# Patient Record
Sex: FEMALE | Race: WHITE | Employment: FULL TIME | URBAN - METROPOLITAN AREA
[De-identification: names, ages, dates, MRNs, and addresses within clinical notes are randomized per-mention and may not be internally consistent; named-entity substitution may affect disease eponyms.]

---

## 2018-12-23 ENCOUNTER — HOSPITAL ENCOUNTER (EMERGENCY)
Age: 63
Discharge: HOME | End: 2018-12-23
Attending: EMERGENCY MEDICINE
Payer: COMMERCIAL

## 2018-12-23 ENCOUNTER — APPOINTMENT (OUTPATIENT)
Dept: GENERAL RADIOLOGY | Age: 63
End: 2018-12-23
Payer: COMMERCIAL

## 2018-12-23 VITALS
TEMPERATURE: 102.1 F | RESPIRATION RATE: 18 BRPM | WEIGHT: 168 LBS | OXYGEN SATURATION: 91 % | HEIGHT: 66 IN | BODY MASS INDEX: 27 KG/M2 | HEART RATE: 87 BPM | DIASTOLIC BLOOD PRESSURE: 54 MMHG | SYSTOLIC BLOOD PRESSURE: 106 MMHG

## 2018-12-23 DIAGNOSIS — J18.9 PNEUMONIA DUE TO ORGANISM: ICD-10-CM

## 2018-12-23 DIAGNOSIS — J10.1 INFLUENZA A: Primary | ICD-10-CM

## 2018-12-23 LAB
ANION GAP SERPL CALCULATED.3IONS-SCNC: 14 MMOL/L (ref 7–16)
BASOPHILS ABSOLUTE: 0.05 E9/L (ref 0–0.2)
BASOPHILS RELATIVE PERCENT: 0.6 % (ref 0–2)
BUN BLDV-MCNC: 9 MG/DL (ref 8–23)
CALCIUM SERPL-MCNC: 8.5 MG/DL (ref 8.6–10.2)
CHLORIDE BLD-SCNC: 95 MMOL/L (ref 98–107)
CO2: 24 MMOL/L (ref 22–29)
CREAT SERPL-MCNC: 0.7 MG/DL (ref 0.5–1)
EOSINOPHILS ABSOLUTE: 0 E9/L (ref 0.05–0.5)
EOSINOPHILS RELATIVE PERCENT: 0 % (ref 0–6)
GFR AFRICAN AMERICAN: >60
GFR NON-AFRICAN AMERICAN: >60 ML/MIN/1.73
GLUCOSE BLD-MCNC: 110 MG/DL (ref 74–99)
HCT VFR BLD CALC: 39 % (ref 34–48)
HEMOGLOBIN: 13.1 G/DL (ref 11.5–15.5)
IMMATURE GRANULOCYTES #: 0.03 E9/L
IMMATURE GRANULOCYTES %: 0.4 % (ref 0–5)
INFLUENZA A BY PCR: DETECTED
INFLUENZA B BY PCR: NOT DETECTED
LYMPHOCYTES ABSOLUTE: 1.06 E9/L (ref 1.5–4)
LYMPHOCYTES RELATIVE PERCENT: 12.8 % (ref 20–42)
MCH RBC QN AUTO: 33.2 PG (ref 26–35)
MCHC RBC AUTO-ENTMCNC: 33.6 % (ref 32–34.5)
MCV RBC AUTO: 98.7 FL (ref 80–99.9)
MONOCYTES ABSOLUTE: 1.21 E9/L (ref 0.1–0.95)
MONOCYTES RELATIVE PERCENT: 14.7 % (ref 2–12)
NEUTROPHILS ABSOLUTE: 5.9 E9/L (ref 1.8–7.3)
NEUTROPHILS RELATIVE PERCENT: 71.5 % (ref 43–80)
PDW BLD-RTO: 13.1 FL (ref 11.5–15)
PLATELET # BLD: 228 E9/L (ref 130–450)
PMV BLD AUTO: 9.6 FL (ref 7–12)
POTASSIUM SERPL-SCNC: 4 MMOL/L (ref 3.5–5)
RBC # BLD: 3.95 E12/L (ref 3.5–5.5)
SODIUM BLD-SCNC: 133 MMOL/L (ref 132–146)
WBC # BLD: 8.3 E9/L (ref 4.5–11.5)

## 2018-12-23 PROCEDURE — 80048 BASIC METABOLIC PNL TOTAL CA: CPT

## 2018-12-23 PROCEDURE — 99284 EMERGENCY DEPT VISIT MOD MDM: CPT

## 2018-12-23 PROCEDURE — 6370000000 HC RX 637 (ALT 250 FOR IP): Performed by: EMERGENCY MEDICINE

## 2018-12-23 PROCEDURE — 71046 X-RAY EXAM CHEST 2 VIEWS: CPT

## 2018-12-23 PROCEDURE — 36415 COLL VENOUS BLD VENIPUNCTURE: CPT

## 2018-12-23 PROCEDURE — 6370000000 HC RX 637 (ALT 250 FOR IP): Performed by: STUDENT IN AN ORGANIZED HEALTH CARE EDUCATION/TRAINING PROGRAM

## 2018-12-23 PROCEDURE — 87502 INFLUENZA DNA AMP PROBE: CPT

## 2018-12-23 PROCEDURE — 94640 AIRWAY INHALATION TREATMENT: CPT

## 2018-12-23 PROCEDURE — 85025 COMPLETE CBC W/AUTO DIFF WBC: CPT

## 2018-12-23 RX ORDER — PREDNISONE 20 MG/1
60 TABLET ORAL ONCE
Status: COMPLETED | OUTPATIENT
Start: 2018-12-23 | End: 2018-12-23

## 2018-12-23 RX ORDER — OSELTAMIVIR PHOSPHATE 75 MG/1
75 CAPSULE ORAL 2 TIMES DAILY
Qty: 20 CAPSULE | Refills: 0 | Status: SHIPPED | OUTPATIENT
Start: 2018-12-23 | End: 2019-01-02

## 2018-12-23 RX ORDER — IPRATROPIUM BROMIDE AND ALBUTEROL SULFATE 2.5; .5 MG/3ML; MG/3ML
1 SOLUTION RESPIRATORY (INHALATION) ONCE
Status: COMPLETED | OUTPATIENT
Start: 2018-12-23 | End: 2018-12-23

## 2018-12-23 RX ORDER — ALBUTEROL SULFATE 90 UG/1
2 AEROSOL, METERED RESPIRATORY (INHALATION) EVERY 6 HOURS PRN
Qty: 1 INHALER | Refills: 3 | Status: SHIPPED | OUTPATIENT
Start: 2018-12-23

## 2018-12-23 RX ORDER — OSELTAMIVIR PHOSPHATE 75 MG/1
75 CAPSULE ORAL 2 TIMES DAILY
Status: DISCONTINUED | OUTPATIENT
Start: 2018-12-23 | End: 2018-12-23

## 2018-12-23 RX ORDER — ACETAMINOPHEN 325 MG/1
650 TABLET ORAL ONCE
Status: COMPLETED | OUTPATIENT
Start: 2018-12-23 | End: 2018-12-23

## 2018-12-23 RX ORDER — PREDNISONE 20 MG/1
TABLET ORAL
Qty: 10 TABLET | Refills: 0 | Status: SHIPPED | OUTPATIENT
Start: 2018-12-23 | End: 2019-01-02

## 2018-12-23 RX ORDER — DOXYCYCLINE HYCLATE 100 MG
100 TABLET ORAL 2 TIMES DAILY
Qty: 20 TABLET | Refills: 0 | Status: SHIPPED | OUTPATIENT
Start: 2018-12-23 | End: 2019-01-02

## 2018-12-23 RX ADMIN — PREDNISONE 60 MG: 20 TABLET ORAL at 16:25

## 2018-12-23 RX ADMIN — IPRATROPIUM BROMIDE AND ALBUTEROL SULFATE 1 AMPULE: .5; 3 SOLUTION RESPIRATORY (INHALATION) at 16:34

## 2018-12-23 RX ADMIN — ACETAMINOPHEN 650 MG: 325 TABLET ORAL at 17:36

## 2018-12-23 ASSESSMENT — ENCOUNTER SYMPTOMS
VOMITING: 0
TROUBLE SWALLOWING: 0
WHEEZING: 1
COUGH: 1
COLOR CHANGE: 0
SORE THROAT: 1
RHINORRHEA: 1
SHORTNESS OF BREATH: 0
BACK PAIN: 0
NAUSEA: 0
VOICE CHANGE: 0
ABDOMINAL PAIN: 0
STRIDOR: 0
DIARRHEA: 0
BLOOD IN STOOL: 0

## 2018-12-23 ASSESSMENT — PAIN SCALES - GENERAL: PAINLEVEL_OUTOF10: 0

## 2018-12-23 NOTE — ED NOTES
Walked patient on pulse Ox patient stated at 91% RA, denies SOB, HR in 80s.       Nava Quintero, RN  12/23/18 2294

## 2018-12-23 NOTE — ED PROVIDER NOTES
influenza. Workup in the ED revealed influenza A positivity and a right lower lobe infiltrate on chest x-ray. Patient was given DuoNeb treatment and Tylenol for their symptoms with moderate improvement. Patient was advised to stop smoking. Patient continues to be non-toxic on re-evaluation. Findings were discussed with the patient and reasons to immediately return to the ED were articulated to them. They will follow-up with their PMD.              --------------------------------------------- PAST HISTORY ---------------------------------------------  Past Medical History:  has a past medical history of Hypertension. Past Surgical History:  has no past surgical history on file. Social History:  reports that she has been smoking. She has been smoking about 0.50 packs per day. She has never used smokeless tobacco. She reports that she drinks alcohol. She reports that she does not use drugs. Family History: family history is not on file. The patients home medications have been reviewed.     Allergies: Pcn [penicillins]    -------------------------------------------------- RESULTS -------------------------------------------------  Labs:  Results for orders placed or performed during the hospital encounter of 12/23/18   Rapid influenza A/B antigens   Result Value Ref Range    Influenza A by PCR DETECTED (A) Not Detected    Influenza B by PCR Not Detected Not Detected   CBC Auto Differential   Result Value Ref Range    WBC 8.3 4.5 - 11.5 E9/L    RBC 3.95 3.50 - 5.50 E12/L    Hemoglobin 13.1 11.5 - 15.5 g/dL    Hematocrit 39.0 34.0 - 48.0 %    MCV 98.7 80.0 - 99.9 fL    MCH 33.2 26.0 - 35.0 pg    MCHC 33.6 32.0 - 34.5 %    RDW 13.1 11.5 - 15.0 fL    Platelets 413 009 - 489 E9/L    MPV 9.6 7.0 - 12.0 fL    Neutrophils % 71.5 43.0 - 80.0 %    Immature Granulocytes % 0.4 0.0 - 5.0 %    Lymphocytes % 12.8 (L) 20.0 - 42.0 %    Monocytes % 14.7 (H) 2.0 - 12.0 %    Eosinophils % 0.0 0.0 - 6.0 %    Basophils % 0.6

## 2021-03-24 ENCOUNTER — IMMUNIZATION (OUTPATIENT)
Dept: PRIMARY CARE CLINIC | Age: 66
End: 2021-03-24
Payer: COMMERCIAL

## 2021-03-24 PROCEDURE — 91300 COVID-19, PFIZER VACCINE 30MCG/0.3ML DOSE: CPT | Performed by: INTERNAL MEDICINE

## 2021-03-24 PROCEDURE — 0001A COVID-19, PFIZER VACCINE 30MCG/0.3ML DOSE: CPT | Performed by: INTERNAL MEDICINE

## 2021-04-15 ENCOUNTER — IMMUNIZATION (OUTPATIENT)
Dept: PRIMARY CARE CLINIC | Age: 66
End: 2021-04-15
Payer: COMMERCIAL

## 2021-04-15 PROCEDURE — 0002A COVID-19, PFIZER VACCINE 30MCG/0.3ML DOSE: CPT | Performed by: INTERNAL MEDICINE

## 2021-04-15 PROCEDURE — 91300 COVID-19, PFIZER VACCINE 30MCG/0.3ML DOSE: CPT | Performed by: INTERNAL MEDICINE

## 2022-10-21 ENCOUNTER — HOSPITAL ENCOUNTER (OUTPATIENT)
Dept: ULTRASOUND IMAGING | Age: 67
Discharge: HOME | End: 2022-10-23
Payer: MEDICARE

## 2022-10-21 DIAGNOSIS — E04.1 THYROID NODULE: ICD-10-CM

## 2022-10-21 PROCEDURE — 10005 FNA BX W/US GDN 1ST LES: CPT

## 2022-10-21 PROCEDURE — 88173 CYTOPATH EVAL FNA REPORT: CPT

## 2022-10-21 PROCEDURE — 88305 TISSUE EXAM BY PATHOLOGIST: CPT

## 2022-10-21 PROCEDURE — 10005 FNA BX W/US GDN 1ST LES: CPT | Performed by: RADIOLOGY

## 2022-10-21 NOTE — BRIEF OP NOTE
Brief Postoperative Note    Armando Dsouza  YOB: 1955  [id number]    Pre-operative Diagnosis: left thyroid mass plan bx    Post-operative Diagnosis: Same    Procedure: biopsy    Estimated Blood Loss: < 10 cc    Surgeon: Anitha RAZO     Complications: none    Specimens obtained: Yes, biopsy of mass    Findings: biopsy of a mass      Casper Mak II, MD   10/21/2022 11:53 AM

## 2022-10-21 NOTE — H&P
Interventional Radiology  Attending Pre-operative History and Physical    DIAGNOSIS:  There is no problem list on file for this patient. CHIEF COMPLAINT: <principal problem not specified>          Current Outpatient Medications:     albuterol sulfate  (90 Base) MCG/ACT inhaler, Inhale 2 puffs into the lungs every 6 hours as needed for Wheezing, Disp: 1 Inhaler, Rfl: 3    DULoxetine (CYMBALTA) 60 MG capsule, Take 60 mg by mouth daily. , Disp: , Rfl:     olmesartan (BENICAR) 20 MG tablet, Take 20 mg by mouth daily. , Disp: , Rfl:     Allergies   Allergen Reactions    Pcn [Penicillins] Other (See Comments)     Unknown reaction       Past Medical History:   Diagnosis Date    Hypertension        No past surgical history on file. No family history on file.     Social History     Socioeconomic History    Marital status: Single     Spouse name: Not on file    Number of children: Not on file    Years of education: Not on file    Highest education level: Not on file   Occupational History    Not on file   Tobacco Use    Smoking status: Every Day     Packs/day: 0.50     Types: Cigarettes    Smokeless tobacco: Never   Substance and Sexual Activity    Alcohol use: Yes     Comment: social    Drug use: No    Sexual activity: Not on file   Other Topics Concern    Not on file   Social History Narrative    Not on file     Social Determinants of Health     Financial Resource Strain: Not on file   Food Insecurity: Not on file   Transportation Needs: Not on file   Physical Activity: Not on file   Stress: Not on file   Social Connections: Not on file   Intimate Partner Violence: Not on file   Housing Stability: Not on file       ROS: Non-contributory other than as noted above    PHYSICAL EXAM:      Heart and Lungs:  demonstrate no contraindications to proceed    DATA:  CBC:   Lab Results   Component Value Date/Time    WBC 8.3 12/23/2018 04:13 PM    RBC 3.95 12/23/2018 04:13 PM    HGB 13.1 12/23/2018 04:13 PM    HCT 39.0 12/23/2018 04:13 PM    MCV 98.7 12/23/2018 04:13 PM    MCH 33.2 12/23/2018 04:13 PM    MCHC 33.6 12/23/2018 04:13 PM    RDW 13.1 12/23/2018 04:13 PM     12/23/2018 04:13 PM    MPV 9.6 12/23/2018 04:13 PM     CBC with Differential:    Lab Results   Component Value Date/Time    WBC 8.3 12/23/2018 04:13 PM    RBC 3.95 12/23/2018 04:13 PM    HGB 13.1 12/23/2018 04:13 PM    HCT 39.0 12/23/2018 04:13 PM     12/23/2018 04:13 PM    MCV 98.7 12/23/2018 04:13 PM    MCH 33.2 12/23/2018 04:13 PM    MCHC 33.6 12/23/2018 04:13 PM    RDW 13.1 12/23/2018 04:13 PM    LYMPHOPCT 12.8 12/23/2018 04:13 PM    MONOPCT 14.7 12/23/2018 04:13 PM    BASOPCT 0.6 12/23/2018 04:13 PM    MONOSABS 1.21 12/23/2018 04:13 PM    LYMPHSABS 1.06 12/23/2018 04:13 PM    EOSABS 0.00 12/23/2018 04:13 PM    BASOSABS 0.05 12/23/2018 04:13 PM     Platelets:    Lab Results   Component Value Date/Time     12/23/2018 04:13 PM     BUN/Creatinine:    Lab Results   Component Value Date/Time    BUN 9 12/23/2018 04:13 PM    CREATININE 0.7 12/23/2018 04:13 PM       ASSESSMENT AND PLAN:  1. Left thyroid mass  2. Procedure options, risks and benefits reviewed with patient. Patient expresses understanding.     Electronically signed by Sukhjinder Jimenez II, MD on 10/21/2022 at 11:55 AM

## 2023-01-25 ENCOUNTER — HOSPITAL ENCOUNTER (EMERGENCY)
Age: 68
Discharge: HOME | End: 2023-01-25
Attending: EMERGENCY MEDICINE
Payer: COMMERCIAL

## 2023-01-25 ENCOUNTER — APPOINTMENT (OUTPATIENT)
Dept: CT IMAGING | Age: 68
End: 2023-01-25
Payer: COMMERCIAL

## 2023-01-25 VITALS
DIASTOLIC BLOOD PRESSURE: 73 MMHG | TEMPERATURE: 97.8 F | OXYGEN SATURATION: 98 % | HEIGHT: 66 IN | SYSTOLIC BLOOD PRESSURE: 163 MMHG | RESPIRATION RATE: 18 BRPM | BODY MASS INDEX: 28.12 KG/M2 | WEIGHT: 175 LBS | HEART RATE: 84 BPM

## 2023-01-25 DIAGNOSIS — M51.36 DEGENERATIVE LUMBAR DISC: ICD-10-CM

## 2023-01-25 DIAGNOSIS — M54.31 SCIATICA OF RIGHT SIDE: ICD-10-CM

## 2023-01-25 DIAGNOSIS — M47.816 LUMBAR ARTHROPATHY: ICD-10-CM

## 2023-01-25 DIAGNOSIS — M48.07 NEURAL FORAMINAL STENOSIS OF LUMBOSACRAL SPINE: ICD-10-CM

## 2023-01-25 DIAGNOSIS — M54.50 LUMBAR BACK PAIN: Primary | ICD-10-CM

## 2023-01-25 DIAGNOSIS — M89.9 LYTIC BONE LESIONS ON XRAY: ICD-10-CM

## 2023-01-25 DIAGNOSIS — M48.061 SPINAL STENOSIS OF LUMBAR REGION, UNSPECIFIED WHETHER NEUROGENIC CLAUDICATION PRESENT: ICD-10-CM

## 2023-01-25 LAB
ALBUMIN SERPL-MCNC: 4.1 G/DL (ref 3.5–5.2)
ALP BLD-CCNC: 81 U/L (ref 35–104)
ALT SERPL-CCNC: 12 U/L (ref 0–32)
ANION GAP SERPL CALCULATED.3IONS-SCNC: 9 MMOL/L (ref 7–16)
AST SERPL-CCNC: 13 U/L (ref 0–31)
BACTERIA: ABNORMAL /HPF
BASOPHILS ABSOLUTE: 0.08 E9/L (ref 0–0.2)
BASOPHILS RELATIVE PERCENT: 1 % (ref 0–2)
BILIRUB SERPL-MCNC: 0.4 MG/DL (ref 0–1.2)
BILIRUBIN URINE: NEGATIVE
BLOOD, URINE: NEGATIVE
BUN BLDV-MCNC: 15 MG/DL (ref 6–23)
CALCIUM SERPL-MCNC: 9.6 MG/DL (ref 8.6–10.2)
CHLORIDE BLD-SCNC: 103 MMOL/L (ref 98–107)
CLARITY: CLEAR
CO2: 27 MMOL/L (ref 22–29)
COLOR: YELLOW
CREAT SERPL-MCNC: 0.7 MG/DL (ref 0.5–1)
EOSINOPHILS ABSOLUTE: 0.11 E9/L (ref 0.05–0.5)
EOSINOPHILS RELATIVE PERCENT: 1.3 % (ref 0–6)
EPITHELIAL CELLS, UA: ABNORMAL /HPF
GFR SERPL CREATININE-BSD FRML MDRD: >60 ML/MIN/1.73
GLUCOSE BLD-MCNC: 106 MG/DL (ref 74–99)
GLUCOSE URINE: NEGATIVE MG/DL
HCT VFR BLD CALC: 42 % (ref 34–48)
HEMOGLOBIN: 13.9 G/DL (ref 11.5–15.5)
IMMATURE GRANULOCYTES #: 0.02 E9/L
IMMATURE GRANULOCYTES %: 0.2 % (ref 0–5)
KETONES, URINE: NEGATIVE MG/DL
LEUKOCYTE ESTERASE, URINE: NEGATIVE
LYMPHOCYTES ABSOLUTE: 3.35 E9/L (ref 1.5–4)
LYMPHOCYTES RELATIVE PERCENT: 40.4 % (ref 20–42)
MCH RBC QN AUTO: 32.9 PG (ref 26–35)
MCHC RBC AUTO-ENTMCNC: 33.1 % (ref 32–34.5)
MCV RBC AUTO: 99.3 FL (ref 80–99.9)
MONOCYTES ABSOLUTE: 0.75 E9/L (ref 0.1–0.95)
MONOCYTES RELATIVE PERCENT: 9 % (ref 2–12)
NEUTROPHILS ABSOLUTE: 3.98 E9/L (ref 1.8–7.3)
NEUTROPHILS RELATIVE PERCENT: 48.1 % (ref 43–80)
NITRITE, URINE: NEGATIVE
PDW BLD-RTO: 12.3 FL (ref 11.5–15)
PH UA: 6 (ref 5–9)
PLATELET # BLD: 294 E9/L (ref 130–450)
PMV BLD AUTO: 9.3 FL (ref 7–12)
POTASSIUM SERPL-SCNC: 4.1 MMOL/L (ref 3.5–5)
PROTEIN UA: NEGATIVE MG/DL
RBC # BLD: 4.23 E12/L (ref 3.5–5.5)
RBC UA: ABNORMAL /HPF (ref 0–2)
SODIUM BLD-SCNC: 139 MMOL/L (ref 132–146)
SPECIFIC GRAVITY UA: 1.02 (ref 1–1.03)
TOTAL PROTEIN: 7.1 G/DL (ref 6.4–8.3)
UROBILINOGEN, URINE: 0.2 E.U./DL
WBC # BLD: 8.3 E9/L (ref 4.5–11.5)
WBC UA: ABNORMAL /HPF (ref 0–5)

## 2023-01-25 PROCEDURE — 80053 COMPREHEN METABOLIC PANEL: CPT

## 2023-01-25 PROCEDURE — 81001 URINALYSIS AUTO W/SCOPE: CPT

## 2023-01-25 PROCEDURE — 6360000002 HC RX W HCPCS: Performed by: EMERGENCY MEDICINE

## 2023-01-25 PROCEDURE — 85025 COMPLETE CBC W/AUTO DIFF WBC: CPT

## 2023-01-25 PROCEDURE — 99284 EMERGENCY DEPT VISIT MOD MDM: CPT

## 2023-01-25 PROCEDURE — 72131 CT LUMBAR SPINE W/O DYE: CPT

## 2023-01-25 PROCEDURE — 96372 THER/PROPH/DIAG INJ SC/IM: CPT

## 2023-01-25 PROCEDURE — 6360000002 HC RX W HCPCS: Performed by: NURSE PRACTITIONER

## 2023-01-25 RX ORDER — KETOROLAC TROMETHAMINE 30 MG/ML
30 INJECTION, SOLUTION INTRAMUSCULAR; INTRAVENOUS ONCE
Status: COMPLETED | OUTPATIENT
Start: 2023-01-25 | End: 2023-01-25

## 2023-01-25 RX ORDER — KETOROLAC TROMETHAMINE 30 MG/ML
INJECTION, SOLUTION INTRAMUSCULAR; INTRAVENOUS
Status: DISPENSED
Start: 2023-01-25 | End: 2023-01-26

## 2023-01-25 RX ORDER — METHYLPREDNISOLONE 4 MG/1
TABLET ORAL
Qty: 1 KIT | Refills: 0 | Status: SHIPPED | OUTPATIENT
Start: 2023-01-25 | End: 2023-01-31

## 2023-01-25 RX ORDER — TRIAMCINOLONE ACETONIDE 40 MG/ML
INJECTION, SUSPENSION INTRA-ARTICULAR; INTRAMUSCULAR
Status: DISPENSED
Start: 2023-01-25 | End: 2023-01-26

## 2023-01-25 RX ORDER — HYDROCODONE BITARTRATE AND ACETAMINOPHEN 5; 325 MG/1; MG/1
1 TABLET ORAL EVERY 6 HOURS PRN
Qty: 12 TABLET | Refills: 0 | Status: SHIPPED | OUTPATIENT
Start: 2023-01-25 | End: 2023-01-28

## 2023-01-25 RX ORDER — TRIAMCINOLONE ACETONIDE 40 MG/ML
60 INJECTION, SUSPENSION INTRA-ARTICULAR; INTRAMUSCULAR ONCE
Status: COMPLETED | OUTPATIENT
Start: 2023-01-25 | End: 2023-01-25

## 2023-01-25 RX ADMIN — TRIAMCINOLONE ACETONIDE 60 MG: 40 INJECTION, SUSPENSION INTRA-ARTICULAR; INTRAMUSCULAR at 18:48

## 2023-01-25 RX ADMIN — KETOROLAC TROMETHAMINE 30 MG: 30 INJECTION, SOLUTION INTRAMUSCULAR; INTRAVENOUS at 17:24

## 2023-01-25 ASSESSMENT — PAIN DESCRIPTION - ORIENTATION: ORIENTATION: LOWER

## 2023-01-25 ASSESSMENT — PAIN - FUNCTIONAL ASSESSMENT: PAIN_FUNCTIONAL_ASSESSMENT: 0-10

## 2023-01-25 ASSESSMENT — PAIN SCALES - GENERAL
PAINLEVEL_OUTOF10: 7
PAINLEVEL_OUTOF10: 7

## 2023-01-25 ASSESSMENT — PAIN DESCRIPTION - LOCATION: LOCATION: BACK

## 2023-01-25 ASSESSMENT — PAIN DESCRIPTION - DESCRIPTORS: DESCRIPTORS: THROBBING

## 2023-01-25 NOTE — ED PROVIDER NOTES
Shared SELAM-ED Attending Visit. CC: NO       Department of Emergency Medicine   ED  Encounter Note  Admit Date/RoomTime: 2023  4:53 PM  ED Room:   NAME: Lenin Smith  : 1955  MRN: [id number]    CHIEF COMPLAINT     Back Pain (Lower back pain x 1week. Denies injury)    Akua Wilkinson is a 74 y.o. female who presents to the ED by private vehicle for approximately a month ago patient noticed she was having right lower back pain. Patient states that the pain has been intermittent in the past week she has noted it has been more intense with possibly some numbness like sensation of her right leg. Patient states that she has full sensation of her right leg and she has no pain in her right leg but she has a sensation that is similar to numbness but she states its not numb. Patient denies any trauma or injury within the past months to her back or leg or any other part of her body. 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 Patient states that she has taken Tylenol with minimal relief for the pain. Patient states that today was the first day she tried a Lidoderm patch and she really did not give it enough time to see if it would help in considering the pain was more significant today she coming to the emergency department. Patient denies any bowel or bladder problems. Patient denies any fever, dizziness, chills, dysuria constipation, diarrhea, palpitation or shortness of breath. Patient states that the pain is mild in severity and it is exacerbated while she was at school working and standing on her feet all day. REVIEW OF SYSTEMS     Pertinent positives and negatives are stated within HPI, all other systems reviewed and are negative. Past Medical History:  has a past medical history of Hypertension. Surgical History:  has no past surgical history on file. Social History:  reports that she has been smoking.  She has been smoking an average of .5 packs per day. She has never used smokeless tobacco. She reports current alcohol use. She reports that she does not use drugs. Family History: family history is not on file. Allergies: Pcn [penicillins]  CURRENT MEDICATIONS       Previous Medications    ALBUTEROL SULFATE  (90 BASE) MCG/ACT INHALER    Inhale 2 puffs into the lungs every 6 hours as needed for Wheezing    DULOXETINE (CYMBALTA) 60 MG CAPSULE    Take 60 mg by mouth daily. OLMESARTAN (BENICAR) 20 MG TABLET    Take 20 mg by mouth daily. SCREENINGS     Bimal Coma Scale  Eye Opening: Spontaneous  Best Verbal Response: Oriented  Best Motor Response: Obeys commands  [place] Coma Scale Score: 15         CIWA Assessment  BP: (!) 163/73  Heart Rate: 84       PHYSICAL EXAM   Oxygen Saturation Interpretation: Normal on room air analysis. ED Triage Vitals [01/25/23 1651]   BP Temp Temp src Heart Rate Resp SpO2 Height Weight   (!) 163/73 97.8 °F (36.6 °C) -- 84 18 98 % 5' 6\" (1.676 m) 175 lb (79.4 kg)         Physical Exam  Constitutional/General: Alert and oriented x3, well appearing, non toxic  HEENT:  NC/NT. PERRLA,  Airway patent. Neck: Supple, full ROM, non tender to palpation in the midline, no stridor, no crepitus, no meningeal signs  Respiratory: Lungs clear to auscultation bilaterally, no wheezes, rales, or rhonchi. Not in respiratory distress  CV:  Regular rate. Regular rhythm. No murmurs, gallops, or rubs. 2+ distal pulses  Chest: No chest wall tenderness  GI:  Abdomen Soft, Non tender, Non distended. +BS. No rebound, guarding, or rigidity. No pulsatile masses. Musculoskeletal: Moves all extremities x 4. Warm and well perfused, no clubbing, cyanosis, or edema. Capillary refill <3 seconds patient has a steady gait without any ataxia. He has 2+ patellar reflexes bilaterally. There is normal strength in the thighs against resistance.   There is normal flexion extension at the levels of the knees ankles and toes. Sensation is intact and symmetrical to bilateral lower extremities on both the medial and lateral aspects. Integument: skin warm and dry. No rashes.    Lymphatic: no lymphadenopathy noted  Neurologic: GCS 15, no focal deficits, symmetric strength 5/5 in the upper and lower extremities bilaterally  Psychiatric: Normal Affect    DIAGNOSTIC RESULTS   (All laboratory and radiology results have been personally reviewed by myself)  Labs:  Results for orders placed or performed during the hospital encounter of 01/25/23   CBC with Auto Differential   Result Value Ref Range    WBC 8.3 4.5 - 11.5 E9/L    RBC 4.23 3.50 - 5.50 E12/L    Hemoglobin 13.9 11.5 - 15.5 g/dL    Hematocrit 42.0 34.0 - 48.0 %    MCV 99.3 80.0 - 99.9 fL    MCH 32.9 26.0 - 35.0 pg    MCHC 33.1 32.0 - 34.5 %    RDW 12.3 11.5 - 15.0 fL    Platelets 307 052 - 996 E9/L    MPV 9.3 7.0 - 12.0 fL    Neutrophils % 48.1 43.0 - 80.0 %    Immature Granulocytes % 0.2 0.0 - 5.0 %    Lymphocytes % 40.4 20.0 - 42.0 %    Monocytes % 9.0 2.0 - 12.0 %    Eosinophils % 1.3 0.0 - 6.0 %    Basophils % 1.0 0.0 - 2.0 %    Neutrophils Absolute 3.98 1.80 - 7.30 E9/L    Immature Granulocytes # 0.02 E9/L    Lymphocytes Absolute 3.35 1.50 - 4.00 E9/L    Monocytes Absolute 0.75 0.10 - 0.95 E9/L    Eosinophils Absolute 0.11 0.05 - 0.50 E9/L    Basophils Absolute 0.08 0.00 - 0.20 E9/L   Comprehensive Metabolic Panel   Result Value Ref Range    Sodium 139 132 - 146 mmol/L    Potassium 4.1 3.5 - 5.0 mmol/L    Chloride 103 98 - 107 mmol/L    CO2 27 22 - 29 mmol/L    Anion Gap 9 7 - 16 mmol/L    Glucose 106 (H) 74 - 99 mg/dL    BUN 15 6 - 23 mg/dL    Creatinine 0.7 0.5 - 1.0 mg/dL    Est, Glom Filt Rate >60 >=60 mL/min/1.73    Calcium 9.6 8.6 - 10.2 mg/dL    Total Protein 7.1 6.4 - 8.3 g/dL    Albumin 4.1 3.5 - 5.2 g/dL    Total Bilirubin 0.4 0.0 - 1.2 mg/dL    Alkaline Phosphatase 81 35 - 104 U/L    ALT 12 0 - 32 U/L    AST 13 0 - 31 U/L   Urinalysis with Microscopic   Result Value Ref Range    Color, UA Yellow Straw/Yellow    Clarity, UA Clear Clear    Glucose, Ur Negative Negative mg/dL    Bilirubin Urine Negative Negative    Ketones, Urine Negative Negative mg/dL    Specific Gravity, UA 1.020 1.005 - 1.030    Blood, Urine Negative Negative    pH, UA 6.0 5.0 - 9.0    Protein, UA Negative Negative mg/dL    Urobilinogen, Urine 0.2 <2.0 E.U./dL    Nitrite, Urine Negative Negative    Leukocyte Esterase, Urine Negative Negative     Imaging: All Radiology results interpreted by Radiologist unless otherwise noted. CT LUMBAR SPINE WO CONTRAST   Final Result   1. No acute fracture or subluxation. 2. There is a lytic lesion involving the right sacral ala that may represent   metastasis. 3. Degenerative disc disease and facet arthropathy in the lumbar spine with   possible central canal stenosis at L3-4. There is neural foraminal narrowing   at L2-3 through L4-5. ED COURSE   Vitals:    Vitals:    01/25/23 1651   BP: (!) 163/73   Pulse: 84   Resp: 18   Temp: 97.8 °F (36.6 °C)   SpO2: 98%   Weight: 175 lb (79.4 kg)   Height: 5' 6\" (1.676 m)       Patient was given the following medications:  Medications   ketorolac (TORADOL) injection 30 mg (30 mg IntraMUSCular Given 1/25/23 1724)   triamcinolone acetonide (KENALOG-40) injection 60 mg (60 mg IntraMUSCular Given 1/25/23 1848)          PROCEDURES   none    REASSESSMENT   1/25/23       Time: 1900  Patients condition is improving. CONSULTS:  None  DIFFERENTIAL DX_MDM   MDM: Patient presents with Back Pain (Lower back pain x 1week. Denies injury)  Shared decision making with Dr. Nayana Perkins  Patient presents to the ED for approximately a month ago patient noticed she was having right lower back pain. Patient states that the pain has been intermittent in the past week she has noted it has been more intense with possibly some numbness like sensation of her right leg.   Patient states that she has full sensation of her right leg and she has no pain in her right leg but she has a sensation that is similar to numbness but she states its not numb. Patient denies any trauma or injury within the past months to her back or leg or any other part of her body. 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 Patient states that she has taken Tylenol with minimal relief for the pain. Patient states that today was the first day she tried a Lidoderm patch and she really did not give it enough time to see if it would help in considering the pain was more significant today she coming to the emergency department. Patient denies any bowel or bladder problems. Patient denies any fever, dizziness, chills, dysuria constipation, diarrhea, palpitation or shortness of breath. Patient states that the pain is mild in severity and it is exacerbated while she was at school working and standing on her feet all day. . Differential diagnoses included but not limited to nerve impingement, degenerative disc, acute disc injury, lumbar spinal stenosis. Workup in the ED revealed neurological examination revealed no evidence of spinal cord compression, cauda equina syndrome, no evidence of abscess, hematoma or any other abnormalities/dysfunction. Patient of CT scan shows no acute fracture or subluxation, there is a lytic lesion involving the right sacral singh that might represent metastasis, degenerative disc disease and facet arthropathy in the lumbar spine with possible central canal stenosis at L3-4. There is neural foraminal narrowing at L2-3 through L4-5. Urinalysis unremarkable, CBC unremarkable, CMP unremarkable glucose 106. Ry Ho Patient was given Toradol 30 mg IM Kenalog 60 mg for their symptoms with moderate improvement. Patient continues to be non-toxic on re-evaluation. Findings were discussed with the patient and reasons to immediately return to the ED were articulated to them.  They will follow-up with their PMD    Plan of Care/Counseling:  JAMIL COLLINS CNP and EM Attending Physician reviewed today's visit with the patient in addition to providing specific details for the plan of care and counseling regarding the diagnosis and prognosis.  Questions are answered at this time and are agreeable with the plan.    ASSESSMENT     1. Lumbar back pain    2. Sciatica of right side    3. Lytic bone lesions on xray    4. Degenerative lumbar disc    5. Lumbar arthropathy    6. Spinal stenosis of lumbar region, unspecified whether neurogenic claudication present    7. Neural foraminal stenosis of lumbosacral spine        DISPOSITION   Discharged home.  Patient condition is good    NEW MEDICATIONS     New Prescriptions    HYDROCODONE-ACETAMINOPHEN (NORCO) 5-325 MG PER TABLET    Take 1 tablet by mouth every 6 hours as needed for Pain for up to 3 days. Intended supply: 3 days. Take lowest dose possible to manage pain Max Daily Amount: 4 tablets    METHYLPREDNISOLONE (MEDROL, KASSY,) 4 MG TABLET    Take by mouth.     Electronically signed by JAMIL COLLINS CNP   DD: 1/25/23  **This report was transcribed using voice recognition software. Every effort was made to ensure accuracy; however, inadvertent computerized transcription errors may be present.  END OF ED PROVIDER NOTE        JAMIL Collins CNP  01/25/23 1915       JAIML Collins CNP  01/25/23 2008

## 2023-02-10 LAB — ADDENDUM ELECTROPHORESIS URINE RANDOM: NORMAL

## 2023-03-01 NOTE — PROGRESS NOTES
[address]   PRE-ADMISSION TESTING GENERAL INSTRUCTIONS  [facility] Phone Number: [phone]      GENERAL INSTRUCTIONS:    [x] Antibacterial Soap Shower Night before and/or AM of surgery. [] CHG Wipes instruction sheet and wipes given. [] Hibiclens shower the night before and the morning of surgery.   -Do not use Hibiclens on your face or head. [x] Do not wear makeup, lotions, powders, deodorant. [x] Nothing by mouth after midnight; including gum, candy, mints, or water. [x] You may brush your teeth, gargle, but do NOT swallow water. [x] No tobacco products, illegal drugs, or alcohol within 24 hours of your surgery. [x] Jewelry or valuables should not be brought to the hospital. All body and/or tongue piercing's must be removed prior to arriving to hospital. No contact lens or hair pins. [x] Arrange transportation with a responsible adult  to and from the hospital. Arrange for someone to be with you for the remainder of the day and for 24 hours after your procedure due to having had anesthesia. -Who will be your  for transportation?____friend______________         -Who will be staying with you for 24 hrs after your procedure?___friend_______________  [x] Bring insurance card and photo ID.  [] Bring copy of living will or healthcare power of  papers to be placed in your electronic record. [] Urine Pregnancy test will be preformed the day of surgery. A specimen sample may be brought from home. [] Transfusion Bracelet: Please bring with you to hospital, day of surgery. PARKING INSTRUCTIONS:     [x] ARRIVAL DATE & TIME: 3/7 at 0930  [x] Enter into the [facility]. Two people may accompany you. Masks are not required but are recommended. [x] Parking Lot \"I\" is where you will park. It is located on the corner of [address] and [address]. The entrance is on [address].    Upon entering the parking lot, a voucher ticket will print    EDUCATION INSTRUCTIONS:        [] Knee or Hip replacement booklet & exercise pamphlets given. [] [address] placed in chart. [] Pre-admission Testing educational folder given  [] Incentive Spirometry,coughing & deep breathing exercises reviewed. [] Medication information sheet(s)   [] Fluoroscopy-Xray used in surgery reviewed with patient. Educational pamphlet placed in chart. [] Pain: Post-op pain is normal and to be expected. You will be asked to rate your pain from 0-10. [] Joint camp offered. [] Joint replacement booklets given.  [] Spine Navigator to see in PAT. [] Other:___________________________    MEDICATION INSTRUCTIONS:    [x] Bring a complete list of your medications, please write the last time you took the medicine, give this list to the nurse in Pre-Op. [x] Take only the following medications the morning of surgery with 1-2 ounces of water: lexapro  [x] Stop all herbal supplements and vitamins 5 days before surgery. Stop NSAIDS 7 days before surgery. [] DO NOT take any diabetic medicine the morning of surgery. Follow instructions for insulin the day before surgery. [] If you are diabetic and your blood sugar is low or you feel symptomatic, you may drink 1-2 ounces of apple juice or take a glucose tablet.            -The morning of your procedure, you may call the pre-op area if you have concerns about your blood sugar [phone]. [] Use your inhalers the morning of surgery. Bring your emergency inhaler with you day of surgery. [] Follow physician instructions regarding any blood thinners you may be taking. WHAT TO EXPECT:    [x] The day of surgery you will be greeted and checked in by the [employer].  In addition, you will be registered in the [facility] by a Patient Access Representative. Please bring your photo ID and insurance card.  A nurse will greet you in accordance to the time you are needed in the pre-op area to prepare you for surgery. Please do not be discouraged if you are not greeted in the order you arrive as there are many variables that are involved in patient preparation. Your patience is greatly appreciated as you wait for your nurse. Please bring in items such as: books, magazines, newspapers, electronics, or any other items  to occupy your time in the waiting area. [x]  Delays may occur with surgery and staff will make a sincere effort to keep you informed of delays. If any delays occur with your procedure, we apologize ahead of time for your inconvenience as we recognize the value of your time.

## 2023-03-07 ENCOUNTER — ANESTHESIA (OUTPATIENT)
Dept: ENDOSCOPY | Age: 68
End: 2023-03-07
Payer: COMMERCIAL

## 2023-03-07 ENCOUNTER — ANESTHESIA EVENT (OUTPATIENT)
Dept: ENDOSCOPY | Age: 68
End: 2023-03-07
Payer: COMMERCIAL

## 2023-03-07 ENCOUNTER — HOSPITAL ENCOUNTER (OUTPATIENT)
Age: 68
Setting detail: OUTPATIENT SURGERY
Discharge: HOME | End: 2023-03-07
Attending: INTERNAL MEDICINE | Admitting: INTERNAL MEDICINE
Payer: COMMERCIAL

## 2023-03-07 ENCOUNTER — APPOINTMENT (OUTPATIENT)
Dept: GENERAL RADIOLOGY | Age: 68
End: 2023-03-07
Attending: INTERNAL MEDICINE
Payer: COMMERCIAL

## 2023-03-07 VITALS
HEART RATE: 72 BPM | WEIGHT: 180 LBS | OXYGEN SATURATION: 95 % | HEIGHT: 66 IN | SYSTOLIC BLOOD PRESSURE: 122 MMHG | RESPIRATION RATE: 24 BRPM | DIASTOLIC BLOOD PRESSURE: 68 MMHG | BODY MASS INDEX: 28.93 KG/M2 | TEMPERATURE: 97 F

## 2023-03-07 DIAGNOSIS — C34.90 STAGE 4 MALIGNANT NEOPLASM OF LUNG, UNSPECIFIED LATERALITY (HCC): ICD-10-CM

## 2023-03-07 PROCEDURE — 7100000000 HC PACU RECOVERY - FIRST 15 MIN: Performed by: INTERNAL MEDICINE

## 2023-03-07 PROCEDURE — 6370000000 HC RX 637 (ALT 250 FOR IP): Performed by: ANESTHESIOLOGY

## 2023-03-07 PROCEDURE — 3700000000 HC ANESTHESIA ATTENDED CARE: Performed by: INTERNAL MEDICINE

## 2023-03-07 PROCEDURE — 88173 CYTOPATH EVAL FNA REPORT: CPT

## 2023-03-07 PROCEDURE — C1725 CATH, TRANSLUMIN NON-LASER: HCPCS | Performed by: INTERNAL MEDICINE

## 2023-03-07 PROCEDURE — 3609027000 HC BRONCHOSCOPY: Performed by: INTERNAL MEDICINE

## 2023-03-07 PROCEDURE — 7100000010 HC PHASE II RECOVERY - FIRST 15 MIN: Performed by: INTERNAL MEDICINE

## 2023-03-07 PROCEDURE — 7100000011 HC PHASE II RECOVERY - ADDTL 15 MIN: Performed by: INTERNAL MEDICINE

## 2023-03-07 PROCEDURE — 3603165200 HC BRNCHSC EBUS GUIDED SAMPL 1/2 NODE STATION/STRUX: Performed by: INTERNAL MEDICINE

## 2023-03-07 PROCEDURE — 94664 DEMO&/EVAL PT USE INHALER: CPT

## 2023-03-07 PROCEDURE — 2500000003 HC RX 250 WO HCPCS: Performed by: INTERNAL MEDICINE

## 2023-03-07 PROCEDURE — 7100000001 HC PACU RECOVERY - ADDTL 15 MIN: Performed by: INTERNAL MEDICINE

## 2023-03-07 PROCEDURE — 88112 CYTOPATH CELL ENHANCE TECH: CPT

## 2023-03-07 PROCEDURE — 3609020000 HC BRONCHOSCOPY W/EBUS FNA: Performed by: INTERNAL MEDICINE

## 2023-03-07 PROCEDURE — 2500000003 HC RX 250 WO HCPCS

## 2023-03-07 PROCEDURE — 3609011300 HC BRONCHOSCOPY BRONCHIAL/ENDOBRNCL BX 1+ SITES: Performed by: INTERNAL MEDICINE

## 2023-03-07 PROCEDURE — 3700000001 HC ADD 15 MINUTES (ANESTHESIA): Performed by: INTERNAL MEDICINE

## 2023-03-07 PROCEDURE — 88305 TISSUE EXAM BY PATHOLOGIST: CPT

## 2023-03-07 PROCEDURE — 6360000002 HC RX W HCPCS

## 2023-03-07 PROCEDURE — 2709999900 HC NON-CHARGEABLE SUPPLY: Performed by: INTERNAL MEDICINE

## 2023-03-07 PROCEDURE — 3609011100 HC BRONCHOSCOPY BRUSHINGS: Performed by: INTERNAL MEDICINE

## 2023-03-07 PROCEDURE — 2580000003 HC RX 258

## 2023-03-07 PROCEDURE — 71045 X-RAY EXAM CHEST 1 VIEW: CPT

## 2023-03-07 RX ORDER — SODIUM CHLORIDE 0.9 % (FLUSH) 0.9 %
5-40 SYRINGE (ML) INJECTION EVERY 12 HOURS SCHEDULED
Status: DISCONTINUED | OUTPATIENT
Start: 2023-03-07 | End: 2023-03-07 | Stop reason: HOSPADM

## 2023-03-07 RX ORDER — SODIUM CHLORIDE 9 MG/ML
INJECTION, SOLUTION INTRAVENOUS PRN
Status: DISCONTINUED | OUTPATIENT
Start: 2023-03-07 | End: 2023-03-07 | Stop reason: HOSPADM

## 2023-03-07 RX ORDER — FENTANYL CITRATE 50 UG/ML
25 INJECTION, SOLUTION INTRAMUSCULAR; INTRAVENOUS EVERY 5 MIN PRN
Status: DISCONTINUED | OUTPATIENT
Start: 2023-03-07 | End: 2023-03-07 | Stop reason: HOSPADM

## 2023-03-07 RX ORDER — MIDAZOLAM HYDROCHLORIDE 1 MG/ML
INJECTION INTRAMUSCULAR; INTRAVENOUS PRN
Status: DISCONTINUED | OUTPATIENT
Start: 2023-03-07 | End: 2023-03-07 | Stop reason: SDUPTHER

## 2023-03-07 RX ORDER — SODIUM CHLORIDE 0.9 % (FLUSH) 0.9 %
5-40 SYRINGE (ML) INJECTION PRN
Status: DISCONTINUED | OUTPATIENT
Start: 2023-03-07 | End: 2023-03-07 | Stop reason: HOSPADM

## 2023-03-07 RX ORDER — PROPOFOL 10 MG/ML
INJECTION, EMULSION INTRAVENOUS CONTINUOUS PRN
Status: DISCONTINUED | OUTPATIENT
Start: 2023-03-07 | End: 2023-03-07 | Stop reason: SDUPTHER

## 2023-03-07 RX ORDER — LIDOCAINE HYDROCHLORIDE 20 MG/ML
INJECTION, SOLUTION INTRAVENOUS PRN
Status: DISCONTINUED | OUTPATIENT
Start: 2023-03-07 | End: 2023-03-07 | Stop reason: SDUPTHER

## 2023-03-07 RX ORDER — ONDANSETRON 2 MG/ML
INJECTION INTRAMUSCULAR; INTRAVENOUS PRN
Status: DISCONTINUED | OUTPATIENT
Start: 2023-03-07 | End: 2023-03-07 | Stop reason: SDUPTHER

## 2023-03-07 RX ORDER — PROPOFOL 10 MG/ML
INJECTION, EMULSION INTRAVENOUS PRN
Status: DISCONTINUED | OUTPATIENT
Start: 2023-03-07 | End: 2023-03-07 | Stop reason: SDUPTHER

## 2023-03-07 RX ORDER — FENTANYL CITRATE 50 UG/ML
INJECTION, SOLUTION INTRAMUSCULAR; INTRAVENOUS PRN
Status: DISCONTINUED | OUTPATIENT
Start: 2023-03-07 | End: 2023-03-07 | Stop reason: SDUPTHER

## 2023-03-07 RX ORDER — KETAMINE HCL IN NACL, ISO-OSM 100MG/10ML
SYRINGE (ML) INJECTION PRN
Status: DISCONTINUED | OUTPATIENT
Start: 2023-03-07 | End: 2023-03-07 | Stop reason: SDUPTHER

## 2023-03-07 RX ORDER — FENTANYL CITRATE 50 UG/ML
50 INJECTION, SOLUTION INTRAMUSCULAR; INTRAVENOUS EVERY 5 MIN PRN
Status: DISCONTINUED | OUTPATIENT
Start: 2023-03-07 | End: 2023-03-07 | Stop reason: HOSPADM

## 2023-03-07 RX ORDER — DEXAMETHASONE SODIUM PHOSPHATE 10 MG/ML
INJECTION INTRAMUSCULAR; INTRAVENOUS PRN
Status: DISCONTINUED | OUTPATIENT
Start: 2023-03-07 | End: 2023-03-07 | Stop reason: SDUPTHER

## 2023-03-07 RX ORDER — SODIUM CHLORIDE 9 MG/ML
INJECTION, SOLUTION INTRAVENOUS CONTINUOUS PRN
Status: DISCONTINUED | OUTPATIENT
Start: 2023-03-07 | End: 2023-03-07 | Stop reason: SDUPTHER

## 2023-03-07 RX ORDER — PROCHLORPERAZINE EDISYLATE 5 MG/ML
5 INJECTION INTRAMUSCULAR; INTRAVENOUS
Status: DISCONTINUED | OUTPATIENT
Start: 2023-03-07 | End: 2023-03-07 | Stop reason: HOSPADM

## 2023-03-07 RX ORDER — IPRATROPIUM BROMIDE AND ALBUTEROL SULFATE 2.5; .5 MG/3ML; MG/3ML
1 SOLUTION RESPIRATORY (INHALATION)
Status: COMPLETED | OUTPATIENT
Start: 2023-03-07 | End: 2023-03-07

## 2023-03-07 RX ORDER — GLYCOPYRROLATE 0.2 MG/ML
INJECTION INTRAMUSCULAR; INTRAVENOUS PRN
Status: DISCONTINUED | OUTPATIENT
Start: 2023-03-07 | End: 2023-03-07 | Stop reason: SDUPTHER

## 2023-03-07 RX ORDER — ROCURONIUM BROMIDE 10 MG/ML
INJECTION, SOLUTION INTRAVENOUS PRN
Status: DISCONTINUED | OUTPATIENT
Start: 2023-03-07 | End: 2023-03-07 | Stop reason: SDUPTHER

## 2023-03-07 RX ADMIN — ROCURONIUM BROMIDE 20 MG: 10 INJECTION, SOLUTION INTRAVENOUS at 11:42

## 2023-03-07 RX ADMIN — LIDOCAINE HYDROCHLORIDE 100 MG: 20 INJECTION, SOLUTION INTRAVENOUS at 11:24

## 2023-03-07 RX ADMIN — Medication 30 MG: at 12:21

## 2023-03-07 RX ADMIN — MIDAZOLAM 2 MG: 1 INJECTION INTRAMUSCULAR; INTRAVENOUS at 11:23

## 2023-03-07 RX ADMIN — ROCURONIUM BROMIDE 50 MG: 10 INJECTION, SOLUTION INTRAVENOUS at 11:24

## 2023-03-07 RX ADMIN — SUGAMMADEX 300 MG: 100 INJECTION, SOLUTION INTRAVENOUS at 12:50

## 2023-03-07 RX ADMIN — SODIUM CHLORIDE: 9 INJECTION, SOLUTION INTRAVENOUS at 11:19

## 2023-03-07 RX ADMIN — MIDAZOLAM 1 MG: 1 INJECTION INTRAMUSCULAR; INTRAVENOUS at 12:37

## 2023-03-07 RX ADMIN — Medication 20 MG: at 11:24

## 2023-03-07 RX ADMIN — FENTANYL CITRATE 100 MCG: 50 INJECTION, SOLUTION INTRAMUSCULAR; INTRAVENOUS at 11:30

## 2023-03-07 RX ADMIN — ONDANSETRON 4 MG: 2 INJECTION INTRAMUSCULAR; INTRAVENOUS at 11:24

## 2023-03-07 RX ADMIN — DEXAMETHASONE SODIUM PHOSPHATE 10 MG: 10 INJECTION INTRAMUSCULAR; INTRAVENOUS at 11:24

## 2023-03-07 RX ADMIN — SODIUM CHLORIDE: 9 INJECTION, SOLUTION INTRAVENOUS at 13:02

## 2023-03-07 RX ADMIN — ROCURONIUM BROMIDE 10 MG: 10 INJECTION, SOLUTION INTRAVENOUS at 12:22

## 2023-03-07 RX ADMIN — GLYCOPYRROLATE 0.1 MG: 0.2 INJECTION INTRAMUSCULAR; INTRAVENOUS at 11:24

## 2023-03-07 RX ADMIN — MIDAZOLAM 1 MG: 1 INJECTION INTRAMUSCULAR; INTRAVENOUS at 13:10

## 2023-03-07 RX ADMIN — PROPOFOL 200 MG: 10 INJECTION, EMULSION INTRAVENOUS at 11:24

## 2023-03-07 RX ADMIN — IPRATROPIUM BROMIDE AND ALBUTEROL SULFATE 1 AMPULE: 2.5; .5 SOLUTION RESPIRATORY (INHALATION) at 13:55

## 2023-03-07 RX ADMIN — PROPOFOL 100 MCG/KG/MIN: 10 INJECTION, EMULSION INTRAVENOUS at 11:31

## 2023-03-07 ASSESSMENT — PAIN - FUNCTIONAL ASSESSMENT: PAIN_FUNCTIONAL_ASSESSMENT: 0-10

## 2023-03-07 ASSESSMENT — LIFESTYLE VARIABLES: SMOKING_STATUS: 1

## 2023-03-07 ASSESSMENT — PAIN SCALES - GENERAL: PAINLEVEL_OUTOF10: 0

## 2023-03-07 NOTE — PROGRESS NOTES
Patient to PACU. Appropriate monitors placed on patient. Cart locked and in lowest position with side rails up.   XR department called for STAT CXR

## 2023-03-07 NOTE — PROGRESS NOTES
Educated pt on incentive spirometer with good return demonstration. SpO2 desaturated to 88% on RA and RN placed pt back on 1L NC.  RN called RT for ordered duoneb treatment. Dr Leonard Guardado at bedside and looked at CXR.   Per Dr. Leonard Guardado, as long as vitals are WNL pt can D/C before official CXR results are in

## 2023-03-07 NOTE — ANESTHESIA POSTPROCEDURE EVALUATION
Department of Anesthesiology  Postprocedure Note    Patient: Lenin Smith  MRN: [id number]  YOB: 1955  Date of evaluation: 3/7/2023      Procedure Summary     Date: 03/07/23 Room / Location: [facility] 03 / [facility]    Anesthesia Start: 1119 Anesthesia Stop: 2301    Procedures:       BRONCHOSCOPY ENDOBRONCHIAL ULTRASOUND, TBB, FNA,TBL, CYTOLOGY      BRONCHOSCOPY BRUSHINGS      BRONCHOSCOPY W/EBUS FNA      BRONCHOSCOPY DIAGNOSTIC OR CELL [address] ONLY      BRONCHOSCOPY BIOPSY BRONCHUS Diagnosis:       Stage 4 malignant neoplasm of lung, unspecified laterality ([address]      (Stage 4 malignant neoplasm of lung, unspecified laterality ([address] [C34.90])    Surgeons: Tre Mckeon MD Responsible Provider: Oliverio Greco MD    Anesthesia Type: General ASA Status: 3          Anesthesia Type: General    Kayce Phase I: Kayce Score: 9    Kayce Phase II: Kayce Score: 10      Anesthesia Post Evaluation    Patient location during evaluation: PACU  Patient participation: complete - patient participated  Level of consciousness: awake and alert  Pain score: 0  Airway patency: patent  Nausea & Vomiting: no vomiting and no nausea  Complications: no  Cardiovascular status: hemodynamically stable  Respiratory status: spontaneous ventilation  Hydration status: stable

## 2023-03-07 NOTE — PROCEDURES
Bronchoscopy Inpatient Procedure Note    Date of Procedure: 3/7/2023    Pre-op Diagnosis: Mediastinal adenopathy, left hilar mass    Post-op Diagnosis: Same    Bronchoscopist: Navjot Cordon MD    Anesthesia: General anesthesia please see anesthesiology note  Procedure: Flexible fiberoptic bronchoscopy/EBUS    Estimated Blood Loss: Minimal     Complications: None    Indications and History      (Please see today's progress notes for the latest issues,  physical exam and lab data)    Consent to Procedure  The risks, benefits, complications, treatment options and expected outcomes were discussed with the patient and/or POA  The possibilities of reaction to medication, pulmonary aspiration, perforation of a viscus, bleeding, failure to diagnose a condition and creating a complication requiring transfusion or operation were discussed with the patient who freely signed the consent. Description of Procedure  The patient was intubated on mechanical ventilation and placed on 100% oxygen. Nancy Akers was monitored  the standard  monitoring devices. Dann Curry and the procedure were verified as Flexible Fiberoptic Bronchoscopy. A Time Out was held and the above information confirmed. The patient was placed in the appropriate position. The patient was sedated using propofol intravenously    The bronchoscope was then passed into the trachea via the ET tube. fter careful inspection of the tracheal, the bronchoscope was sequentially passed into all segments of the left and right endobronchial trees to the second and/or third divisions. Endobronchial findings      Trachea: No tracheal stenosis.   Normal mucosa  Rachelle  Normal mucosa and Sharp    Right Main Stem Bronchus  Normal mucosa  Right Upper Lobe Bronchi Normal mucosa  Right Middle Lobe Bronchi  Normal mucosa  Right Lower Lobe Bronchi (including the Superior segment)  Normal mucosa    Left Main Stem Bronchus Normal mucosa  Left Upper Lobe Bronchus, Upper Division Normal mucosa  Left Upper Lobe Bronchus, Lingula  Normal mucosa  Left Lower Lobe Bronchus (including the Superior segment) erythematous mucosa with extrinsic compression as shown in the image    Then the scope was switched to an EBUS scope. The scope was passed with ease via the ET. Direct visualization of the lymph nodes was obtained using endobronchial ultrasound (EBUS) guidance. A complete ultrasound lymph node exam was performed for lymph node stations 4, 7, 10 and 11. The following lymph nodes were subjected to EBUS guided biopsy using standard technique and in the following order:    1. Level 11 L x 7 transbronchial needle aspirations   2. Level 7x4 transbronchial needle aspiration               Specimens Taken    Washings -  Amount -5 cc location -left lower lobe  Brushings - Location -cytology brushing left lower lobe  Endobronchial Biopsies - Number of Biopsies -3 location(s) -left lower lobe  Following the third transbronchial there was some bleeding which was well controlled with cold saline washes and injection of 5000 units of thrombin. Repeat evaluation showed well-controlled of the bleeding no further bleeding.     Fabio Watson MD

## 2023-03-07 NOTE — ANESTHESIA PRE PROCEDURE
Department of Anesthesiology  Preprocedure Note       Name:  Ashley Bryant   Age:  74 y.o.  :  1955                                          MRN:  [id number]         Date:  3/7/2023      Surgeon: Pooja Fuller):  Jaimie Oquendo MD    Procedure: Procedure(s):  BRONCHOSCOPY ENDOBRONCHIAL ULTRASOUND, TBB, FNA,TBL, CYTOLOGY  BRONCHOSCOPY ALVEOLAR LAVAGE    Medications prior to admission:   Prior to Admission medications    Medication Sig Start Date End Date Taking? Authorizing Provider   escitalopram (LEXAPRO) 20 MG tablet TAKE 1 TABLET BY MOUTH EVERY DAY FOR DEPRESSION 22   Historical Provider, MD   hydroCHLOROthiazide (HYDRODIURIL) 12.5 MG tablet TAKE 1 TABLET BY MOUTH EVERY DAY HTN/EDEMA 23   Historical Provider, MD   HYDROcodone-acetaminophen (NORCO) 5-325 MG per tablet TAKE 1 TABLET BY MOUTH EVERY 6 HOURS (4 TIMES A DAY) AS NEEDED FOR PAIN 23   Historical Provider, MD   losartan (COZAAR) 50 MG tablet TAKE 1 TABLET BY MOUTH EVERYDAY FOR HYPERTENSION 22   Historical Provider, MD       Current medications:    Current Facility-Administered Medications   Medication Dose Route Frequency Provider Last Rate Last Admin    sodium chloride flush 0.9 % injection 5-40 mL  5-40 mL IntraVENous PRN Jaimie Oquendo MD           Allergies: Allergies   Allergen Reactions    Pcn [Penicillins] Other (See Comments)     Unknown reaction       Problem List:  There is no problem list on file for this patient. Past Medical History:        Diagnosis Date    Hypertension        Past Surgical History:  History reviewed. No pertinent surgical history.     Social History:    Social History     Tobacco Use    Smoking status: Every Day     Packs/day: 1.00     Years: 45.00     Pack years: 45.00     Types: Cigarettes    Smokeless tobacco: Never    Tobacco comments:     Now 3 cig /day   Substance Use Topics    Alcohol use: Yes     Comment: social                                Ready to quit: Not Answered  Counseling given: Not Answered  Tobacco comments: Now 3 cig /day      Vital Signs (Current):   Vitals:    03/01/23 1408 03/07/23 0928   BP:  (!) 168/78   Pulse:  80   Resp:  20   Temp:  98.8 °F (37.1 °C)   TempSrc:  Temporal   SpO2:  95%   Weight: 180 lb (81.6 kg) 180 lb (81.6 kg)   Height:  5' 6\" (1.676 m)                                              BP Readings from Last 3 Encounters:   03/07/23 (!) 168/78   02/21/23 (!) 141/79   01/25/23 (!) 163/73       NPO Status: Time of last liquid consumption: 2330                        Time of last solid consumption: 1900                        Date of last liquid consumption: 03/06/23                        Date of last solid food consumption: 03/06/23    BMI:   Wt Readings from Last 3 Encounters:   03/07/23 180 lb (81.6 kg)   02/21/23 185 lb (83.9 kg)   01/25/23 175 lb (79.4 kg)     Body mass index is 29.05 kg/m². CBC:   Lab Results   Component Value Date/Time    WBC 8.3 01/25/2023 06:32 PM    RBC 4.23 01/25/2023 06:32 PM    HGB 13.9 01/25/2023 06:32 PM    HCT 42.0 01/25/2023 06:32 PM    MCV 99.3 01/25/2023 06:32 PM    RDW 12.3 01/25/2023 06:32 PM     01/25/2023 06:32 PM       CMP:   Lab Results   Component Value Date/Time     01/25/2023 06:32 PM    K 4.1 01/25/2023 06:32 PM     01/25/2023 06:32 PM    CO2 27 01/25/2023 06:32 PM    BUN 15 01/25/2023 06:32 PM    CREATININE 0.7 01/25/2023 06:32 PM    GFRAA >60 12/23/2018 04:13 PM    LABGLOM >60 01/25/2023 06:32 PM    GLUCOSE 106 01/25/2023 06:32 PM    PROT 7.1 01/25/2023 06:32 PM    CALCIUM 9.6 01/25/2023 06:32 PM    BILITOT 0.4 01/25/2023 06:32 PM    ALKPHOS 81 01/25/2023 06:32 PM    AST 13 01/25/2023 06:32 PM    ALT 12 01/25/2023 06:32 PM       POC Tests: No results for input(s): POCGLU, POCNA, POCK, POCCL, POCBUN, POCHEMO, POCHCT in the last 72 hours.     Coags: No results found for: PROTIME, INR, APTT    HCG (If Applicable): No results found for: PREGTESTUR, PREGSERUM, HCG, HCGQUANT ABGs: No results found for: PHART, PO2ART, YYM4OPZ, NLI7ESA, BEART, N1MVKJOX     Type & Screen (If Applicable):  No results found for: LABABO, LABRH    Drug/Infectious Status (If Applicable):  No results found for: HIV, HEPCAB    COVID-19 Screening (If Applicable): No results found for: COVID19        Anesthesia Evaluation  Patient summary reviewed and Nursing notes reviewed no history of anesthetic complications:   Airway: Mallampati: III  TM distance: <3 FB   Neck ROM: full  Mouth opening: > = 3 FB   Dental: normal exam         Pulmonary:normal exam    (+) current smoker          Patient smoked on day of surgery. ROS comment: Raspy voice   Cardiovascular:    (+) hypertension:,                   Neuro/Psych:                ROS comment: tremors GI/Hepatic/Renal: Neg GI/Hepatic/Renal ROS            Endo/Other: Negative Endo/Other ROS                    Abdominal:             Vascular: negative vascular ROS. Other Findings:           Anesthesia Plan      general     ASA 3       Induction: intravenous. MIPS: Postoperative opioids intended and Prophylactic antiemetics administered. Anesthetic plan and risks discussed with patient. Plan discussed with CRNA.                     Orestes Guardado MD   3/7/2023

## 2023-03-07 NOTE — H&P
No chief complaint on file. HISTORY OF PRESENT ILLNESS:    Sukhjinder Wise is a very pleasant 74y.o. year old female here by Dr. Jamila Lozano  from blood and [address] for having an abnormal chest CT scan and evaluation for a diagnostic EBUS. Patient has a longstanding history of smoking she says smoke at least a pack since the age of 23. She presented to the emergency room department on January 25 with low back pain. She had a CT scan of her lumbar spine which showed a possible lytic lesion in her sacral area. Following that she had a designated CT of her chest and abdomen pelvis at [facility] which showed significant left hilar mass/adenopathy also with mediastinal adenopathy and she has been referred to us by Dr. Jamila Lozano for diagnostic bronchoscopy. Patient does have a raspy voice. Does not often have a cough. Denies any weight loss or shortness of breath and she is very active. Her major complaint is her back pain. ALLERGIES:    Allergies   Allergen Reactions    Pcn [Penicillins] Other (See Comments)     Unknown reaction       PAST MEDICAL HISTORY:       Diagnosis Date    Hypertension        MEDICATIONS:   Current Facility-Administered Medications   Medication Dose Route Frequency Provider Last Rate Last Admin    sodium chloride flush 0.9 % injection 5-40 mL  5-40 mL IntraVENous PRN Mariana Obando MD           SOCIAL AND OCCUPATIONAL HEALTH:  The patient is a Current smoker of a pack a day at least 45 pack years there  is not history of TB or TB exposure. There is not asbestos or silica dust exposure. The patient reports does not have coal, foundry, quarry or [employer] exposure. Travel history reveals none. There is not  history of recreational or IV drug use. There is not hot tube exposure. The patient does not pets, dogs, cats turtles or exotic birds. SURGICAL HISTORY:   History reviewed. No pertinent surgical history. History reviewed.  No pertinent family history.: No family history of cancer, blood clots     REVIEW OF SYSTEMS  Constitutional: Denies fever, weight loss, night sweats, and fatigue  Skin: Denies pigmentation, dark lesions, and rashes   HEENT: Denies hearing loss, tinnitus, ear drainage, epistaxis, sore throat, and hoarseness. Cardiovascular: Denies palpitations, chest pain, and chest pressure. Respiratory: Denies cough, dyspnea at rest, hemoptysis, apnea, and choking. Gastrointestinal: Denies nausea, vomiting, poor appetite, diarrhea, heartburn or reflux  Genitourinary: Denies dysuria, frequency, urgency or hematuria  Musculoskeletal: Denies myalgias, muscle weakness, and bone pain patient has had back pain  Neurological: Denies dizziness, vertigo, headache, and focal weakness  Psychological: Denies anxiety and depression  Endocrine: Denies heat intolerance and cold intolerance  Hematopoietic/Lymphatic: Denies bleeding problems and blood transfusions         PHYSICAL EXAMINATION:  BP (!) 141/79 (Site: Right Upper Arm)   Pulse 80   Temp 98.6 °F (37 °C) (Tympanic)   Resp 18   Ht 5' 6\" (1.676 m)   Wt 185 lb (83.9 kg)   SpO2 93%   BMI 29.86 kg/m²   GENERAL: Alert and oriented x3 in no acute distress. HEENT: Atraumatic. Normocephalic. Extraocular muscles are intact. Pupils are equal, round and reactive to light and accommodation. Sclera is nonicteric. Patient has Mallampati class IV  NECK: Supple. No JVD. No adenopathy. No bruits. CARDIOVASCULAR: Regular rate and rhythm. No murmur, gallop or thrills. LUNGS: Clear to auscultation bilaterally. There is severe tenderness at the junction of the rib cage and sternum at the cartilage level. ABDOMEN: Soft, nontender. No distention or organomegaly. EXTREMITIES: No clubbing, no cellulitis. Positive peripheral pulses, equal bilaterally. [facility]: [facility] personally reviewed [facility].         ASSESSMENT/PLAN    Monica Cerda was seen today for procedure.     Diagnoses and all orders for this visit:    Hilar mass    Mediastinal adenopathy    Nicotine dependence with current use    Goiter    Will proceed with a diagnostic bronchoscopy/EBUS today          Electronically signed by Arnol Harris MD,[facility] 3/7/2023 10:07 AM

## 2023-03-15 ENCOUNTER — TELEPHONE (OUTPATIENT)
Dept: PULMONOLOGY | Age: 68
End: 2023-03-15

## 2023-03-22 ENCOUNTER — HOSPITAL ENCOUNTER (OUTPATIENT)
Dept: MRI IMAGING | Age: 68
Discharge: HOME | End: 2023-03-24
Payer: COMMERCIAL

## 2023-03-22 ENCOUNTER — HOSPITAL ENCOUNTER (OUTPATIENT)
Age: 68
Discharge: HOME | End: 2023-03-22
Payer: COMMERCIAL

## 2023-03-22 DIAGNOSIS — C79.51 SECONDARY MALIGNANT NEOPLASM OF BONE (HCC): ICD-10-CM

## 2023-03-22 LAB
BUN SERPL-MCNC: 20 MG/DL (ref 6–23)
CREAT SERPL-MCNC: 0.8 MG/DL (ref 0.5–1)

## 2023-03-22 PROCEDURE — 6360000004 HC RX CONTRAST MEDICATION: Performed by: RADIOLOGY

## 2023-03-22 PROCEDURE — 82565 ASSAY OF CREATININE: CPT

## 2023-03-22 PROCEDURE — A9577 INJ MULTIHANCE: HCPCS | Performed by: RADIOLOGY

## 2023-03-22 PROCEDURE — 36415 COLL VENOUS BLD VENIPUNCTURE: CPT

## 2023-03-22 PROCEDURE — 84520 ASSAY OF UREA NITROGEN: CPT

## 2023-03-22 PROCEDURE — 70553 MRI BRAIN STEM W/O & W/DYE: CPT

## 2023-03-22 RX ADMIN — GADOBENATE DIMEGLUMINE 16 ML: 529 INJECTION, SOLUTION INTRAVENOUS at 16:13

## 2023-03-27 ENCOUNTER — HOSPITAL ENCOUNTER (OUTPATIENT)
Dept: NUCLEAR MEDICINE | Age: 68
Discharge: HOME | End: 2023-03-27
Payer: COMMERCIAL

## 2023-03-27 DIAGNOSIS — C34.12 SQUAMOUS CELL CARCINOMA OF BRONCHUS IN LEFT UPPER LOBE (HCC): ICD-10-CM

## 2023-03-27 DIAGNOSIS — C79.52 SECONDARY MALIGNANT NEOPLASM OF BONE AND BONE MARROW (HCC): ICD-10-CM

## 2023-03-27 DIAGNOSIS — C79.51 SECONDARY MALIGNANT NEOPLASM OF BONE AND BONE MARROW (HCC): ICD-10-CM

## 2023-03-27 PROCEDURE — 78815 PET IMAGE W/CT SKULL-THIGH: CPT

## 2023-03-27 PROCEDURE — A9552 F18 FDG: HCPCS | Performed by: RADIOLOGY

## 2023-03-27 PROCEDURE — 3430000000 HC RX DIAGNOSTIC RADIOPHARMACEUTICAL: Performed by: RADIOLOGY

## 2023-03-27 RX ORDER — FLUDEOXYGLUCOSE F 18 200 MCI/ML
10 INJECTION, SOLUTION INTRAVENOUS
Status: COMPLETED | OUTPATIENT
Start: 2023-03-27 | End: 2023-03-27

## 2023-03-27 RX ADMIN — FLUDEOXYGLUCOSE F 18 10 MILLICURIE: 200 INJECTION, SOLUTION INTRAVENOUS at 10:12

## 2023-03-28 ENCOUNTER — TELEPHONE (OUTPATIENT)
Dept: CASE MANAGEMENT | Age: 68
End: 2023-03-28

## 2023-03-28 ENCOUNTER — HOSPITAL ENCOUNTER (OUTPATIENT)
Dept: RADIATION ONCOLOGY | Age: 68
Discharge: HOME | End: 2023-03-28
Payer: COMMERCIAL

## 2023-03-28 VITALS
BODY MASS INDEX: 29.92 KG/M2 | RESPIRATION RATE: 18 BRPM | DIASTOLIC BLOOD PRESSURE: 72 MMHG | OXYGEN SATURATION: 97 % | HEART RATE: 96 BPM | SYSTOLIC BLOOD PRESSURE: 132 MMHG | WEIGHT: 185.4 LBS | TEMPERATURE: 97.1 F

## 2023-03-28 DIAGNOSIS — C79.31 METASTASIS TO BRAIN (HCC): Primary | ICD-10-CM

## 2023-03-28 PROCEDURE — 99205 OFFICE O/P NEW HI 60 MIN: CPT

## 2023-03-28 PROCEDURE — 99205 OFFICE O/P NEW HI 60 MIN: CPT | Performed by: SPECIALIST

## 2023-03-28 NOTE — TELEPHONE ENCOUNTER
Met with patient during her initial consultation with Dr Akanksha Dubon for her metastatic cancer diagnosis. Introduced myself and explained my role with patients receiving treatment at our center. Patient was friendly and receptive. She follows with Dr Sarai Bertrand for Medical Oncology. Next steps include MRI Brain, referral for consult with Dr Eddy Barillas with Neuro Surgery, CT simulation and Radiation planning and treatments per Dr Liu's recommendations and follow up care. Provided patient with literature  on [facility] Radiation Therapy Side Effects, [facility], [facility], [facility], and Community Transportation Resource List. Reviewed resources available including Social Work and Dietician. Provided with my contact information and encouraged patient to call me with questions or concerns. Verbalizes understanding. Patient appreciative of visit. Will continue to follow.

## 2023-03-28 NOTE — PROGRESS NOTES
weakness (ie: bathing/showering, dressing, housework, meal prep, work, , etc): No     Do you have any arm flexibility/ROM restrictions, swelling or pain that limit activity: No     Any changes in memory, attention/focus that impact daily activities: No     Do you avoid participation in leisure/social activity due to weakness, fatigue or pain: No     ARE ANY OF THE ABOVE ARE ANSWERED YES: No          PT ASSESSMENT FOR REFERRAL    Have you had any recent falls in the past 2 months: No     Do you have difficulty going up/down stairs: No     Are you having difficulty walking: No     Do you often hold onto furniture/environmental supports or feel off balance when you are walking: No     Do you need to take rest breaks when you are walking: No     Any pain on a scale of 1-10 that limits your mobility: No 0/10    ARE ANY OF THE ABOVE ARE ANSWERED YES: No           LYMPHEDEMA SCREENING ASSESSMENT FOR PATIENTS WITH BREAST CANCER    The patient reports the following signs/symptoms of lymphedema: None    Please ask the provider to assess patient for lymphedema for any reported signs or symptoms so a referral to Lymphedema Therapy can be considered. PELVIC FLOOR DYSFUNCTION SCREENING ASSESSMENT    - Do you have any pelvic pain? No     - Do you have any fecal constipation or fecal incontinence? No     - Do you have any urinary incontinence or difficulty starting to urinate? No     ARE ANY OF THE ABOVE ARE ANSWERED YES: No          PREHAB AUDIOLOGY REFERRAL    - Is patient planned to receive Cisplatin? No. This patient is not planned to start Cisplatin. - Is patient planned to receive radiation therapy that may be directed toward auditory canals or nerves? No. Patient is not planned to start radiation therapy to auditory canals or nerves. - Is patient complaining of new onset hearing loss? No. Patient is not complaining of new onset hearing loss.           Patient education given on radiation therapy to
2020 is used to help review treatment recommendations. I have referred her to our neurosurgeon Dr. Romie Reyes for consultation. Procedures and process involved with CT simulation and daily radiation treatments were explained. Toxicities, both expected and less common, were reviewed in detail. Questions were answered to their apparent satisfaction. Our face-to-face encounter lasted 65 minutes the majority of which was spent educating the patient reviewing her clinical history and creating a plan of care. Thank you for the opportunity to participate in multidisciplinary management of this remarkable and pleasant patient. Bahrat Butt MD, [address], Yonis Emerson, [address] [place] [address]    Department of Radiation Oncology  [facility]) [facility]: [phone] (ITD: [phone])  [address] [place], [place] [address]) [facility]: [phone] (JKE: [phone])  [facility] Pinky Speaker) [facility]:  [phone] (EZM:  [phone])    NOTE: This report was transcribed using voice recognition software. Every effort was made to ensure accuracy; however, inadvertent computerized transcription errors may be present.

## 2023-03-29 ENCOUNTER — TELEPHONE (OUTPATIENT)
Dept: ONCOLOGY | Age: 68
End: 2023-03-29

## 2023-03-29 ENCOUNTER — TELEPHONE (OUTPATIENT)
Dept: GENERAL RADIOLOGY | Age: 68
End: 2023-03-29

## 2023-03-29 NOTE — TELEPHONE ENCOUNTER
Attempted to contact pt re: positive distress screen. Message left requesting callback.     Ritu Devlin, MSW, LISW-S  Oncology Social Worker

## 2023-03-29 NOTE — TELEPHONE ENCOUNTER
I called the patient's insurance [facility] and I spoke with Linh Licea authorization rep., he stated authorization is required for CPT code 38945. So I submitted Clinicals and I received authorization. Authorization: [id number]  Valid Dates 3/29/2023 - 6/27/2023  Chiki MATHEWS) with Dr. Tiffanie Baugh office notified   I called the patient and informed her that she is scheduled at [facility] on 3/30/2023 at 2:00 pm Arrive by 1:30pm, No Metal, zippers, jewelry, or yoga pants. Patient confirmed.        Electronically signed by Carrie sOman on 3/29/23 at 3:15 PM EDT

## 2023-03-30 ENCOUNTER — HOSPITAL ENCOUNTER (OUTPATIENT)
Dept: MRI IMAGING | Age: 68
Discharge: HOME | End: 2023-04-01
Payer: COMMERCIAL

## 2023-03-30 DIAGNOSIS — C79.31 METASTASIS TO BRAIN (HCC): ICD-10-CM

## 2023-03-30 PROCEDURE — 6360000004 HC RX CONTRAST MEDICATION: Performed by: RADIOLOGY

## 2023-03-30 PROCEDURE — 70552 MRI BRAIN STEM W/DYE: CPT

## 2023-03-30 PROCEDURE — A9577 INJ MULTIHANCE: HCPCS | Performed by: RADIOLOGY

## 2023-03-30 RX ADMIN — GADOBENATE DIMEGLUMINE 38 ML: 529 INJECTION, SOLUTION INTRAVENOUS at 14:13

## 2023-03-31 ENCOUNTER — APPOINTMENT (OUTPATIENT)
Dept: MRI IMAGING | Age: 68
End: 2023-03-31
Payer: COMMERCIAL

## 2023-03-31 ENCOUNTER — HOSPITAL ENCOUNTER (OUTPATIENT)
Dept: CT IMAGING | Age: 68
End: 2023-03-31
Payer: COMMERCIAL

## 2023-03-31 VITALS
HEIGHT: 66 IN | SYSTOLIC BLOOD PRESSURE: 129 MMHG | DIASTOLIC BLOOD PRESSURE: 70 MMHG | BODY MASS INDEX: 28.93 KG/M2 | TEMPERATURE: 97.9 F | OXYGEN SATURATION: 96 % | HEART RATE: 74 BPM | RESPIRATION RATE: 18 BRPM | WEIGHT: 180 LBS

## 2023-03-31 DIAGNOSIS — C34.32 BRONCHOGENIC CARCINOMA OF LOWER LOBE OF LEFT LUNG (HCC): ICD-10-CM

## 2023-03-31 DIAGNOSIS — C79.52 SECONDARY MALIGNANT NEOPLASM OF BONE AND BONE MARROW (HCC): ICD-10-CM

## 2023-03-31 DIAGNOSIS — C79.51 SECONDARY MALIGNANT NEOPLASM OF BONE AND BONE MARROW (HCC): ICD-10-CM

## 2023-03-31 LAB
INR BLD: 1
PROTHROMBIN TIME: 10.7 SEC (ref 9.3–12.4)

## 2023-03-31 PROCEDURE — 20225 BONE BIOPSY TROCAR/NDL DEEP: CPT

## 2023-03-31 PROCEDURE — 6360000002 HC RX W HCPCS: Performed by: RADIOLOGY

## 2023-03-31 PROCEDURE — 88341 IMHCHEM/IMCYTCHM EA ADD ANTB: CPT

## 2023-03-31 PROCEDURE — 77012 CT SCAN FOR NEEDLE BIOPSY: CPT

## 2023-03-31 PROCEDURE — 36415 COLL VENOUS BLD VENIPUNCTURE: CPT

## 2023-03-31 PROCEDURE — 88311 DECALCIFY TISSUE: CPT

## 2023-03-31 PROCEDURE — 85610 PROTHROMBIN TIME: CPT

## 2023-03-31 PROCEDURE — 88307 TISSUE EXAM BY PATHOLOGIST: CPT

## 2023-03-31 PROCEDURE — 88342 IMHCHEM/IMCYTCHM 1ST ANTB: CPT

## 2023-03-31 PROCEDURE — 2500000003 HC RX 250 WO HCPCS: Performed by: RADIOLOGY

## 2023-03-31 RX ORDER — FENTANYL CITRATE 50 UG/ML
INJECTION, SOLUTION INTRAMUSCULAR; INTRAVENOUS
Status: COMPLETED | OUTPATIENT
Start: 2023-03-31 | End: 2023-03-31

## 2023-03-31 RX ORDER — LIDOCAINE HYDROCHLORIDE 20 MG/ML
INJECTION, SOLUTION INFILTRATION; PERINEURAL
Status: COMPLETED | OUTPATIENT
Start: 2023-03-31 | End: 2023-03-31

## 2023-03-31 RX ORDER — MIDAZOLAM HYDROCHLORIDE 2 MG/2ML
INJECTION, SOLUTION INTRAMUSCULAR; INTRAVENOUS
Status: COMPLETED | OUTPATIENT
Start: 2023-03-31 | End: 2023-03-31

## 2023-03-31 RX ADMIN — MIDAZOLAM HYDROCHLORIDE 1 MG: 1 INJECTION, SOLUTION INTRAMUSCULAR; INTRAVENOUS at 10:00

## 2023-03-31 RX ADMIN — FENTANYL CITRATE 50 MCG: 50 INJECTION, SOLUTION INTRAMUSCULAR; INTRAVENOUS at 10:00

## 2023-03-31 RX ADMIN — LIDOCAINE HYDROCHLORIDE 7 ML: 20 INJECTION, SOLUTION INFILTRATION; PERINEURAL at 10:02

## 2023-03-31 ASSESSMENT — PAIN - FUNCTIONAL ASSESSMENT: PAIN_FUNCTIONAL_ASSESSMENT: NONE - DENIES PAIN

## 2023-03-31 NOTE — PRE SEDATION
Sedation Pre-Procedure Note    Patient Name: Luli Salazar   YOB: 1955  Room/Bed: Room/bed info not found  Medical Record Number: [id number]  Date: 3/31/2023   Time: 9:46 AM       Indication:  sacral mass     Consent: I have discussed with the patient and/or the patient representative the indication, alternatives, and the possible risks and/or complications of the planned procedure and the anesthesia methods. The patient and/or patient representative appear to understand and agree to proceed. Vital Signs:   Vitals:    03/31/23 0905   BP: (!) 148/73   Pulse: 65   Resp: 18   Temp: 97.9 °F (36.6 °C)   SpO2: 96%       Past Medical History:   has a past medical history of Hypertension and Thyroid disease. Past Surgical History:   has a past surgical history that includes bronchoscopy (N/A, 3/7/2023); bronchoscopy (N/A, 3/7/2023); bronchoscopy (3/7/2023); bronchoscopy (3/7/2023); and bronchoscopy (3/7/2023). Medications:   Scheduled Meds:   Continuous Infusions:   PRN Meds:   Home Meds:   Prior to Admission medications    Medication Sig Start Date End Date Taking?  Authorizing Provider   escitalopram (LEXAPRO) 20 MG tablet TAKE 1 TABLET BY MOUTH EVERY DAY FOR DEPRESSION 11/17/22   Historical Provider, MD   hydroCHLOROthiazide (HYDRODIURIL) 12.5 MG tablet TAKE 1 TABLET BY MOUTH EVERY DAY HTN/EDEMA 1/22/23   Historical Provider, MD   HYDROcodone-acetaminophen (NORCO) 5-325 MG per tablet TAKE 1 TABLET BY MOUTH EVERY 6 HOURS (4 TIMES A DAY) AS NEEDED FOR PAIN 2/13/23   Historical Provider, MD   losartan (COZAAR) 50 MG tablet TAKE 1 TABLET BY MOUTH EVERYDAY FOR HYPERTENSION 11/27/22   Historical Provider, MD     Coumadin Use Last 7 Days:  no  Antiplatelet drug therapy use last 7 days: no  Other anticoagulant use last 7 days: no  Additional Medication Information:  na      Pre-Sedation Documentation and Exam:   I have personally completed a history, physical exam & review of systems for this patient (see notes).     Mallampati Airway Assessment:  Mallampati Class II - (soft palate, fauces & uvula are visible)    Prior History of Anesthesia Complications:   none    ASA Classification:  Class 2 - A normal healthy patient with mild systemic disease    Sedation/ Anesthesia Plan:   intravenous sedation    Medications Planned:   midazolam (Versed) intravenously and fentanyl intravenously    Patient is an appropriate candidate for plan of sedation: yes    Electronically signed by Deonte Luevano MD on 3/31/2023 at 9:46 AM

## 2023-03-31 NOTE — PROCEDURES
1030 Patient returned from procedure. Dressing checked, clean, dry, and intact. Patient stable. No s/s of complications noted or reported. Vitals will be checked q 15min, see flow sheets. 1045Patient eating and drinking well with no s/s of complications noted or reported. 1100Patient discharged, site was checked with every set of vitals. Site clean dry and intact. Discharge papers reviewed with patient, questions answered, discharge paper signed. Patient taken to door. Patient in stable condition, no s/s of complications noted or reported.

## 2023-04-04 ENCOUNTER — HOSPITAL ENCOUNTER (OUTPATIENT)
Dept: RADIATION ONCOLOGY | Age: 68
Discharge: HOME | End: 2023-04-04
Payer: COMMERCIAL

## 2023-04-04 ENCOUNTER — TELEPHONE (OUTPATIENT)
Dept: ONCOLOGY | Age: 68
End: 2023-04-04

## 2023-04-04 ENCOUNTER — OFFICE VISIT (OUTPATIENT)
Dept: NEUROSURGERY | Age: 68
End: 2023-04-04
Payer: COMMERCIAL

## 2023-04-04 VITALS
DIASTOLIC BLOOD PRESSURE: 72 MMHG | HEART RATE: 64 BPM | SYSTOLIC BLOOD PRESSURE: 137 MMHG | BODY MASS INDEX: 28.93 KG/M2 | WEIGHT: 180 LBS | OXYGEN SATURATION: 94 % | HEIGHT: 66 IN

## 2023-04-04 DIAGNOSIS — C34.90 NSCLC METASTATIC TO CEREBRAL MENINGES (HCC): Primary | ICD-10-CM

## 2023-04-04 DIAGNOSIS — C79.32 NSCLC METASTATIC TO CEREBRAL MENINGES (HCC): Primary | ICD-10-CM

## 2023-04-04 PROCEDURE — 6360000004 HC RX CONTRAST MEDICATION: Performed by: SPECIALIST

## 2023-04-04 PROCEDURE — 1123F ACP DISCUSS/DSCN MKR DOCD: CPT | Performed by: NEUROLOGICAL SURGERY

## 2023-04-04 PROCEDURE — 77334 RADIATION TREATMENT AID(S): CPT | Performed by: SPECIALIST

## 2023-04-04 PROCEDURE — 99204 OFFICE O/P NEW MOD 45 MIN: CPT | Performed by: NEUROLOGICAL SURGERY

## 2023-04-04 PROCEDURE — 99202 OFFICE O/P NEW SF 15 MIN: CPT

## 2023-04-04 RX ORDER — DEXAMETHASONE 2 MG/1
2 TABLET ORAL 2 TIMES DAILY WITH MEALS
COMMUNITY
Start: 2023-03-24

## 2023-04-04 RX ADMIN — IOPAMIDOL 100 ML: 755 INJECTION, SOLUTION INTRAVENOUS at 11:19

## 2023-04-04 NOTE — TELEPHONE ENCOUNTER
Met with pt in conjunction with radiation oncology SIM re: positive distress screen. Pt is 80-year-old female being managed for brain metastasis. Pt's mood appeared euthymic with full affect, she appeared A&Ox4, and she was willing/able to participate in session. He appeared appropriately dressed/groomed and was able to ambulate/transfer without assistance. Pt discussed recent diagnosis. She indicated that she has been experiencing increased pain since recent biopsy and reported that she does have follow up scheduled for later this week. Pt noted that she has strong support system. Reviewed role of oncology social work and pt denied any immediate psychosocial needs. Encouraged pt to notify this provider if needs arise.     ANGELO Gomez, CINDY-S  Oncology Social Worker

## 2023-04-04 NOTE — PROGRESS NOTES
[facility] NEUROSURGERY  [facility] [place] [id number]       Chief Complaint:   Chief Complaint   Patient presents with    Consultation     Metastasis to the brain  Pt states she had a biopsy done on her low back which is where her pain is. HPI:     I had the pleasure of seeing Efraim Hodgkins today in neurosurgical clinic. As you know, this delightful 79year old left handed, , childless current smoker social drinker and  presents  with new NSCLC. MRI of the brain was performed showing a solitary metastatic lesion. Upon specific questioning, she denied headache, seizure, numbness weakness or tingling.     Past Medical History:   Diagnosis Date    Hypertension     Thyroid disease      Past Surgical History:   Procedure Laterality Date    BRONCHOSCOPY N/A 3/7/2023    BRONCHOSCOPY ENDOBRONCHIAL ULTRASOUND, TBB, FNA,TBL, CYTOLOGY performed by Mirna Correia MD at [address] [place] [address] N/A 3/7/2023    BRONCHOSCOPY BRUSHINGS performed by Mirna Correia MD at [address] [place] [address]  3/7/2023    BRONCHOSCOPY W/EBUS FNA performed by Mirna Correia MD at [address] [place] [address]  3/7/2023    BRONCHOSCOPY DIAGNOSTIC OR CELL [address] ONLY performed by Mirna Correia MD at [address] [place] [address]  3/7/2023    BRONCHOSCOPY BIOPSY BRONCHUS performed by Mirna Correia MD at [address] [place] [address] BIOPSY PERCUTANEOUS DEEP BONE  3/31/2023    CT BIOPSY PERCUTANEOUS DEEP BONE 3/31/2023 Ander Melo MD [facility] CT      Family History   Problem Relation Age of Onset    Cancer Mother         pancreatic    Cancer Father         lung      Social History     Socioeconomic History    Marital status: Single     Spouse name: Not on file    Number of children: Not on file    Years of education: Not on file    Highest education level: Not on file   Occupational

## 2023-04-07 PROCEDURE — 77334 RADIATION TREATMENT AID(S): CPT | Performed by: SPECIALIST

## 2023-04-07 PROCEDURE — 77300 RADIATION THERAPY DOSE PLAN: CPT | Performed by: SPECIALIST

## 2023-04-07 PROCEDURE — 77295 3-D RADIOTHERAPY PLAN: CPT | Performed by: SPECIALIST

## 2023-04-11 DIAGNOSIS — C79.31 METASTASIS TO BRAIN (HCC): Primary | ICD-10-CM

## 2023-04-18 ENCOUNTER — HOSPITAL ENCOUNTER (OUTPATIENT)
Dept: RADIATION ONCOLOGY | Age: 68
Discharge: HOME | End: 2023-04-18
Payer: COMMERCIAL

## 2023-04-18 PROBLEM — C79.32 NSCLC METASTATIC TO CEREBRAL MENINGES (HCC): Status: ACTIVE | Noted: 2023-04-18

## 2023-04-18 PROBLEM — C34.90 NSCLC METASTATIC TO CEREBRAL MENINGES (HCC): Status: ACTIVE | Noted: 2023-04-18

## 2023-04-18 PROCEDURE — 61796 SRS CRANIAL LESION SIMPLE: CPT | Performed by: NEUROLOGICAL SURGERY

## 2023-04-18 PROCEDURE — 77373 STRTCTC BDY RAD THER TX DLVR: CPT | Performed by: SPECIALIST

## 2023-04-18 NOTE — OP NOTE
Operative Note        Date of Procedure: 18 April 2023    Pre-Op Diagnosis: Cerebral metastases    Post-Op Diagnosis: same       Procedure Performed:  Stereotactic Radiosurgery to metastatic lesion    Radiation Oncologist:  Drew Price MD    Staff Neurosurgeon: Julieth Manzano MD    Anaesthesia: none    Estimated Blood Loss (cc):  None    Complications:  No immediate    Specimens:  None    Implants:  None    Findings:N/A    Indications for the procedure: The patient is a 79year-old delightful woman with a history of NSCLC now with cerebral metastatic lesions. Therefore SRS treatment was offered. Detailed Description of the Procedure: We treated the right lateral frontal lesion today using Electa's ROS. I personally contoured this lesion using the treatment planning software. The treatment dose of 24 Mearl Nita was prescribed. The patient was brought to the suite and identified. She was positioned on the treatment table and her face was immobilized beneath the treatment mask. The treatment was performed in this in a single fraction and without immediate complication. I certify that I personally contoured this lesion and was present for the key portion of the procedure and was immediately available for the case in its entirety.      Electronically signed by Julieth Manzano MD on 4/18/2023 at 4:51 PM

## 2023-04-18 NOTE — PROGRESS NOTES
RADIATION ONCOLOGY PROCEDURE NOTE          Ms.Elizabeth YOLANDA Lopez underwent fractionated external beam radiation therapy using a SBRT / SABR technique. I was personally present for the treatment planning imaging and pt setup to ensure appropriate immobilization to meet current ROCK standards. After a detailed review of the treatment plan and appropriate physics QA / oversight this pt was scheduled and underwent hypo fractionated treatment as noted per the D/I in [facility]. Typical fractionation schemes include but are not limited to 2400 cGy in 1 fraction. The NCCN guidelines and pt workup including a detailed discussion of the risks, benefits and alternative were discussed previously [RTOG dose constraints met per plan as applicable- see Mosaiq]. The pt verbalized understanding for the risks of this procedure today prior to Tx. Today, after a dual identification time out (including a brief plan overview )- I personally reviewed the complex multifaceted immobilization apparatus +/- Synergy (PRN), patient position, and 4D imaging (if applicable) prior to the treatment delivery to ensure accuracy. The SBRT team, including myself, were all present for the set up CT scan and delivery of the fraction (the latter on a PRN basis). The patient successfully completed the procedure today in stable condition; this procedure was well-tolerated today. Kasie Wang has now received 2400 cGy in 1/1 fractions directed to the right lateral Frontal met. Michelle Salvador MD, [address], [facility], [address] [place] [address]    Department of Radiation Oncology  [facility]) [facility]: [phone] (QDO: [phone])  [address] [place] [address]: [phone] (BMJ: [phone])  [facility]) [facility]:  [phone] (HZD:  [phone])    NOTE: This report was transcribed using voice recognition software.  Every effort was made to ensure accuracy; however, inadvertent computerized transcription errors may be

## 2023-04-20 ENCOUNTER — HOSPITAL ENCOUNTER (OUTPATIENT)
Dept: RADIATION ONCOLOGY | Age: 68
Discharge: HOME | End: 2023-04-20
Payer: COMMERCIAL

## 2023-04-20 VITALS
OXYGEN SATURATION: 97 % | TEMPERATURE: 97.6 F | DIASTOLIC BLOOD PRESSURE: 82 MMHG | HEART RATE: 84 BPM | SYSTOLIC BLOOD PRESSURE: 134 MMHG | WEIGHT: 186.9 LBS | RESPIRATION RATE: 18 BRPM | BODY MASS INDEX: 30.17 KG/M2

## 2023-04-20 PROCEDURE — 77373 STRTCTC BDY RAD THER TX DLVR: CPT | Performed by: SPECIALIST

## 2023-04-20 PROCEDURE — 77334 RADIATION TREATMENT AID(S): CPT | Performed by: SPECIALIST

## 2023-04-20 PROCEDURE — 77290 THER RAD SIMULAJ FIELD CPLX: CPT | Performed by: SPECIALIST

## 2023-04-20 PROCEDURE — 61796 SRS CRANIAL LESION SIMPLE: CPT | Performed by: NEUROLOGICAL SURGERY

## 2023-04-20 ASSESSMENT — PAIN DESCRIPTION - LOCATION: LOCATION: BACK

## 2023-04-20 ASSESSMENT — PAIN SCALES - GENERAL: PAINLEVEL_OUTOF10: 4

## 2023-04-20 NOTE — PROGRESS NOTES
Maryan Silva  4/20/2023  7:29 AM          Current Outpatient Medications   Medication Sig Dispense Refill    dexamethasone (DECADRON) 2 MG tablet Take 1 tablet by mouth 2 times daily (with meals)      escitalopram (LEXAPRO) 20 MG tablet TAKE 1 TABLET BY MOUTH EVERY DAY FOR DEPRESSION      hydroCHLOROthiazide (HYDRODIURIL) 12.5 MG tablet TAKE 1 TABLET BY MOUTH EVERY DAY HTN/EDEMA      HYDROcodone-acetaminophen (NORCO) 5-325 MG per tablet TAKE 1 TABLET BY MOUTH EVERY 6 HOURS (4 TIMES A DAY) AS NEEDED FOR PAIN      losartan (COZAAR) 50 MG tablet TAKE 1 TABLET BY MOUTH EVERYDAY FOR HYPERTENSION       No current facility-administered medications for this encounter. This is an up-to-date medication list.    Please take this list to your next care provider, and discard any previous medication lists.

## 2023-04-20 NOTE — OP NOTE
Operative Note      Patient: Wilfred Barrera  YOB: 1955  MRN: [id number]      Date of Procedure: 20 April 2023    Pre-Op Diagnosis: Cerebral metastases    Post-Op Diagnosis: same       Procedure Performed:  Stereotactic Radiosurgery to metastatic lesion    Radiation Oncologist:  Wilbur Litten, MD    Staff Neurosurgeon: Clair Daniels MD    Anaesthesia: none    Estimated Blood Loss (cc):  None    Complications:  No immediate    Specimens:  None    Implants:  None    Findings:N/A    Indications for the procedure: The patient is a 79year-old delightful woman with a history of NSCLC with cerebral metastases. Therefore SRS treatment was offered. Detailed Description of the Procedure: We treated the right high posterior frontal lesion today using Electa's ROS. I personally contoured this lesion using the treatment planning software. The treatment dose of 24 Lynn Samuel was prescribed. The patient was brought to the suite and identified. She was positioned on the treatment table and her face was immobilized beneath the treatment mask. The treatment was performed in this in a single fraction and without immediate complication. I certify that I personally contoured this lesion and was present for the key portion of the procedure and was immediately available for the case in its entirety.      Electronically signed by Clair Daniels MD on 4/20/2023 at 10:40 AM

## 2023-04-20 NOTE — PROGRESS NOTES
RADIATION ONCOLOGY PROCEDURE NOTE          Ms.Elizabeth YOLANDA Lopez underwent fractionated external beam radiation therapy using a SBRT / SABR technique. I was personally present for the treatment planning  imaging and pt setup to ensure appropriate immobilization to meet current ROCK standards. After a detailed review of the treatment plan and appropriate physics QA / oversight this pt was scheduled and underwent hypo fractionated treatment as noted per the D/I in [facility]. Typical fractionation schemes include but are not limited to 2400 cGy in 1 fraction. The NCCN guidelines and pt workup including a detailed discussion of the risks, benefits and alternative were discussed previously [RTOG dose constraints met per plan as applicable- see Mosaiq]. The pt verbalized understanding for the risks of this procedure today prior to Tx. Today, after a dual identification time out (including a brief plan overview )- I personally reviewed the complex multifaceted immobilization apparatus W +/- Synergy (PRN), patient position, and 4D imaging (if applicable) prior to the treatment delivery to ensure accuracy. The SBRT team, including myself, were all present for the set up CT scan and delivery of the fraction (the latter on a PRN basis). The patient successfully completed the procedure today in stable condition; this procedure was well-tolerated today. Kathryn Bolivar has now received 2400 cGy in 1/1 fractions directed to the right high posterior Frontal met. Alexander Chester MD, [address], Renita Roldan, [address] [place] [address]    Department of Radiation Oncology  [facility]: [phone] (UDR: [phone])  [address] [place] [address]: [phone] (GZQ: [phone])  [facility]:  [phone] (VPP:  [phone])    NOTE: This report was transcribed using voice recognition software.  Every effort was made to ensure accuracy; however, inadvertent computerized transcription errors

## 2023-04-20 NOTE — PROGRESS NOTES
Terrace Rear  4/20/2023  Wt Readings from Last 3 Encounters:   04/20/23 186 lb 14.4 oz (84.8 kg)   04/04/23 180 lb (81.6 kg)   03/31/23 180 lb (81.6 kg)     Body mass index is 30.17 kg/m². Treatment Area:SRS frontal    Patient was seen today for weekly visit. Comfort Alteration  KPS:90%  Fatigue: Mild      CNS Alteration  Orientation: time place person  Neuropathy-Motor: no  Ataxia: Absent   Speech Impairment: No  Seizures: No  Urinary Incontinence: No  Bowel Incontinence: No  Insomnia: No    Sensory Alteration: no    Nutritional Alteration  Anorexia: No   Nausea: No   Vomiting: No    Skin Alteration   Sensation:no    Radiation Dermatitis:  no    Alopecia: No    Emotional  Coping: effective    Injury, potential bleeding or infection: no          Lab Results   Component Value Date    GLUCOSE 106 (H) 01/25/2023       /82   Pulse 84   Temp 97.6 °F (36.4 °C) (Temporal)   Resp 18   Wt 186 lb 14.4 oz (84.8 kg)   SpO2 97%   BMI 30.17 kg/m²   BP within normal range? yes        Assessment/Plan: Pt completed her SRS treatment. Discharge instructions given.     Sheryl Muahmmad RN

## 2023-04-20 NOTE — PROGRESS NOTES
Radiation Treatment Summary    Patient Name:  Link Daley,  1955,  79 y.o., female       Referring Physician: Nikhil Coughlin MD  [id number] [facility],[address]  [place],  [address] [place] [address]      PCP: Maritza Vazquez DO       Diagnosis: Brain metastases      Site Start TX Last [place] ED Fractions Dose (cGy) Fx Dose (cGy) Technique   Right lateral Frontal met 4/18/2023 4/18/2023 0 1 2400 2400 SRS   Right high posterior Frontal met 4/20/2023 4/20/2023 0 1 2400 2400 SRS       Response/Tolerance: Cammy tolerated treatment well without acute side effects. Post-SRS care instructions given. RTC in 30 days. Follow-up:  30-day in the office with Radiation Oncology      Thank you for the opportunity to participate in multidisciplinary management of this remarkable and pleasant patient. Karna Hammans, MD, [address], Amrik Sykes, [facility]    Department of Radiation Oncology  [facility]) [facility]: [phone] (GFR: [phone])  [facility]: [phone] (ISE: [phone])  [address] [facility]) [facility]:  [phone] (QZT:  [phone])    NOTE: This report was transcribed using voice recognition software. Every effort was made to ensure accuracy; however, inadvertent computerized transcription errors may be present.

## 2023-04-21 ENCOUNTER — HOSPITAL ENCOUNTER (OUTPATIENT)
Dept: RADIATION ONCOLOGY | Age: 68
Discharge: HOME | End: 2023-04-21
Payer: COMMERCIAL

## 2023-04-26 ENCOUNTER — HOSPITAL ENCOUNTER (OUTPATIENT)
Dept: RADIATION ONCOLOGY | Age: 68
Discharge: HOME | End: 2023-04-26
Payer: COMMERCIAL

## 2023-04-26 PROCEDURE — 77387 GUIDANCE FOR RADJ TX DLVR: CPT | Performed by: SPECIALIST

## 2023-04-26 PROCEDURE — 77412 RADIATION TX DELIVERY LVL 3: CPT | Performed by: SPECIALIST

## 2023-04-27 ENCOUNTER — HOSPITAL ENCOUNTER (OUTPATIENT)
Dept: RADIATION ONCOLOGY | Age: 68
Discharge: HOME | End: 2023-04-27
Payer: COMMERCIAL

## 2023-04-27 PROCEDURE — 77412 RADIATION TX DELIVERY LVL 3: CPT | Performed by: SPECIALIST

## 2023-04-27 PROCEDURE — 77014 CHG CT GUIDANCE RADIATION THERAPY FLDS PLACEMENT: CPT | Performed by: SPECIALIST

## 2023-04-27 PROCEDURE — 77387 GUIDANCE FOR RADJ TX DLVR: CPT | Performed by: SPECIALIST

## 2023-04-28 ENCOUNTER — HOSPITAL ENCOUNTER (OUTPATIENT)
Dept: RADIATION ONCOLOGY | Age: 68
Discharge: HOME | End: 2023-04-28
Payer: COMMERCIAL

## 2023-04-28 PROCEDURE — 77387 GUIDANCE FOR RADJ TX DLVR: CPT | Performed by: SPECIALIST

## 2023-04-28 PROCEDURE — 77412 RADIATION TX DELIVERY LVL 3: CPT | Performed by: SPECIALIST

## 2023-05-01 ENCOUNTER — HOSPITAL ENCOUNTER (OUTPATIENT)
Dept: RADIATION ONCOLOGY | Age: 68
Discharge: HOME | End: 2023-05-01
Payer: COMMERCIAL

## 2023-05-01 PROCEDURE — 77412 RADIATION TX DELIVERY LVL 3: CPT | Performed by: SPECIALIST

## 2023-05-01 PROCEDURE — 77014 CHG CT GUIDANCE RADIATION THERAPY FLDS PLACEMENT: CPT | Performed by: SPECIALIST

## 2023-05-01 PROCEDURE — 77387 GUIDANCE FOR RADJ TX DLVR: CPT | Performed by: SPECIALIST

## 2023-05-01 NOTE — PROGRESS NOTES
Raegan Jamal  5/1/2023  7:27 AM          Current Outpatient Medications   Medication Sig Dispense Refill    dexamethasone (DECADRON) 2 MG tablet Take 1 tablet by mouth 2 times daily (with meals)      escitalopram (LEXAPRO) 20 MG tablet TAKE 1 TABLET BY MOUTH EVERY DAY FOR DEPRESSION      hydroCHLOROthiazide (HYDRODIURIL) 12.5 MG tablet TAKE 1 TABLET BY MOUTH EVERY DAY HTN/EDEMA      HYDROcodone-acetaminophen (NORCO) 5-325 MG per tablet TAKE 1 TABLET BY MOUTH EVERY 6 HOURS (4 TIMES A DAY) AS NEEDED FOR PAIN      losartan (COZAAR) 50 MG tablet TAKE 1 TABLET BY MOUTH EVERYDAY FOR HYPERTENSION       No current facility-administered medications for this encounter. This is an up-to-date medication list.    Please take this list to your next care provider, and discard any previous medication lists.

## 2023-05-01 NOTE — PROGRESS NOTES
Rad onc nurse gave patient discharge instructions and all questions answered from a nursing perspective. She is keeping her follow up appointment from her [facility]. Home with family.

## 2023-05-16 ENCOUNTER — HOSPITAL ENCOUNTER (OUTPATIENT)
Dept: RADIATION ONCOLOGY | Age: 68
Discharge: HOME | End: 2023-05-16
Payer: COMMERCIAL

## 2023-05-16 VITALS
RESPIRATION RATE: 18 BRPM | TEMPERATURE: 97.6 F | DIASTOLIC BLOOD PRESSURE: 69 MMHG | WEIGHT: 175 LBS | HEART RATE: 85 BPM | BODY MASS INDEX: 28.25 KG/M2 | SYSTOLIC BLOOD PRESSURE: 131 MMHG | OXYGEN SATURATION: 97 %

## 2023-05-16 DIAGNOSIS — Z08 ENCOUNTER FOR FOLLOW-UP EXAMINATION AFTER COMPLETED TREATMENT FOR MALIGNANT NEOPLASM: ICD-10-CM

## 2023-05-16 DIAGNOSIS — Z08 ENCOUNTER FOR FOLLOW-UP SURVEILLANCE OF BRAIN CANCER: ICD-10-CM

## 2023-05-16 DIAGNOSIS — Z92.3 STATUS POST STEREOTACTIC RADIOSURGERY: ICD-10-CM

## 2023-05-16 DIAGNOSIS — Z85.841 ENCOUNTER FOR FOLLOW-UP SURVEILLANCE OF BRAIN CANCER: ICD-10-CM

## 2023-05-16 DIAGNOSIS — C79.31 SECONDARY CANCER OF BRAIN (HCC): ICD-10-CM

## 2023-05-16 DIAGNOSIS — C79.51 SECONDARY CANCER OF BONE (HCC): Primary | ICD-10-CM

## 2023-05-16 DIAGNOSIS — C79.31 METASTASIS TO BRAIN (HCC): Primary | ICD-10-CM

## 2023-05-16 PROCEDURE — 99999 PR OFFICE/OUTPT VISIT,PROCEDURE ONLY: CPT | Performed by: NURSE PRACTITIONER

## 2023-05-16 RX ORDER — PROCHLORPERAZINE MALEATE 10 MG
TABLET ORAL
COMMUNITY
Start: 2023-04-10

## 2023-05-16 RX ORDER — MELOXICAM 15 MG/1
15 TABLET ORAL DAILY
COMMUNITY
Start: 2023-05-04

## 2023-05-16 RX ORDER — GABAPENTIN 300 MG/1
300 CAPSULE ORAL NIGHTLY
COMMUNITY
Start: 2023-05-04

## 2023-05-16 RX ORDER — ONDANSETRON 8 MG/1
TABLET, ORALLY DISINTEGRATING ORAL
COMMUNITY
Start: 2023-04-10

## 2023-05-16 RX ORDER — OXYCODONE AND ACETAMINOPHEN 10; 325 MG/1; MG/1
1 TABLET ORAL EVERY 6 HOURS PRN
COMMUNITY
Start: 2023-04-18

## 2023-05-16 RX ORDER — FENTANYL 25 UG/H
PATCH TRANSDERMAL
COMMUNITY
Start: 2023-05-10

## 2023-05-16 ASSESSMENT — PAIN DESCRIPTION - ORIENTATION: ORIENTATION: LEFT

## 2023-05-16 ASSESSMENT — PAIN DESCRIPTION - LOCATION: LOCATION: KNEE;NECK

## 2023-05-16 ASSESSMENT — PAIN SCALES - GENERAL: PAINLEVEL_OUTOF10: 7

## 2023-05-16 NOTE — PROGRESS NOTES
RADIATION ONCOLOGY  4 week follow up  05/16/2023         NAME:  Leslie Simon    YOB: 1955    Diagnosis:  Secondary cancer of brain. Secondary cancer of bone. 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 CNS and bone metastases. S/p stereotactic radiosurgery [facility]):  Right lateral frontal met, SRS 2400 cGy/1 fraction on 04/18/2023. Right high posterior frontal met, SRS 2400 cGy/ 1 fraction on 04/20/2023. Right sacrum bone met, SRS 2400 cGy/ 4 fraction completed 05/01/2023. The patient is seen today in 4 week post SRS symptom management check. The patient is accompanied by her Rhett Mensah into the exam room. The patient is tearful complains of increased fatigue with left side body swelling and generalized body aches with worse site of pain present to left knee. The patient also reports feeling neck stiffness but is able to perform neck ROM without pain. The patient reports receiving a shot at Medical Oncology on Thursday which she thought was [place]ly but was actually Lannis Cruise (called [facility] and confirmed with RN). The patient reports on Saturday she developed generalized body aches and noted swelling behind left knee, then swelling present to injection site left upper arm and swelling to left wrist. After further discussion patient is S/p fall loss balance fell to hard surface floor on left knee on 05/03/2023. The patient denies headache, seizure activity or dizziness. No behavior changes, memory loss or confusion. No diplopia, blurred vision or loss of vision. No change in hearing, earache or tinnitus. No change in taste or smell. No dysphagia, odynophagia or esophageal reflux. Reports episodes of nausea, controlled taking antiemetic medication as prescribed per a different provider.  The patient reports felt shaky on Gabapentin, Gabapentin decreased to at night only dosing per

## 2023-05-16 NOTE — PROGRESS NOTES
Radha Loor  5/16/2023  2:08 PM      Vitals:    05/16/23 1405   BP: 131/69   Pulse: 85   Resp: 18   Temp: 97.6 °F (36.4 °C)   SpO2: 97%    : Wt Readings from Last 3 Encounters:   05/16/23 175 lb (79.4 kg)   04/20/23 186 lb 14.4 oz (84.8 kg)   04/04/23 180 lb (81.6 kg)                Current Outpatient Medications:     dexamethasone (DECADRON) 2 MG tablet, Take 1 tablet by mouth 2 times daily (with meals), Disp: , Rfl:     escitalopram (LEXAPRO) 20 MG tablet, TAKE 1 TABLET BY MOUTH EVERY DAY FOR DEPRESSION, Disp: , Rfl:     hydroCHLOROthiazide (HYDRODIURIL) 12.5 MG tablet, TAKE 1 TABLET BY MOUTH EVERY DAY HTN/EDEMA, Disp: , Rfl:     HYDROcodone-acetaminophen (NORCO) 5-325 MG per tablet, TAKE 1 TABLET BY MOUTH EVERY 6 HOURS (4 TIMES A DAY) AS NEEDED FOR PAIN, Disp: , Rfl:     losartan (COZAAR) 50 MG tablet, TAKE 1 TABLET BY MOUTH EVERYDAY FOR HYPERTENSION, Disp: , Rfl:       Patient is seen today in follow up for completed SRS frontal LAT RT 4/18/23-4/20/23 2fx. PTV RT sacrum 4/26/23-5/1/23 4fx/2000cGy. Patient went and got a Keytruda shot last Thursday and developed last weekend swelling on the left side of her body. Her left knee is giving her 7/10 pain and gets worst when she walks. FALLS RISK SCREENING ASSESSMENT    Instructions:  Assess the patient and enter the appropriate indicators that are present for fall risk identification. Total the numbers entered and assign a fall risk score from Table 2.  Reassess patient at a minimum every 12 weeks or with status change. Assessment   Date  5/16/2023     1. Mental Ability: confusion/cognitively impaired No - 0       2. Elimination Issues: incontinence, frequency No - 0       3. Ambulatory: use of assistive devices (walker, cane, off-loading devices), attached to equipment (IV pole, oxygen) Yes - 2     4. Sensory Limitations: dizziness, vertigo, impaired vision No - 0       5. Age 72 years or greater - 1       10.   Medication: diuretics,

## 2023-05-17 PROBLEM — C79.31 SECONDARY CANCER OF BRAIN (HCC): Status: ACTIVE | Noted: 2023-03-22

## 2023-05-17 PROBLEM — C34.32 MALIGNANT NEOPLASM OF LOWER LOBE OF LEFT LUNG (HCC): Status: ACTIVE | Noted: 2023-02-14

## 2023-05-17 PROBLEM — C79.51 SECONDARY CANCER OF BONE (HCC): Status: ACTIVE | Noted: 2023-03-31

## 2023-05-18 ENCOUNTER — APPOINTMENT (OUTPATIENT)
Dept: RADIATION ONCOLOGY | Age: 68
End: 2023-05-18
Payer: COMMERCIAL

## 2023-05-22 ENCOUNTER — HOSPITAL ENCOUNTER (OUTPATIENT)
Dept: GENERAL RADIOLOGY | Age: 68
Discharge: HOME | End: 2023-05-24
Payer: COMMERCIAL

## 2023-05-22 ENCOUNTER — HOSPITAL ENCOUNTER (OUTPATIENT)
Age: 68
Discharge: HOME | End: 2023-05-24
Payer: COMMERCIAL

## 2023-05-22 DIAGNOSIS — R52 PAIN: ICD-10-CM

## 2023-05-22 PROCEDURE — 73560 X-RAY EXAM OF KNEE 1 OR 2: CPT

## 2023-05-22 PROCEDURE — 73130 X-RAY EXAM OF HAND: CPT

## 2023-05-22 PROCEDURE — 73610 X-RAY EXAM OF ANKLE: CPT

## 2023-05-22 PROCEDURE — 73110 X-RAY EXAM OF WRIST: CPT

## 2023-06-16 ENCOUNTER — HOSPITAL ENCOUNTER (OUTPATIENT)
Dept: MRI IMAGING | Age: 68
Discharge: HOME | End: 2023-06-18
Payer: COMMERCIAL

## 2023-06-16 DIAGNOSIS — Z08 ENCOUNTER FOR FOLLOW-UP SURVEILLANCE OF BRAIN CANCER: ICD-10-CM

## 2023-06-16 DIAGNOSIS — Z92.3 STATUS POST STEREOTACTIC RADIOSURGERY: ICD-10-CM

## 2023-06-16 DIAGNOSIS — C79.31 METASTASIS TO BRAIN (HCC): ICD-10-CM

## 2023-06-16 DIAGNOSIS — Z08 ENCOUNTER FOR FOLLOW-UP EXAMINATION AFTER COMPLETED TREATMENT FOR MALIGNANT NEOPLASM: ICD-10-CM

## 2023-06-16 DIAGNOSIS — Z85.841 ENCOUNTER FOR FOLLOW-UP SURVEILLANCE OF BRAIN CANCER: ICD-10-CM

## 2023-06-16 PROCEDURE — A9577 INJ MULTIHANCE: HCPCS | Performed by: RADIOLOGY

## 2023-06-16 PROCEDURE — 70553 MRI BRAIN STEM W/O & W/DYE: CPT

## 2023-06-16 PROCEDURE — 6360000004 HC RX CONTRAST MEDICATION: Performed by: RADIOLOGY

## 2023-06-16 RX ADMIN — GADOBENATE DIMEGLUMINE 32 ML: 529 INJECTION, SOLUTION INTRAVENOUS at 15:35

## 2023-06-22 ENCOUNTER — HOSPITAL ENCOUNTER (OUTPATIENT)
Dept: RADIATION ONCOLOGY | Age: 68
Discharge: HOME | End: 2023-06-22

## 2023-06-22 VITALS
DIASTOLIC BLOOD PRESSURE: 61 MMHG | BODY MASS INDEX: 27.58 KG/M2 | WEIGHT: 170.9 LBS | RESPIRATION RATE: 18 BRPM | TEMPERATURE: 97.1 F | HEART RATE: 66 BPM | OXYGEN SATURATION: 98 % | SYSTOLIC BLOOD PRESSURE: 120 MMHG

## 2023-06-22 DIAGNOSIS — Z08 ENCOUNTER FOR FOLLOW-UP EXAMINATION AFTER COMPLETED TREATMENT FOR MALIGNANT NEOPLASM: ICD-10-CM

## 2023-06-22 DIAGNOSIS — C79.31 SECONDARY CANCER OF BRAIN (HCC): Primary | ICD-10-CM

## 2023-06-22 DIAGNOSIS — C79.31 SECONDARY CANCER OF BRAIN (HCC): ICD-10-CM

## 2023-06-22 DIAGNOSIS — Z08 ENCOUNTER FOR FOLLOW-UP SURVEILLANCE OF BRAIN CANCER: Primary | ICD-10-CM

## 2023-06-22 DIAGNOSIS — Z92.3 STATUS POST STEREOTACTIC RADIOSURGERY: ICD-10-CM

## 2023-06-22 DIAGNOSIS — Z85.841 ENCOUNTER FOR FOLLOW-UP SURVEILLANCE OF BRAIN CANCER: Primary | ICD-10-CM

## 2023-06-22 RX ORDER — FUROSEMIDE 20 MG/1
10 TABLET ORAL DAILY
COMMUNITY
Start: 2023-06-15 | End: 2023-06-25

## 2023-06-22 RX ORDER — PRIMIDONE 50 MG/1
50 TABLET ORAL DAILY
COMMUNITY
Start: 2023-06-16

## 2023-06-22 RX ORDER — PREDNISONE 10 MG/1
10 TABLET ORAL DAILY
COMMUNITY
Start: 2023-06-15

## 2023-06-22 NOTE — PROGRESS NOTES
Kayla Villavicencio  6/22/2023  2:08 PM      Vitals:    06/22/23 1404   BP: 120/61   Pulse: 66   Resp: 18   Temp: 97.1 °F (36.2 °C)   SpO2: 98%    : Wt Readings from Last 3 Encounters:   06/22/23 170 lb 14.4 oz (77.5 kg)   05/16/23 175 lb (79.4 kg)   04/20/23 186 lb 14.4 oz (84.8 kg)                Current Outpatient Medications:     fentaNYL (DURAGESIC) 25 MCG/HR, APPLY 1 PATCH TOPICALL EVERY 72 HOURS, Disp: , Rfl:     gabapentin (NEURONTIN) 300 MG capsule, Take 1 capsule by mouth nightly., Disp: , Rfl:     meloxicam (MOBIC) 15 MG tablet, Take 1 tablet by mouth daily with food, Disp: , Rfl:     ondansetron (ZOFRAN-ODT) 8 MG TBDP disintegrating tablet, , Disp: , Rfl:     oxyCODONE-acetaminophen (PERCOCET)  MG per tablet, Take 1 tablet by mouth every 6 hours as needed. , Disp: , Rfl:     prochlorperazine (COMPAZINE) 10 MG tablet, , Disp: , Rfl:     escitalopram (LEXAPRO) 20 MG tablet, TAKE 1 TABLET BY MOUTH EVERY DAY FOR DEPRESSION, Disp: , Rfl:     hydroCHLOROthiazide (HYDRODIURIL) 12.5 MG tablet, TAKE 1 TABLET BY MOUTH EVERY DAY HTN/EDEMA, Disp: , Rfl:     HYDROcodone-acetaminophen (NORCO) 5-325 MG per tablet, TAKE 1 TABLET BY MOUTH EVERY 6 HOURS (4 TIMES A DAY) AS NEEDED FOR PAIN, Disp: , Rfl:     losartan (COZAAR) 50 MG tablet, TAKE 1 TABLET BY MOUTH EVERYDAY FOR HYPERTENSION, Disp: , Rfl:       Patient is seen today in follow up with Heather Abbott NP. She received RT [facility] 4/18/2023-4/20/2023  and PTV 2500 rt sacrum 4/26/2023-5/1/2023 4fx/2000cGY and tolerated. She is feeling much better and following with Dr. Joelle Oden for medical oncology and having many side effects from treatment. She had her most recent MRI 6/16/2023. Here for results. FALLS RISK SCREENING ASSESSMENT    Instructions:  Assess the patient and enter the appropriate indicators that are present for fall risk identification.    Total the numbers entered and assign a fall risk score from Table 2.  Reassess patient at a minimum every 12 weeks or
within the brain. ORBITS: The visualized portion of the orbits demonstrate no acute abnormality. SINUSES: The visualized paranasal sinuses and mastoid air cells demonstrate  no acute abnormality. BONES/SOFT TISSUES: The bone marrow signal intensity appears normal. The soft  tissues demonstrate no acute abnormality. IMPRESSION:  1. Solitary metastatic lesion in the right frontal lobe measuring 4.6 x 4 mm, markedly improved compared to the prior study. 2. No evidence of new intracranial metastatic disease. 3. No evidence of acute intracranial abnormality. 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 Secondary cancer of the brain. Secondary cancer of the bone. Systemic therapy Alimta/Carbo/Pembrolizumab, cycle #1 was on 04/13/2023 and cycle # 4 was on 06/15/2023. Also on XGEVA. PET/CT scan (ordered by Medical Oncology) scheduled to be completed 07/03/2023. CNS metastases x 2. S/p SRS to right lateral frontal met on 04/18/2023. S/p SRS to right high posterior frontal met on 04/20/2023. MRI of the BRAIN in 8 weeks to be completed between the dates August 21-25, 2023 (ordered). Bone metastasis. S/p SRS to right sacrum met completed 05/01/2023. I discussed follow up plans with Manuela Suh. Radiation Oncology follow-up June 2023 after MRI brain completed. Manuela Suh is to follow up with other physicians/ APPs involved in their care as directed (including but not limited to Medical Oncology, Neurosurgery, Pulmonary or Primary Care). The patient was given our contact number in the event that if at any time they change their mind and would like to return to the clinic to see either myself or one of the Radiation Oncologists, they can simply call us and we would be happy to see them sooner. Thank you for involving us in the management of this extremely pleasant patient.         More than 15 min was in direct contact with pt

## 2023-07-03 ENCOUNTER — HOSPITAL ENCOUNTER (OUTPATIENT)
Dept: NUCLEAR MEDICINE | Age: 68
Discharge: HOME | End: 2023-07-03
Payer: COMMERCIAL

## 2023-07-03 DIAGNOSIS — C34.32 PRIMARY MALIGNANT NEOPLASM OF BRONCHUS OF LEFT LOWER LOBE (HCC): ICD-10-CM

## 2023-07-03 DIAGNOSIS — C79.51 METASTASIS TO BONE (HCC): ICD-10-CM

## 2023-07-03 PROCEDURE — A9552 F18 FDG: HCPCS | Performed by: RADIOLOGY

## 2023-07-03 PROCEDURE — 78815 PET IMAGE W/CT SKULL-THIGH: CPT

## 2023-07-03 PROCEDURE — 3430000000 HC RX DIAGNOSTIC RADIOPHARMACEUTICAL: Performed by: RADIOLOGY

## 2023-07-03 RX ORDER — FLUDEOXYGLUCOSE F 18 200 MCI/ML
10 INJECTION, SOLUTION INTRAVENOUS
Status: COMPLETED | OUTPATIENT
Start: 2023-07-03 | End: 2023-07-03

## 2023-07-03 RX ADMIN — FLUDEOXYGLUCOSE F 18 10 MILLICURIE: 200 INJECTION, SOLUTION INTRAVENOUS at 12:30

## 2023-07-07 ENCOUNTER — TELEPHONE (OUTPATIENT)
Dept: VASCULAR SURGERY | Age: 68
End: 2023-07-07

## 2023-07-07 ENCOUNTER — PREP FOR PROCEDURE (OUTPATIENT)
Dept: VASCULAR SURGERY | Age: 68
End: 2023-07-07

## 2023-07-07 NOTE — TELEPHONE ENCOUNTER
Request received from Dr. Trish Cohen for insertion of a venous port. Spoke with the pt, scheduled insertion with Dr. Thomas Ball 7/12/23.

## 2023-07-10 NOTE — PROGRESS NOTES
[address]   PRE-ADMISSION TESTING GENERAL INSTRUCTIONS  [facility] Phone Number: [phone]      GENERAL INSTRUCTIONS:    [x] Antibacterial Soap Shower Night before and/or AM of surgery. [] CHG Wipes instruction sheet and wipes given. [] Hibiclens shower the night before and the morning of surgery.   -Do not use Hibiclens on your face or head. [x] Do not wear makeup, lotions, powders, deodorant. [x] Nothing by mouth after midnight; including gum, candy, mints, or water. [x] You may brush your teeth, gargle, but do NOT swallow water. [x] No tobacco products, illegal drugs, or alcohol within 24 hours of your surgery. [x] Jewelry or valuables should not be brought to the hospital. All body and/or tongue piercing's must be removed prior to arriving to hospital. No contact lens or hair pins. [x] Arrange transportation with a responsible adult  to and from the hospital. Arrange for someone to be with you for the remainder of the day and for 24 hours after your procedure due to having had anesthesia. -Who will be your  for transportation?___Phoebe________         -Who will be staying with you for 24 hrs after your procedure?__________________  [x] Bring insurance card and photo ID.  [] Bring copy of living will or healthcare power of  papers to be placed in your electronic record. [] Urine Pregnancy test will be preformed the day of surgery. A specimen sample may be brought from home. [] Transfusion [facility]) Bracelet: Please bring with you to hospital, day of surgery. PARKING INSTRUCTIONS:     [x] ARRIVAL DATE & TIME:  7/12  @  1000  [x] Times are subject to change. We will contact you the business day before surgery if that were to occur. [x] Enter into the [employer]. Two people may accompany you. Masks are not required. [x] Parking Lot \"I\" is where you will park. It is located on the corner of [address] [place] [address] and [address] [facility].  The

## 2023-07-11 ENCOUNTER — ANESTHESIA EVENT (OUTPATIENT)
Dept: OPERATING ROOM | Age: 68
End: 2023-07-11
Payer: COMMERCIAL

## 2023-07-12 ENCOUNTER — HOSPITAL ENCOUNTER (OUTPATIENT)
Age: 68
Setting detail: OUTPATIENT SURGERY
Discharge: HOME | End: 2023-07-12
Attending: SURGERY | Admitting: SURGERY
Payer: COMMERCIAL

## 2023-07-12 ENCOUNTER — ANESTHESIA (OUTPATIENT)
Dept: OPERATING ROOM | Age: 68
End: 2023-07-12
Payer: COMMERCIAL

## 2023-07-12 ENCOUNTER — APPOINTMENT (OUTPATIENT)
Dept: GENERAL RADIOLOGY | Age: 68
End: 2023-07-12
Attending: SURGERY
Payer: COMMERCIAL

## 2023-07-12 VITALS
OXYGEN SATURATION: 97 % | SYSTOLIC BLOOD PRESSURE: 149 MMHG | HEIGHT: 66 IN | BODY MASS INDEX: 27.32 KG/M2 | WEIGHT: 170 LBS | RESPIRATION RATE: 16 BRPM | TEMPERATURE: 97.6 F | HEART RATE: 69 BPM | DIASTOLIC BLOOD PRESSURE: 76 MMHG

## 2023-07-12 LAB
ANION GAP SERPL CALCULATED.3IONS-SCNC: 12 MMOL/L (ref 7–16)
APTT BLD: 25 SEC (ref 24.5–35.1)
BUN SERPL-MCNC: 17 MG/DL (ref 6–23)
CALCIUM SERPL-MCNC: 8.7 MG/DL (ref 8.6–10.2)
CHLORIDE SERPL-SCNC: 102 MMOL/L (ref 98–107)
CO2 SERPL-SCNC: 26 MMOL/L (ref 22–29)
CREAT SERPL-MCNC: 0.7 MG/DL (ref 0.5–1)
ERYTHROCYTE [DISTWIDTH] IN BLOOD BY AUTOMATED COUNT: 19.8 FL (ref 11.5–15)
GLUCOSE SERPL-MCNC: 112 MG/DL (ref 74–99)
HCT VFR BLD AUTO: 28.5 % (ref 34–48)
HGB BLD-MCNC: 8.4 G/DL (ref 11.5–15.5)
INR BLD: 0.9
MCH RBC QN AUTO: 31.2 PG (ref 26–35)
MCHC RBC AUTO-ENTMCNC: 29.5 % (ref 32–34.5)
MCV RBC AUTO: 105.9 FL (ref 80–99.9)
PLATELET # BLD AUTO: 254 E9/L (ref 130–450)
PMV BLD AUTO: 9.4 FL (ref 7–12)
POTASSIUM SERPL-SCNC: 4 MMOL/L (ref 3.5–5)
PROTHROMBIN TIME: 10.4 SEC (ref 9.3–12.4)
RBC # BLD AUTO: 2.69 E12/L (ref 3.5–5.5)
SODIUM SERPL-SCNC: 140 MMOL/L (ref 132–146)
WBC # BLD: 3.5 E9/L (ref 4.5–11.5)

## 2023-07-12 PROCEDURE — 2500000003 HC RX 250 WO HCPCS: Performed by: SURGERY

## 2023-07-12 PROCEDURE — 7100000011 HC PHASE II RECOVERY - ADDTL 15 MIN: Performed by: SURGERY

## 2023-07-12 PROCEDURE — 6360000002 HC RX W HCPCS: Performed by: PHYSICIAN ASSISTANT

## 2023-07-12 PROCEDURE — 2709999900 HC NON-CHARGEABLE SUPPLY: Performed by: SURGERY

## 2023-07-12 PROCEDURE — 36415 COLL VENOUS BLD VENIPUNCTURE: CPT

## 2023-07-12 PROCEDURE — 3700000001 HC ADD 15 MINUTES (ANESTHESIA): Performed by: SURGERY

## 2023-07-12 PROCEDURE — 2580000003 HC RX 258: Performed by: SURGERY

## 2023-07-12 PROCEDURE — 7100000010 HC PHASE II RECOVERY - FIRST 15 MIN: Performed by: SURGERY

## 2023-07-12 PROCEDURE — 3600000013 HC SURGERY LEVEL 3 ADDTL 15MIN: Performed by: SURGERY

## 2023-07-12 PROCEDURE — A4217 STERILE WATER/SALINE, 500 ML: HCPCS | Performed by: SURGERY

## 2023-07-12 PROCEDURE — 77001 FLUOROGUIDE FOR VEIN DEVICE: CPT | Performed by: SURGERY

## 2023-07-12 PROCEDURE — 3700000000 HC ANESTHESIA ATTENDED CARE: Performed by: SURGERY

## 2023-07-12 PROCEDURE — 6360000002 HC RX W HCPCS: Performed by: SURGERY

## 2023-07-12 PROCEDURE — 85027 COMPLETE CBC AUTOMATED: CPT

## 2023-07-12 PROCEDURE — 85610 PROTHROMBIN TIME: CPT

## 2023-07-12 PROCEDURE — 71045 X-RAY EXAM CHEST 1 VIEW: CPT

## 2023-07-12 PROCEDURE — 3600000003 HC SURGERY LEVEL 3 BASE: Performed by: SURGERY

## 2023-07-12 PROCEDURE — 2580000003 HC RX 258: Performed by: PHYSICIAN ASSISTANT

## 2023-07-12 PROCEDURE — 80048 BASIC METABOLIC PNL TOTAL CA: CPT

## 2023-07-12 PROCEDURE — 85730 THROMBOPLASTIN TIME PARTIAL: CPT

## 2023-07-12 PROCEDURE — 6360000002 HC RX W HCPCS: Performed by: NURSE ANESTHETIST, CERTIFIED REGISTERED

## 2023-07-12 PROCEDURE — 36561 INSERT TUNNELED CV CATH: CPT | Performed by: SURGERY

## 2023-07-12 PROCEDURE — C1788 PORT, INDWELLING, IMP: HCPCS | Performed by: SURGERY

## 2023-07-12 DEVICE — PORT INFUS 8FR PWR INJ CT FOR VASC ACCS CATH: Type: IMPLANTABLE DEVICE | Site: CHEST | Status: FUNCTIONAL

## 2023-07-12 RX ORDER — MIDAZOLAM HYDROCHLORIDE 1 MG/ML
INJECTION INTRAMUSCULAR; INTRAVENOUS PRN
Status: DISCONTINUED | OUTPATIENT
Start: 2023-07-12 | End: 2023-07-12 | Stop reason: SDUPTHER

## 2023-07-12 RX ORDER — LIDOCAINE HYDROCHLORIDE 20 MG/ML
INJECTION, SOLUTION INTRAVENOUS PRN
Status: DISCONTINUED | OUTPATIENT
Start: 2023-07-12 | End: 2023-07-12 | Stop reason: SDUPTHER

## 2023-07-12 RX ORDER — PHENYLEPHRINE HCL IN 0.9% NACL 1 MG/10 ML
SYRINGE (ML) INTRAVENOUS PRN
Status: DISCONTINUED | OUTPATIENT
Start: 2023-07-12 | End: 2023-07-12 | Stop reason: SDUPTHER

## 2023-07-12 RX ORDER — DEXAMETHASONE SODIUM PHOSPHATE 10 MG/ML
INJECTION INTRAMUSCULAR; INTRAVENOUS PRN
Status: DISCONTINUED | OUTPATIENT
Start: 2023-07-12 | End: 2023-07-12 | Stop reason: SDUPTHER

## 2023-07-12 RX ORDER — PROPOFOL 10 MG/ML
INJECTION, EMULSION INTRAVENOUS CONTINUOUS PRN
Status: DISCONTINUED | OUTPATIENT
Start: 2023-07-12 | End: 2023-07-12 | Stop reason: SDUPTHER

## 2023-07-12 RX ORDER — SODIUM CHLORIDE 0.9 % (FLUSH) 0.9 %
5-40 SYRINGE (ML) INJECTION PRN
Status: DISCONTINUED | OUTPATIENT
Start: 2023-07-12 | End: 2023-07-12 | Stop reason: HOSPADM

## 2023-07-12 RX ORDER — FENTANYL CITRATE 50 UG/ML
INJECTION, SOLUTION INTRAMUSCULAR; INTRAVENOUS PRN
Status: DISCONTINUED | OUTPATIENT
Start: 2023-07-12 | End: 2023-07-12 | Stop reason: SDUPTHER

## 2023-07-12 RX ORDER — SODIUM CHLORIDE 9 MG/ML
INJECTION, SOLUTION INTRAVENOUS PRN
Status: DISCONTINUED | OUTPATIENT
Start: 2023-07-12 | End: 2023-07-12 | Stop reason: HOSPADM

## 2023-07-12 RX ORDER — SODIUM CHLORIDE 0.9 % (FLUSH) 0.9 %
5-40 SYRINGE (ML) INJECTION EVERY 12 HOURS SCHEDULED
Status: DISCONTINUED | OUTPATIENT
Start: 2023-07-12 | End: 2023-07-12 | Stop reason: HOSPADM

## 2023-07-12 RX ORDER — HEPARIN SODIUM 100 [USP'U]/ML
INJECTION, SOLUTION INTRAVENOUS PRN
Status: DISCONTINUED | OUTPATIENT
Start: 2023-07-12 | End: 2023-07-12 | Stop reason: ALTCHOICE

## 2023-07-12 RX ADMIN — MIDAZOLAM 1 MG: 1 INJECTION INTRAMUSCULAR; INTRAVENOUS at 12:34

## 2023-07-12 RX ADMIN — Medication 50 MCG: at 13:16

## 2023-07-12 RX ADMIN — DEXAMETHASONE SODIUM PHOSPHATE 10 MG: 10 INJECTION INTRAMUSCULAR; INTRAVENOUS at 12:47

## 2023-07-12 RX ADMIN — SODIUM CHLORIDE: 9 INJECTION, SOLUTION INTRAVENOUS at 12:27

## 2023-07-12 RX ADMIN — MIDAZOLAM 1 MG: 1 INJECTION INTRAMUSCULAR; INTRAVENOUS at 12:28

## 2023-07-12 RX ADMIN — CEFAZOLIN 2000 MG: 2 INJECTION, POWDER, FOR SOLUTION INTRAMUSCULAR; INTRAVENOUS at 12:38

## 2023-07-12 RX ADMIN — PROPOFOL 150 MCG/KG/MIN: 10 INJECTION, EMULSION INTRAVENOUS at 12:34

## 2023-07-12 RX ADMIN — FENTANYL CITRATE 50 MCG: 50 INJECTION, SOLUTION INTRAMUSCULAR; INTRAVENOUS at 12:34

## 2023-07-12 RX ADMIN — Medication 50 MCG: at 13:07

## 2023-07-12 RX ADMIN — LIDOCAINE HYDROCHLORIDE 40 MG: 20 INJECTION, SOLUTION INTRAVENOUS at 12:34

## 2023-07-12 ASSESSMENT — LIFESTYLE VARIABLES: SMOKING_STATUS: 1

## 2023-07-12 ASSESSMENT — PAIN - FUNCTIONAL ASSESSMENT: PAIN_FUNCTIONAL_ASSESSMENT: NONE - DENIES PAIN

## 2023-07-12 NOTE — OP NOTE
Operative Note      Patient: Erlin Sanchez  YOB: 1955  MRN: [id number]    Date of Procedure: 7/12/2023    Pre-Op Diagnosis Codes:     * Malignant neoplasm of lower lobe bronchus, left ([address]) [C34.32]     * Secondary malignant neoplasm of bone ([address]) [C79.51]    Post-Op Diagnosis: Same    Procedure :   02082  US guided access of right internal jugular vein  06962              Insertion of right internal jugular vein central venous catheter     23388  with subcutaneous reservoir under fluoroscopy    Surgeon(s):  Keely Joya MD    Assistant:   Surgical Assistant: Osito Dangelo  Resident: Ludin Arvizu DO; John Cortez DO    Anesthesia: Monitor Anesthesia Care    Estimated Blood Loss (mL): Minimal    Complications: None    Specimens:   * No specimens in log *    Implants:  Implant Name Type Inv. Item Serial No.  Lot No. LRB No. Used Action   PORT INFUS 8FR PWR INJ CT FOR VASC ACCS CATH - [id number]  PORT INFUS 8FR PWR INJ CT FOR VASC ACCS CATH  [id number] 1 Implanted     DESCRIPTION OF PROCEDURE: The patient was identified and the procedure was confirmed. The right neck and chest were prepped and draped in the usual sterile fashion. Next, 1% lidocaine mixed with 0.25% Marcaine was used for local anesthesia. The right internal jugular vein was identified under US, noted to be patent and was percutaneously entered under US guidance. A printer was not available to print an image. A guidewire was advanced into the superior vena cava under fluoroscopic guidance. A small incision was made around the wire. A second skin incision was made below the clavicle and a pocket was made in the subcutaneous tissue for the reservoir. The catheter was pulled through a subcutaneous tunnel from the inferior chest incision to the neck incision.  The introducer was passed over the wire then the catheter was passed through the introducer and the tip

## 2023-07-12 NOTE — H&P
Vascular Surgery History & Physical Exam      Chief Complaint: Cancer, Venous access    HISTORY OF PRESENT ILLNESS:                The patient is a 79 y.o. female who presents to the hospital for elective insertion of mediport. The patient denies any problems since the last office visit. IMPRESSION:  Lung Cancer with mets, Venous access    PLAN:   Insertion of mediport    I reviewed the procedure with the patient and family as available. I discussed the procedure, risks, benefits, complications, and alternatives of the procedure. They understand and consent. All questions were answered    ROS : All others Negative if blank [], Positive if [x]  General   [] Fevers   [] Chills   [] Weight Loss   Skin   [] Tissue Loss   Eyes   [] Wears Glasses/Contacts   [] Vision Changes   Respiratory    [] Shortness of breath   Cardiovascular   [] Chest Pain   [] Shortness of breath with exertion   Gastrointestinal   [] Abdominal Pain     Past Medical History:   Diagnosis Date    Hypertension     Malignant neoplasm of lower lobe of left lung ([address]) 02/14/2023    Adenocarcinoma     Secondary cancer of bone ([address]) 03/31/2023    Right sacral mass bone biopsy: metastatic adenocarcinoma. Secondary cancer of brain ([address]) 03/22/2023    Status post stereotactic radiosurgery 04/18/2023    SRS to right lateral frontal met, 2400 cGy/ 1 fraction. Status post stereotactic radiosurgery 04/20/2023    SRS to right high posterior frontal met, 2400 cGy/ 1 fraction. Status post stereotactic radiosurgery 04/26/2023    S/p SRS to right sacrum bone met, 2400 cGy/ 4 fractions completed 05/01/2023.     Thyroid disease      Past Surgical History:   Procedure Laterality Date    BRONCHOSCOPY N/A 3/7/2023    BRONCHOSCOPY ENDOBRONCHIAL ULTRASOUND, TBB, FNA,TBL, CYTOLOGY performed by Crystal Denton MD at [address] [place] [address] N/A 3/7/2023    BRONCHOSCOPY BRUSHINGS performed by Crystal Denton MD at [address] [place] [address]

## 2023-07-12 NOTE — ANESTHESIA POSTPROCEDURE EVALUATION
Department of Anesthesiology  Postprocedure Note    Patient: Candace Patterson  MRN: [id number]  [address] [facility] 1955  Date of evaluation: 7/12/2023      Procedure Summary     Date: 07/12/23 Room / Location: [facility] OR  / [facility]    Anesthesia Start:  Anesthesia Stop:     Procedure: PORT INSERTION Diagnosis:       Malignant neoplasm of lower lobe bronchus, left ([address])      Secondary malignant neoplasm of bone ([address])      (Malignant neoplasm of lower lobe bronchus, left ([address]) [C34.32])      (Secondary malignant neoplasm of bone ([address]) [C79.51])    Surgeons: Rock Jayden MD Responsible Provider:     Anesthesia Type: MAC ASA Status: 3          Anesthesia Type: No value filed.     Kayce Phase I: Kayce Score: 10    Kayce Phase II:        Anesthesia Post Evaluation    Patient location during evaluation: PACU  Patient participation: complete - patient participated  Level of consciousness: awake  Pain score: 3  Airway patency: patent  Nausea & Vomiting: no nausea and no vomiting  Complications: no  Cardiovascular status: blood pressure returned to baseline  Respiratory status: acceptable  Hydration status: euvolemic

## 2023-07-12 NOTE — ANESTHESIA POSTPROCEDURE EVALUATION
Department of Anesthesiology  Postprocedure Note    Patient: Carmen Hodges  MRN: [id number]  YOB: 1955  Date of evaluation: 7/12/2023      Procedure Summary     Date: 07/12/23 [facility] / [facility]    Anesthesia Start: 1227 Anesthesia Stop:     Procedure: PORT INSERTION (Right: Chest) Diagnosis:       Malignant neoplasm of lower lobe bronchus, left ([address])      Secondary malignant neoplasm of bone ([facility])      (Malignant neoplasm of lower lobe bronchus, left ([facility]) [C34.32])      (Secondary malignant neoplasm of bone ([address]) [C79.51])    Surgeons: Brooke Velázquez MD Responsible Provider: Alba Spencer MD    Anesthesia Type: MAC ASA Status: 3          Anesthesia Type: MAC    Kayce Phase I: Kayce Score: 10    Kayce Phase II:        Anesthesia Post Evaluation    Patient location during evaluation: PACU  Patient participation: complete - patient participated  Level of consciousness: awake  Pain score: 0  Airway patency: patent  Nausea & Vomiting: no nausea and no vomiting  Complications: no  Cardiovascular status: hemodynamically stable  Respiratory status: acceptable  Hydration status: euvolemic

## 2023-08-29 ENCOUNTER — APPOINTMENT (OUTPATIENT)
Dept: RADIATION ONCOLOGY | Age: 68
End: 2023-08-29
Payer: COMMERCIAL

## 2023-08-30 ENCOUNTER — HOSPITAL ENCOUNTER (OUTPATIENT)
Dept: MRI IMAGING | Age: 68
Discharge: HOME | End: 2023-09-01
Payer: COMMERCIAL

## 2023-08-30 DIAGNOSIS — Z85.841 ENCOUNTER FOR FOLLOW-UP SURVEILLANCE OF BRAIN CANCER: ICD-10-CM

## 2023-08-30 DIAGNOSIS — Z92.3 STATUS POST STEREOTACTIC RADIOSURGERY: ICD-10-CM

## 2023-08-30 DIAGNOSIS — Z08 ENCOUNTER FOR FOLLOW-UP EXAMINATION AFTER COMPLETED TREATMENT FOR MALIGNANT NEOPLASM: ICD-10-CM

## 2023-08-30 DIAGNOSIS — C79.31 SECONDARY CANCER OF BRAIN (HCC): ICD-10-CM

## 2023-08-30 DIAGNOSIS — Z08 ENCOUNTER FOR FOLLOW-UP SURVEILLANCE OF BRAIN CANCER: ICD-10-CM

## 2023-08-30 PROCEDURE — 6360000002 HC RX W HCPCS: Performed by: NURSE PRACTITIONER

## 2023-08-30 PROCEDURE — 6360000004 HC RX CONTRAST MEDICATION: Performed by: RADIOLOGY

## 2023-08-30 PROCEDURE — A9577 INJ MULTIHANCE: HCPCS | Performed by: RADIOLOGY

## 2023-08-30 PROCEDURE — 70553 MRI BRAIN STEM W/O & W/DYE: CPT

## 2023-08-30 RX ORDER — HEPARIN 100 UNIT/ML
500 SYRINGE INTRAVENOUS ONCE
Status: COMPLETED | OUTPATIENT
Start: 2023-08-30 | End: 2023-08-30

## 2023-08-30 RX ADMIN — GADOBENATE DIMEGLUMINE 32 ML: 529 INJECTION, SOLUTION INTRAVENOUS at 15:56

## 2023-08-30 RX ADMIN — Medication 500 UNITS: at 15:59

## 2023-08-31 ENCOUNTER — HOSPITAL ENCOUNTER (OUTPATIENT)
Dept: RADIATION ONCOLOGY | Age: 68
Discharge: HOME | End: 2023-08-31
Payer: COMMERCIAL

## 2023-08-31 VITALS
BODY MASS INDEX: 26.65 KG/M2 | SYSTOLIC BLOOD PRESSURE: 118 MMHG | WEIGHT: 165.13 LBS | HEART RATE: 80 BPM | RESPIRATION RATE: 20 BRPM | DIASTOLIC BLOOD PRESSURE: 74 MMHG | TEMPERATURE: 97.9 F

## 2023-08-31 DIAGNOSIS — Z08 ENCOUNTER FOR FOLLOW-UP SURVEILLANCE OF BRAIN CANCER: Primary | ICD-10-CM

## 2023-08-31 DIAGNOSIS — C79.31 SECONDARY CANCER OF BRAIN (HCC): Primary | ICD-10-CM

## 2023-08-31 DIAGNOSIS — Z85.841 ENCOUNTER FOR FOLLOW-UP SURVEILLANCE OF BRAIN CANCER: Primary | ICD-10-CM

## 2023-08-31 PROCEDURE — 99212 OFFICE O/P EST SF 10 MIN: CPT

## 2023-08-31 RX ORDER — FOLIC ACID 1 MG/1
1 TABLET ORAL DAILY
COMMUNITY

## 2023-08-31 RX ORDER — FUROSEMIDE 20 MG/1
20 TABLET ORAL DAILY
COMMUNITY

## 2023-09-07 RX ORDER — DARBEPOETIN ALFA 200 UG/.4ML
200 INJECTION, SOLUTION INTRAVENOUS; SUBCUTANEOUS
COMMUNITY
Start: 2023-07-06

## 2023-09-07 RX ORDER — BENZONATATE 200 MG/1
200 CAPSULE ORAL EVERY 12 HOURS PRN
COMMUNITY

## 2023-10-20 ENCOUNTER — HOSPITAL ENCOUNTER (OUTPATIENT)
Dept: CT IMAGING | Age: 68
End: 2023-10-20
Attending: INTERNAL MEDICINE
Payer: COMMERCIAL

## 2023-10-20 DIAGNOSIS — C79.52 SECONDARY MALIGNANT NEOPLASM OF BONE AND BONE MARROW (HCC): ICD-10-CM

## 2023-10-20 DIAGNOSIS — C34.32 PRIMARY MALIGNANT NEOPLASM OF BRONCHUS OF LEFT LOWER LOBE (HCC): ICD-10-CM

## 2023-10-20 DIAGNOSIS — C79.51 SECONDARY MALIGNANT NEOPLASM OF BONE AND BONE MARROW (HCC): ICD-10-CM

## 2023-10-20 PROCEDURE — 6360000004 HC RX CONTRAST MEDICATION: Performed by: RADIOLOGY

## 2023-10-20 PROCEDURE — 71260 CT THORAX DX C+: CPT

## 2023-10-20 PROCEDURE — 6360000002 HC RX W HCPCS: Performed by: INTERNAL MEDICINE

## 2023-10-20 PROCEDURE — 74177 CT ABD & PELVIS W/CONTRAST: CPT

## 2023-10-20 RX ORDER — HEPARIN 100 UNIT/ML
500 SYRINGE INTRAVENOUS ONCE
Status: COMPLETED | OUTPATIENT
Start: 2023-10-20 | End: 2023-10-20

## 2023-10-20 RX ADMIN — IOPAMIDOL 18 ML: 755 INJECTION, SOLUTION INTRAVENOUS at 15:35

## 2023-10-20 RX ADMIN — IOPAMIDOL 75 ML: 755 INJECTION, SOLUTION INTRAVENOUS at 15:35

## 2023-10-20 RX ADMIN — HEPARIN 500 UNITS: 100 SYRINGE at 15:47

## 2023-10-20 NOTE — PROCEDURES
Heparin 500 units given per order. Port deaccessed without any complications or bleeding. Dressing applied to site and patient ambulated to the lobby.

## 2023-10-20 NOTE — PROGRESS NOTES
This rn accessed pt's port. Line draws and flushes with ease. CHG dressing placed over insertion site. Site and dressing are dry clean and intact. Pt tolerated well and taken back to CT waiting area.

## 2023-11-30 ENCOUNTER — TELEPHONE (OUTPATIENT)
Dept: RADIATION ONCOLOGY | Age: 68
End: 2023-11-30

## 2023-11-30 NOTE — TELEPHONE ENCOUNTER
Rad onc nurse called blood and cancer center and spoke to the  regarding if patient had any recent lab work for her upcoming MRI brain on 12/4/23 at [facility]. Patient had lab work on 11/16/23. [facility] is facing over the results to radiation oncology.

## 2023-12-04 ENCOUNTER — HOSPITAL ENCOUNTER (OUTPATIENT)
Dept: MRI IMAGING | Age: 68
Discharge: HOME | End: 2023-12-06
Payer: COMMERCIAL

## 2023-12-04 DIAGNOSIS — Z08 ENCOUNTER FOR FOLLOW-UP SURVEILLANCE OF BRAIN CANCER: ICD-10-CM

## 2023-12-04 DIAGNOSIS — Z85.841 ENCOUNTER FOR FOLLOW-UP SURVEILLANCE OF BRAIN CANCER: ICD-10-CM

## 2023-12-04 PROCEDURE — 70553 MRI BRAIN STEM W/O & W/DYE: CPT

## 2023-12-04 PROCEDURE — 6360000004 HC RX CONTRAST MEDICATION: Performed by: RADIOLOGY

## 2023-12-04 PROCEDURE — A9579 GAD-BASE MR CONTRAST NOS,1ML: HCPCS | Performed by: RADIOLOGY

## 2023-12-04 RX ORDER — HEPARIN 100 UNIT/ML
500 SYRINGE INTRAVENOUS ONCE
Status: DISCONTINUED | OUTPATIENT
Start: 2023-12-04 | End: 2023-12-07 | Stop reason: HOSPADM

## 2023-12-04 RX ADMIN — GADOTERIDOL 30 ML: 279.3 INJECTION, SOLUTION INTRAVENOUS at 15:58

## 2023-12-11 ENCOUNTER — HOSPITAL ENCOUNTER (OUTPATIENT)
Dept: RADIATION ONCOLOGY | Age: 68
End: 2023-12-11
Payer: COMMERCIAL

## 2023-12-12 ENCOUNTER — APPOINTMENT (OUTPATIENT)
Dept: RADIATION ONCOLOGY | Age: 68
End: 2023-12-12
Payer: COMMERCIAL

## 2023-12-14 ENCOUNTER — APPOINTMENT (OUTPATIENT)
Dept: RADIATION ONCOLOGY | Age: 68
End: 2023-12-14
Payer: COMMERCIAL

## 2023-12-21 ENCOUNTER — APPOINTMENT (OUTPATIENT)
Dept: RADIATION ONCOLOGY | Age: 68
End: 2023-12-21
Payer: COMMERCIAL

## 2023-12-28 LAB
ALBUMIN SERPL-MCNC: 3.6 G/DL (ref 3.5–5.2)
ALP SERPL-CCNC: 77 U/L (ref 35–104)
ALT SERPL-CCNC: 13 U/L (ref 0–32)
ANION GAP SERPL CALCULATED.3IONS-SCNC: 16 MMOL/L (ref 7–16)
AST SERPL-CCNC: 27 U/L (ref 0–31)
BILIRUB SERPL-MCNC: 0.4 MG/DL (ref 0–1.2)
BUN SERPL-MCNC: 17 MG/DL (ref 6–23)
CALCIUM SERPL-MCNC: 9.3 MG/DL (ref 8.6–10.2)
CHLORIDE SERPL-SCNC: 97 MMOL/L (ref 98–107)
CHOLEST SERPL-MCNC: 260 MG/DL
CO2 SERPL-SCNC: 23 MMOL/L (ref 22–29)
CREAT SERPL-MCNC: 0.9 MG/DL (ref 0.5–1)
FERRITIN SERPL-MCNC: 207 NG/ML
GFR SERPL CREATININE-BSD FRML MDRD: >60 ML/MIN/1.73M2
GLUCOSE SERPL-MCNC: 143 MG/DL (ref 74–99)
IRON SATN MFR SERPL: 20 % (ref 15–50)
IRON SERPL-MCNC: 62 UG/DL (ref 37–145)
POTASSIUM SERPL-SCNC: 4.6 MMOL/L (ref 3.5–5)
PROT SERPL-MCNC: 6.8 G/DL (ref 6.4–8.3)
SODIUM SERPL-SCNC: 136 MMOL/L (ref 132–146)
TIBC SERPL-MCNC: 306 UG/DL (ref 250–450)
TRIGL SERPL-MCNC: 183 MG/DL

## 2024-01-18 LAB
BASOPHILS # BLD: 0.08 K/UL (ref 0–0.2)
BASOPHILS NFR BLD: 1 % (ref 0–2)
EOSINOPHIL # BLD: 0.04 K/UL (ref 0.05–0.5)
EOSINOPHILS RELATIVE PERCENT: 0 % (ref 0–6)
ERYTHROCYTE [DISTWIDTH] IN BLOOD BY AUTOMATED COUNT: 14.8 % (ref 11.5–15)
HCT VFR BLD AUTO: 35.2 % (ref 34–48)
HGB BLD-MCNC: 11 G/DL (ref 11.5–15.5)
IMM GRANULOCYTES # BLD AUTO: 0.03 K/UL (ref 0–0.58)
IMM GRANULOCYTES NFR BLD: 0 % (ref 0–5)
LYMPHOCYTES NFR BLD: 1.28 K/UL (ref 1.5–4)
LYMPHOCYTES RELATIVE PERCENT: 14 % (ref 20–42)
MCH RBC QN AUTO: 34.5 PG (ref 26–35)
MCHC RBC AUTO-ENTMCNC: 31.3 G/DL (ref 32–34.5)
MCV RBC AUTO: 110.3 FL (ref 80–99.9)
MONOCYTES NFR BLD: 0.56 K/UL (ref 0.1–0.95)
MONOCYTES NFR BLD: 6 % (ref 2–12)
NEUTROPHILS NFR BLD: 79 % (ref 43–80)
NEUTS SEG NFR BLD: 7.43 K/UL (ref 1.8–7.3)
PLATELET # BLD AUTO: 365 K/UL (ref 130–450)
PMV BLD AUTO: 10.4 FL (ref 7–12)
RBC # BLD AUTO: 3.19 M/UL (ref 3.5–5.5)
RBC # BLD: ABNORMAL 10*6/UL
WBC OTHER # BLD: 9.4 K/UL (ref 4.5–11.5)

## 2024-03-07 ENCOUNTER — HOSPITAL ENCOUNTER (OUTPATIENT)
Dept: MRI IMAGING | Age: 69
Discharge: HOME | End: 2024-03-09
Payer: COMMERCIAL

## 2024-03-07 DIAGNOSIS — Z08 ENCOUNTER FOR FOLLOW-UP SURVEILLANCE OF BRAIN CANCER: ICD-10-CM

## 2024-03-07 DIAGNOSIS — Z85.841 ENCOUNTER FOR FOLLOW-UP SURVEILLANCE OF BRAIN CANCER: ICD-10-CM

## 2024-03-07 PROCEDURE — 6360000004 HC RX CONTRAST MEDICATION: Performed by: RADIOLOGY

## 2024-03-07 PROCEDURE — A9577 INJ MULTIHANCE: HCPCS | Performed by: RADIOLOGY

## 2024-03-07 PROCEDURE — 70553 MRI BRAIN STEM W/O & W/DYE: CPT

## 2024-03-07 RX ADMIN — GADOBENATE DIMEGLUMINE 32 ML: 529 INJECTION, SOLUTION INTRAVENOUS at 16:35

## 2024-03-12 ENCOUNTER — HOSPITAL ENCOUNTER (OUTPATIENT)
Dept: RADIATION ONCOLOGY | Age: 69
Discharge: HOME | End: 2024-03-12
Payer: COMMERCIAL

## 2024-03-12 ENCOUNTER — HOSPITAL ENCOUNTER (OUTPATIENT)
Dept: CT IMAGING | Age: 69
Discharge: HOME | End: 2024-03-14
Attending: INTERNAL MEDICINE
Payer: COMMERCIAL

## 2024-03-12 VITALS
OXYGEN SATURATION: 97 % | WEIGHT: 173.2 LBS | BODY MASS INDEX: 27.96 KG/M2 | DIASTOLIC BLOOD PRESSURE: 76 MMHG | HEART RATE: 77 BPM | SYSTOLIC BLOOD PRESSURE: 164 MMHG | RESPIRATION RATE: 18 BRPM | TEMPERATURE: 97.1 F

## 2024-03-12 DIAGNOSIS — C79.51 METASTASIS TO BONE (HCC): ICD-10-CM

## 2024-03-12 DIAGNOSIS — Z79.51 LONG TERM CURRENT USE OF INHALED STEROID: ICD-10-CM

## 2024-03-12 DIAGNOSIS — C79.31 SECONDARY CANCER OF BRAIN (HCC): Primary | ICD-10-CM

## 2024-03-12 DIAGNOSIS — Z71.2 ENCOUNTER TO DISCUSS TEST RESULTS: ICD-10-CM

## 2024-03-12 DIAGNOSIS — Z71.89 ENCOUNTER TO DISCUSS TREATMENT OPTIONS: ICD-10-CM

## 2024-03-12 DIAGNOSIS — Z92.3 STATUS POST STEREOTACTIC RADIOSURGERY: ICD-10-CM

## 2024-03-12 DIAGNOSIS — Z01.812 BLOOD TESTS PRIOR TO TREATMENT OR PROCEDURE: Primary | ICD-10-CM

## 2024-03-12 DIAGNOSIS — C34.32 PRIMARY MALIGNANT NEOPLASM OF BRONCHUS OF LEFT LOWER LOBE (HCC): ICD-10-CM

## 2024-03-12 PROCEDURE — 99213 OFFICE O/P EST LOW 20 MIN: CPT

## 2024-03-12 PROCEDURE — 6360000004 HC RX CONTRAST MEDICATION: Performed by: RADIOLOGY

## 2024-03-12 PROCEDURE — 74177 CT ABD & PELVIS W/CONTRAST: CPT

## 2024-03-12 PROCEDURE — 71260 CT THORAX DX C+: CPT

## 2024-03-12 PROCEDURE — 99214 OFFICE O/P EST MOD 30 MIN: CPT | Performed by: NURSE PRACTITIONER

## 2024-03-12 RX ORDER — SODIUM CHLORIDE 0.9 % (FLUSH) 0.9 %
10 SYRINGE (ML) INJECTION
Status: ACTIVE | OUTPATIENT
Start: 2024-03-12 | End: 2024-03-13

## 2024-03-12 RX ADMIN — IOPAMIDOL 75 ML: 755 INJECTION, SOLUTION INTRAVENOUS at 15:55

## 2024-03-12 RX ADMIN — IOPAMIDOL 18 ML: 755 INJECTION, SOLUTION INTRAVENOUS at 15:55

## 2024-03-12 NOTE — PROGRESS NOTES
Cammy GUERRERO John  3/12/2024  1:19 PM      Vitals:    03/12/24 1317   BP: (!) 164/76   Pulse: 77   Resp: 18   Temp: 97.1 °F (36.2 °C)   SpO2: 97%    :    Wt Readings from Last 3 Encounters:   03/12/24 78.6 kg (173 lb 3.2 oz)   12/20/23 80.5 kg (177 lb 8 oz)   08/31/23 74.9 kg (165 lb 2 oz)                Current Outpatient Medications:     benzonatate (TESSALON) 200 MG capsule, Take 1 capsule by mouth every 12 hours as needed for Cough, Disp: , Rfl:     ARANESP, ALBUMIN FREE, 200 MCG/0.4ML SOSY injection, Inject 0.4 mLs into the skin every 30 days, Disp: , Rfl:     POTASSIUM BICARBONATE PO, Take 20 mEq by mouth daily, Disp: , Rfl:     folic acid (FOLVITE) 1 MG tablet, Take 1 tablet by mouth daily, Disp: , Rfl:     furosemide (LASIX) 20 MG tablet, Take 1 tablet by mouth daily, Disp: , Rfl:     primidone (MYSOLINE) 50 MG tablet, Take 1 tablet by mouth daily, Disp: , Rfl:     predniSONE (DELTASONE) 10 MG tablet, Take 1 tablet by mouth daily with food, Disp: , Rfl:     fentaNYL (DURAGESIC) 25 MCG/HR, APPLY 1 PATCH TOPICALL EVERY 72 HOURS, Disp: , Rfl:     gabapentin (NEURONTIN) 300 MG capsule, Take 1 capsule by mouth nightly., Disp: , Rfl:     ondansetron (ZOFRAN-ODT) 8 MG TBDP disintegrating tablet, , Disp: , Rfl:     oxyCODONE-acetaminophen (PERCOCET)  MG per tablet, Take 1 tablet by mouth every 6 hours as needed., Disp: , Rfl:     prochlorperazine (COMPAZINE) 10 MG tablet, , Disp: , Rfl:     escitalopram (LEXAPRO) 20 MG tablet, TAKE 1 TABLET BY MOUTH EVERY DAY FOR DEPRESSION, Disp: , Rfl:     losartan (COZAAR) 50 MG tablet, TAKE 1 TABLET BY MOUTH EVERYDAY FOR HYPERTENSION, Disp: , Rfl:       Patient is seen today in follow up with Nancy NP. RT PTV 2000 rt sacrum 4fx/2000cGY and  SRS x2 4/20/2023 1fx/2400cGY and tolerated. She is following with Dr. Moon for medical oncology. She had her most recent scan on 3/7/2024 MRI of brain and CT C/A/P 3/12/24. All questions answered from a nursing perspective.

## 2024-03-12 NOTE — PROGRESS NOTES
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 CNS and bone metastases.     03/22/2023 MRI BRAIN:   Solitary 10 metastasis in the high posterior right frontal lobe.   Moderate chronic microvascular ischemic changes.     03/27/2023 PET/CT SCAN:  Metabolically active mass in the posterior left hilar region extending into the hilum.  Metabolically active skeletal metastasis in the right aspect of the sacrum.  Metabolically active right metastasis corresponding to the finding on recent brain MRI.     03/30/2023 MRI BRAIN (SRS planning):  High posterior right frontal lobe 10 mm metastasis with mild vasogenic edema.   Right lateral frontal 2-3 mm metastasis.      04/13/2023: Cycle # 1 Carbo/ Alimta/ Keytruda per Medical Oncology. The patient also started on Xgeva per Medical Oncology.     S/p stereotactic radiosurgery (SRS):  Right lateral frontal met, SRS 2400 cGy/1 fraction on 04/18/2023.  Right high posterior frontal met, SRS 2400 cGy/ 1 fraction on 04/20/2023.  Right sacrum bone met, SRS 2400 cGy/ 4 fraction completed 05/01/2023.      05/17/2023: Radiation Oncology post SRS visit. The patient presented tearful complained of increased fatigue, myalgias/ arthralgias along with left sided swelling: mild left wrist, mild to moderate left knee swelling, S/p fall on 05/03/2023- impact to left knee and left upper arm swelling S/p injection XGEVA on 05/11/2023.     Medical Oncology notes reviewed. The patient started on Mobic for knee pain/ swelling. X-rays to left hand + wrist along with left knee + ankle completed. No acute fractures. Soft tissue swelling left hand- mild. Suprapatellar soft tissue swelling- left knee. The patient

## 2024-03-16 ENCOUNTER — HOSPITAL ENCOUNTER (OUTPATIENT)
Age: 69
Discharge: HOME | End: 2024-03-16
Payer: COMMERCIAL

## 2024-03-16 DIAGNOSIS — Z01.812 BLOOD TESTS PRIOR TO TREATMENT OR PROCEDURE: ICD-10-CM

## 2024-03-16 LAB
BUN SERPL-MCNC: 13 MG/DL (ref 6–23)
CREAT SERPL-MCNC: 0.9 MG/DL (ref 0.5–1)
GFR SERPL CREATININE-BSD FRML MDRD: >60 ML/MIN/1.73M2

## 2024-03-16 PROCEDURE — 84520 ASSAY OF UREA NITROGEN: CPT

## 2024-03-16 PROCEDURE — 82565 ASSAY OF CREATININE: CPT

## 2024-03-16 PROCEDURE — 36415 COLL VENOUS BLD VENIPUNCTURE: CPT

## 2024-03-19 ENCOUNTER — HOSPITAL ENCOUNTER (OUTPATIENT)
Dept: RADIATION ONCOLOGY | Age: 69
Discharge: HOME | End: 2024-03-19
Payer: COMMERCIAL

## 2024-03-19 PROCEDURE — 77334 RADIATION TREATMENT AID(S): CPT | Performed by: SPECIALIST

## 2024-03-19 PROCEDURE — 6360000004 HC RX CONTRAST MEDICATION: Performed by: SPECIALIST

## 2024-03-19 RX ADMIN — IOPAMIDOL 100 ML: 755 INJECTION, SOLUTION INTRAVENOUS at 09:42

## 2024-03-29 ENCOUNTER — HOSPITAL ENCOUNTER (OUTPATIENT)
Dept: RADIATION ONCOLOGY | Age: 69
Discharge: HOME | End: 2024-03-29
Payer: COMMERCIAL

## 2024-03-29 PROCEDURE — NBSRV NON-BILLABLE SERVICE: Performed by: RADIOLOGY

## 2024-03-29 PROCEDURE — 77336 RADIATION PHYSICS CONSULT: CPT | Performed by: SPECIALIST

## 2024-03-29 NOTE — PROGRESS NOTES
Radiation Oncology        Ms.Elizabeth YOLANDA Lopez underwent fractionated external beam radiation therapy using a SRS technique.    I was personally present for the treatment planning (+/- 4D CT, W/WO Ab compression) imaging and pt setup to ensure appropriate immobilization to meet current ROCK standards. After a detailed review of the treatment plan and appropriate physics QA / oversight this pt was scheduled and underwent hypo fractionated treatment as noted per the D/I in [place]q.  Typical fractionation schemes include but are not limited to 2400 Gy in 1 fraction.  The NCCN guidelines and pt workup including a detailed discussion of the risks, benefits and alternative were discussed previously [RTOG dose constraints met per plan as applicable- see Mos[place]q].  The pt verbalized understanding for the risks of this procedure today prior to Tx.   Today, after a dual identification time out (including a brief plan overview )- I personally reviewed the complex multifaceted immobilization apparatus W/WO compression +/- Synergy (PRN), patient position, and 4D imaging (if applicable) prior to the treatment delivery to ensure accuracy.  The SRS team, including myself, were all present for the set up CT scan and delivery of the fraction (the latter on a PRN basis).  The patient successfully completed the procedure today in stable condition; this procedure was well tolerated today.         Cammy Lopez has now received 2400 cGy in 1/1 fractions directed to the frontal left.        Freddy Maloney III, MD MS [facility]  Radiation Oncology  Cell: [phone]    [facility]:  [phone]   FAX: [phone]  [facility]:  [phone]   FAX:    [phone]  [facility]:  [phone]   FAX:  [phone]

## 2024-03-29 NOTE — PROGRESS NOTES
Radiation Oncology          Ms.Elizabeth YOLANDA Lopez underwent fractionated external beam radiation therapy using a SRS technique.    I was personally present for the treatment planning (+/- 4D CT, W/WO Ab compression) imaging and pt setup to ensure appropriate immobilization to meet current ROCK standards. After a detailed review of the treatment plan and appropriate physics QA / oversight this pt was scheduled and underwent hypo fractionated treatment as noted per the D/I in [place].  Typical fractionation schemes include but are not limited to 2400 Gy in 1 fractions.  The NCCN guidelines and pt workup including a detailed discussion of the risks, benefits and alternative were discussed previously [RTOG dose constraints met per plan as applicable- see Mosaiq].  The pt verbalized understanding for the risks of this procedure today prior to Tx.   Today, after a dual identification time out (including a brief plan overview )- I personally reviewed the complex multifaceted immobilization apparatus W/WO compression +/- Synergy (PRN), patient position, and 4D imaging (if applicable) prior to the treatment delivery to ensure accuracy.  The SRS team, including myself, were all present for the set up CT scan and delivery of the fraction (the latter on a PRN basis).  The patient successfully completed the procedure today in stable condition; this procedure was well tolerated today.         Cammy Lopez has now received 2400 cGy in 1/1 fractions directed to the parietal para sag left.        Freddy Maloney III, MD MS [facility]  Radiation Oncology  Cell: [phone]    [facility]:  [phone]   FAX: [phone]  [facility]:  [phone]   FAX:    [phone]  [facility]:  [phone]   FAX:  [phone]

## 2024-04-25 ENCOUNTER — HOSPITAL ENCOUNTER (OUTPATIENT)
Dept: RADIATION ONCOLOGY | Age: 69
Discharge: HOME | End: 2024-04-25

## 2024-04-25 VITALS
WEIGHT: 156 LBS | RESPIRATION RATE: 18 BRPM | BODY MASS INDEX: 25.18 KG/M2 | DIASTOLIC BLOOD PRESSURE: 63 MMHG | HEART RATE: 74 BPM | TEMPERATURE: 97.6 F | OXYGEN SATURATION: 18 % | SYSTOLIC BLOOD PRESSURE: 124 MMHG

## 2024-04-25 DIAGNOSIS — C79.31 SECONDARY CANCER OF BRAIN (HCC): Primary | ICD-10-CM

## 2024-04-25 PROCEDURE — NBSRV NON-BILLABLE SERVICE: Performed by: SPECIALIST

## 2024-04-25 NOTE — PROGRESS NOTES
SCREENING ASSESSMENT    Instructions:  Assess the patient and enter the appropriate indicators that are present for fall risk identification.   Total the numbers entered and assign a fall risk score from Table 2.  Reassess patient at a minimum every 12 weeks or with status change.    Assessment   Date  4/25/2024     1.  Mental Ability: confusion/cognitively impaired No - 0       2.  Elimination Issues: incontinence, frequency No - 0       3.  Ambulatory: use of assistive devices (walker, cane, off-loading devices), attached to equipment (IV pole, oxygen) No - 0     4.  Sensory Limitations: dizziness, vertigo, impaired vision No - 0       5.  Age 65 years or greater - 1       6.  Medication: diuretics, strong analgesics, hypnotics, sedatives, antihypertensive agents   Yes - 3   7.  Falls:  recent history of falls within the last 3 months (not to include slipping or tripping)   No - 0   TOTAL 4    If score of 4 or greater was education given? Yes       TABLE 2   Risk Score Risk Level Plan of Care   0-3 Little or  No Risk 1.  Provide assistance as indicated for ambulation activities  2.  Reorient confused/cognitively impaired patient  3.  Call-light/bell within patient's reach  4.  Chair/bed in low position, stretcher/bed with siderails up except when performing patient care activities  5.  Educate patient/family/caregiver on falls prevention  6.  Reassess in 12 weeks or with any noted change in patient condition which places them at a risk for a fall   4-6 Moderate Risk 1.  Provide assistance as indicated for ambulation activities  2.  Reorient confused/cognitively impaired patient  3.  Call-light/bell within patient's reach  4.  Chair/bed in low position, stretcher/bed with siderails up except when performing patient care activities  5.  Educate patient/family/caregiver on falls prevention  6.  Falls risk precaution (Yellow sticker Level II) placed on patient chart   7 or   Higher High Risk 1.  Place patient in 
  Extremities:  Bilateral lower extremity edema is noted. No ulcerations, tenderness, varicosities or erythema. Coordination appears adequate.  Sensory function appears intact.    Skin:  Warm and dry.  Examination of color, turgor and pigmentation was relatively normal. No bruises or skin rashes.   Neurological/Psychiatric: General behavior, level of consciousness, thought content is normal. The patient's emotional status is normal.  Cranial nerves II-XII are grossly intact.  There is no dysdiadochokinesia.  Tandem testing is intact.  Romberg is negative.    TUMOR MARKERS: No results found for: \"PSA\", \"CEA\", \"\", \"VU2102\", \"\"    ASSESSMENT/PLAN:    I had a wonderful follow-up visit with Cammy today.  She appears to be doing quite nicely post radiosurgery.  She reports only occasional lightheadedness which is positionally related and fleeting lasting less than a minute.  She denies headaches or focal neurologic symptoms.  On exam today I find no focal neurosymptoms.  I have requested neuro surveillance imaging with continued close monitoring.    I asked Cammy Lopez  to contact us at any time for any questions or concerns.    Thank you for the opportunity to participate in multidisciplinary management of this remarkable and pleasant patient.     Salas Liu MD, RADHA, FACRO, FACR, FCTSI, FASTRO    Department of Radiation Oncology  [facility] ([facility]) [facility] Center: [phone] (FAX: [phone])  [facility] ([facility]: [phone] (FAX: [phone])  [facility]:  [phone] (FAX:  [phone])    NOTE: This report was transcribed using voice recognition software. Every effort was made to ensure accuracy; however, inadvertent computerized transcription errors may be present.

## 2024-05-28 ENCOUNTER — HOSPITAL ENCOUNTER (OUTPATIENT)
Dept: MRI IMAGING | Age: 69
Discharge: HOME | End: 2024-05-30
Attending: SPECIALIST
Payer: COMMERCIAL

## 2024-05-28 DIAGNOSIS — C79.31 SECONDARY CANCER OF BRAIN (HCC): ICD-10-CM

## 2024-05-28 PROCEDURE — 70553 MRI BRAIN STEM W/O & W/DYE: CPT

## 2024-05-28 PROCEDURE — 6360000004 HC RX CONTRAST MEDICATION: Performed by: RADIOLOGY

## 2024-05-28 PROCEDURE — A9577 INJ MULTIHANCE: HCPCS | Performed by: RADIOLOGY

## 2024-05-28 RX ADMIN — GADOBENATE DIMEGLUMINE 28 ML: 529 INJECTION, SOLUTION INTRAVENOUS at 15:22

## 2024-05-29 ENCOUNTER — HOSPITAL ENCOUNTER (OUTPATIENT)
Dept: RADIATION ONCOLOGY | Age: 69
Discharge: HOME | End: 2024-05-29

## 2024-05-29 VITALS
OXYGEN SATURATION: 97 % | RESPIRATION RATE: 18 BRPM | HEART RATE: 76 BPM | SYSTOLIC BLOOD PRESSURE: 142 MMHG | WEIGHT: 156.4 LBS | TEMPERATURE: 97.6 F | BODY MASS INDEX: 25.24 KG/M2 | DIASTOLIC BLOOD PRESSURE: 76 MMHG

## 2024-05-29 DIAGNOSIS — Z08 ENCOUNTER FOR FOLLOW-UP SURVEILLANCE OF BRAIN CANCER: Primary | ICD-10-CM

## 2024-05-29 DIAGNOSIS — C79.31 SECONDARY CANCER OF BRAIN (HCC): Primary | ICD-10-CM

## 2024-05-29 DIAGNOSIS — Z85.841 ENCOUNTER FOR FOLLOW-UP SURVEILLANCE OF BRAIN CANCER: Primary | ICD-10-CM

## 2024-05-29 PROCEDURE — 99024 POSTOP FOLLOW-UP VISIT: CPT | Performed by: NURSE PRACTITIONER

## 2024-05-29 NOTE — PROGRESS NOTES
Cammy GUERRERO John  5/29/2024  2:47 PM      Vitals:    05/29/24 1444   BP: (!) 142/76   Pulse: 76   Resp: 18   Temp: 97.6 °F (36.4 °C)   SpO2: 97%    :    Wt Readings from Last 3 Encounters:   05/29/24 70.9 kg (156 lb 6.4 oz)   04/25/24 70.8 kg (156 lb)   03/12/24 78.6 kg (173 lb 3.2 oz)                Current Outpatient Medications:     benzonatate (TESSALON) 200 MG capsule, Take 1 capsule by mouth every 12 hours as needed for Cough, Disp: , Rfl:     ARANESP, ALBUMIN FREE, 200 MCG/0.4ML SOSY injection, Inject 0.4 mLs into the skin every 30 days, Disp: , Rfl:     POTASSIUM BICARBONATE PO, Take 20 mEq by mouth daily, Disp: , Rfl:     folic acid (FOLVITE) 1 MG tablet, Take 1 tablet by mouth daily, Disp: , Rfl:     furosemide (LASIX) 20 MG tablet, Take 1 tablet by mouth daily, Disp: , Rfl:     primidone (MYSOLINE) 50 MG tablet, Take 1 tablet by mouth daily, Disp: , Rfl:     predniSONE (DELTASONE) 10 MG tablet, Take 1 tablet by mouth daily with food, Disp: , Rfl:     fentaNYL (DURAGESIC) 25 MCG/HR, APPLY 1 PATCH TOPICALL EVERY 72 HOURS, Disp: , Rfl:     gabapentin (NEURONTIN) 300 MG capsule, Take 1 capsule by mouth nightly., Disp: , Rfl:     ondansetron (ZOFRAN-ODT) 8 MG TBDP disintegrating tablet, , Disp: , Rfl:     oxyCODONE-acetaminophen (PERCOCET)  MG per tablet, Take 1 tablet by mouth every 6 hours as needed., Disp: , Rfl:     prochlorperazine (COMPAZINE) 10 MG tablet, , Disp: , Rfl:     escitalopram (LEXAPRO) 20 MG tablet, TAKE 1 TABLET BY MOUTH EVERY DAY FOR DEPRESSION, Disp: , Rfl:     losartan (COZAAR) 50 MG tablet, TAKE 1 TABLET BY MOUTH EVERYDAY FOR HYPERTENSION, Disp: , Rfl:       Patient is seen today in follow up for completion multiple SRS last one 4/20/24 1fx/2400cGy.        FALLS RISK SCREENING ASSESSMENT    Instructions:  Assess the patient and enter the appropriate indicators that are present for fall risk identification.   Total the numbers entered and assign a fall risk score from Table 2.

## 2024-05-29 NOTE — PROGRESS NOTES
RADIATION ONCOLOGY- ***  *** week follow up       5/29/2024      NAME:  Cammy Lopez    YOB: 1955    Diagnosis:      Subjective:  On ***, Cammy Lopez completed *** cGy in *** fractions directed to the *** for management of ***.    ***    Patient is following with:    Medical Oncology-     Pain: ***    Past medical, surgical, social and family histories reviewed and updated as indicated.    ALLERGIES:  Pcn [penicillins]         Current Outpatient Medications   Medication Sig Dispense Refill    benzonatate (TESSALON) 200 MG capsule Take 1 capsule by mouth every 12 hours as needed for Cough      ARANESP, ALBUMIN FREE, 200 MCG/0.4ML SOSY injection Inject 0.4 mLs into the skin every 30 days      POTASSIUM BICARBONATE PO Take 20 mEq by mouth daily      folic acid (FOLVITE) 1 MG tablet Take 1 tablet by mouth daily      furosemide (LASIX) 20 MG tablet Take 1 tablet by mouth daily      primidone (MYSOLINE) 50 MG tablet Take 1 tablet by mouth daily      predniSONE (DELTASONE) 10 MG tablet Take 1 tablet by mouth daily with food      fentaNYL (DURAGESIC) 25 MCG/HR APPLY 1 PATCH TOPICALL EVERY 72 HOURS      gabapentin (NEURONTIN) 300 MG capsule Take 1 capsule by mouth nightly.      ondansetron (ZOFRAN-ODT) 8 MG TBDP disintegrating tablet       oxyCODONE-acetaminophen (PERCOCET)  MG per tablet Take 1 tablet by mouth every 6 hours as needed.      prochlorperazine (COMPAZINE) 10 MG tablet       escitalopram (LEXAPRO) 20 MG tablet TAKE 1 TABLET BY MOUTH EVERY DAY FOR DEPRESSION      losartan (COZAAR) 50 MG tablet TAKE 1 TABLET BY MOUTH EVERYDAY FOR HYPERTENSION       No current facility-administered medications for this encounter.         Physical Examination:   Vitals:    05/29/24 1444   BP: (!) 142/76   Pulse: 76   Resp: 18   Temp: 97.6 °F (36.4 °C)   SpO2: 97%       Wt Readings from Last 3 Encounters:   05/29/24 70.9 kg (156 lb 6.4 oz)   04/25/24 70.8 kg (156 lb)   03/12/24 78.6 kg (173 lb  spent in counseling the pt on the following:  Results, surveillance guidelines and follow-up care.    The nurses notes were reviewed and incorporated into this assessment and plan.      Questions answered to apparent satisfaction.      Nancy Meadows, MSN, APRN-CNP  Certified Nurse Practitioner for Radiation Oncology  [facility]   SE: P: [phone]/ F: [phone]   [facility]: P: [phone] / F: [phone]   [facility]: P: [phone]/ F: [phone]

## 2024-07-26 ENCOUNTER — HOSPITAL ENCOUNTER (OUTPATIENT)
Dept: MRI IMAGING | Age: 69
End: 2024-07-26
Payer: COMMERCIAL

## 2024-07-26 DIAGNOSIS — Z85.841 ENCOUNTER FOR FOLLOW-UP SURVEILLANCE OF BRAIN CANCER: ICD-10-CM

## 2024-07-26 DIAGNOSIS — Z08 ENCOUNTER FOR FOLLOW-UP SURVEILLANCE OF BRAIN CANCER: ICD-10-CM

## 2024-07-26 PROCEDURE — A9577 INJ MULTIHANCE: HCPCS | Performed by: RADIOLOGY

## 2024-07-26 PROCEDURE — 6360000004 HC RX CONTRAST MEDICATION: Performed by: RADIOLOGY

## 2024-07-26 PROCEDURE — 70553 MRI BRAIN STEM W/O & W/DYE: CPT

## 2024-07-26 RX ADMIN — GADOBENATE DIMEGLUMINE 28 ML: 529 INJECTION, SOLUTION INTRAVENOUS at 16:07

## 2024-07-30 ENCOUNTER — HOSPITAL ENCOUNTER (OUTPATIENT)
Dept: RADIATION ONCOLOGY | Age: 69
Discharge: HOME | End: 2024-07-30

## 2024-07-30 VITALS
TEMPERATURE: 97.4 F | OXYGEN SATURATION: 96 % | BODY MASS INDEX: 27.01 KG/M2 | RESPIRATION RATE: 18 BRPM | SYSTOLIC BLOOD PRESSURE: 122 MMHG | DIASTOLIC BLOOD PRESSURE: 58 MMHG | WEIGHT: 167.35 LBS | HEART RATE: 95 BPM

## 2024-07-30 DIAGNOSIS — Z08 ENCOUNTER FOR FOLLOW-UP SURVEILLANCE OF BRAIN CANCER: Primary | ICD-10-CM

## 2024-07-30 DIAGNOSIS — Z85.841 ENCOUNTER FOR FOLLOW-UP SURVEILLANCE OF BRAIN CANCER: Primary | ICD-10-CM

## 2024-07-30 DIAGNOSIS — C79.31 SECONDARY CANCER OF BRAIN (HCC): Primary | ICD-10-CM

## 2024-07-30 DIAGNOSIS — Z92.3 STATUS POST STEREOTACTIC RADIOSURGERY: ICD-10-CM

## 2024-07-30 DIAGNOSIS — Z71.2 ENCOUNTER TO DISCUSS TEST RESULTS: ICD-10-CM

## 2024-07-30 RX ORDER — BUMETANIDE 1 MG/1
2 TABLET ORAL DAILY
COMMUNITY

## 2024-07-30 NOTE — PROGRESS NOTES
Cammy GUERRERO John  7/30/2024  2:35 PM      Vitals:    07/30/24 1432   BP: (!) 122/58   Pulse: 95   Resp: 18   Temp: 97.4 °F (36.3 °C)   SpO2: 96%    :    Wt Readings from Last 3 Encounters:   07/30/24 75.9 kg (167 lb 5.6 oz)   05/29/24 70.9 kg (156 lb 6.4 oz)   04/25/24 70.8 kg (156 lb)                Current Outpatient Medications:     bumetanide (BUMEX) 1 MG tablet, Take 2 tablets by mouth daily, Disp: , Rfl:     benzonatate (TESSALON) 200 MG capsule, Take 1 capsule by mouth every 12 hours as needed for Cough, Disp: , Rfl:     ARANESP, ALBUMIN FREE, 200 MCG/0.4ML SOSY injection, Inject 0.4 mLs into the skin every 30 days, Disp: , Rfl:     POTASSIUM BICARBONATE PO, Take 20 mEq by mouth daily, Disp: , Rfl:     folic acid (FOLVITE) 1 MG tablet, Take 1 tablet by mouth daily, Disp: , Rfl:     furosemide (LASIX) 20 MG tablet, Take 1 tablet by mouth daily, Disp: , Rfl:     primidone (MYSOLINE) 50 MG tablet, Take 1 tablet by mouth daily, Disp: , Rfl:     predniSONE (DELTASONE) 10 MG tablet, Take 1 tablet by mouth daily with food, Disp: , Rfl:     fentaNYL (DURAGESIC) 25 MCG/HR, APPLY 1 PATCH TOPICALL EVERY 72 HOURS, Disp: , Rfl:     gabapentin (NEURONTIN) 300 MG capsule, Take 1 capsule by mouth nightly., Disp: , Rfl:     ondansetron (ZOFRAN-ODT) 8 MG TBDP disintegrating tablet, , Disp: , Rfl:     oxyCODONE-acetaminophen (PERCOCET)  MG per tablet, Take 1 tablet by mouth every 6 hours as needed., Disp: , Rfl:     prochlorperazine (COMPAZINE) 10 MG tablet, , Disp: , Rfl:     escitalopram (LEXAPRO) 20 MG tablet, TAKE 1 TABLET BY MOUTH EVERY DAY FOR DEPRESSION, Disp: , Rfl:     losartan (COZAAR) 50 MG tablet, TAKE 1 TABLET BY MOUTH EVERYDAY FOR HYPERTENSION, Disp: , Rfl:       Patient is seen today in follow up for Nancy NP. She received RT most recent SRS and has tolerated well. She is following with Dr. Hernandez for medical oncology and she is complaining of bilaterally her feet swelling. They are edematous and she was 
prior at 3 mm left parietal lobe parasagittal region.  No clear evidence for new or progressive metastasis     ORBITS: The visualized portion of the orbits demonstrate no acute abnormality.     SINUSES: The visualized paranasal sinuses and mastoid air cells demonstrate  no acute abnormality.     BONES/SOFT TISSUES: The bone marrow signal intensity appears normal. The soft  tissues demonstrate no acute abnormality.     IMPRESSION:  Stable to slightly decreased size of left frontal and left parietal lobe metastases potential continued treatment response.  No clear evidence for new or progressive metastasis.      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   Systemic therapy Alimta/Carbo/Pembrolizumab, cycle #1 was on 04/13/2023 and cycle #6 was on 07/06/2023.    08/2023 started maintenance Alimta/ Keytruda per Medical Oncology.   Restaging CT scans 03/12/2024 shows stable disease. No new nodule or mass.     Secondary cancer of the brain. CNS metastases x 2.  S/p SRS to right lateral frontal met on 04/18/2023.  No longer visualized on MRI 06/16/2023.   S/p SRS to right high posterior frontal met on 04/20/2023.  No longer visualized on MRI 08/30/2023.     Secondary cancer of the bone. Right sacrum bone metastasis.   S/p SRS to right sacrum met completed 05/01/2023.  07/03/2023 PET/CT showed decreased metabolic activity in right sacral mass.     Secondary cancer of the brain. CNS metastases x 2 on MRI BRAIN 03/07/2024.    SRS to left frontal metastasis, 2400 cGy/ 1 fraction on 03/29/2024.   SRS to left parasaggital parietal metastasis, 2400 cGy/ 1 fraction completed 03/29/2024.    NEW MRI BRAIN 07/26/2024 shows stable to slightly decreased size of left frontal and left parietal lobe metastases. No new or progressive metastasis. MRI BRAIN results discussed today.     Next MRI BRAIN 8-10 weeks to be scheduled during the dates of October 6-13, 2024 (ordered).     I discussed follow up plans

## 2024-09-24 ENCOUNTER — HOSPITAL ENCOUNTER (OUTPATIENT)
Dept: CT IMAGING | Age: 69
Discharge: HOME | End: 2024-09-26
Attending: INTERNAL MEDICINE
Payer: COMMERCIAL

## 2024-09-24 DIAGNOSIS — C79.52 SECONDARY MALIGNANT NEOPLASM OF BONE AND BONE MARROW (HCC): ICD-10-CM

## 2024-09-24 DIAGNOSIS — C34.32 PRIMARY MALIGNANT NEOPLASM OF BRONCHUS OF LEFT LOWER LOBE (HCC): ICD-10-CM

## 2024-09-24 DIAGNOSIS — C79.51 SECONDARY MALIGNANT NEOPLASM OF BONE (HCC): ICD-10-CM

## 2024-09-24 DIAGNOSIS — C79.51 SECONDARY MALIGNANT NEOPLASM OF BONE AND BONE MARROW (HCC): ICD-10-CM

## 2024-09-24 PROCEDURE — 74177 CT ABD & PELVIS W/CONTRAST: CPT

## 2024-09-24 PROCEDURE — 6360000004 HC RX CONTRAST MEDICATION: Performed by: RADIOLOGY

## 2024-09-24 PROCEDURE — 71260 CT THORAX DX C+: CPT

## 2024-09-24 RX ORDER — IOPAMIDOL 755 MG/ML
18 INJECTION, SOLUTION INTRAVASCULAR
Status: COMPLETED | OUTPATIENT
Start: 2024-09-24 | End: 2024-09-24

## 2024-09-24 RX ORDER — SODIUM CHLORIDE 0.9 % (FLUSH) 0.9 %
10 SYRINGE (ML) INJECTION
Status: ACTIVE | OUTPATIENT
Start: 2024-09-24 | End: 2024-09-25

## 2024-09-24 RX ORDER — IOPAMIDOL 755 MG/ML
75 INJECTION, SOLUTION INTRAVASCULAR
Status: COMPLETED | OUTPATIENT
Start: 2024-09-24 | End: 2024-09-24

## 2024-09-24 RX ADMIN — IOPAMIDOL 75 ML: 755 INJECTION, SOLUTION INTRAVENOUS at 16:32

## 2024-09-24 RX ADMIN — IOPAMIDOL 18 ML: 755 INJECTION, SOLUTION INTRAVENOUS at 16:32

## 2024-10-08 ENCOUNTER — HOSPITAL ENCOUNTER (OUTPATIENT)
Dept: GENERAL RADIOLOGY | Age: 69
Discharge: HOME | End: 2024-10-10
Payer: COMMERCIAL

## 2024-10-08 ENCOUNTER — HOSPITAL ENCOUNTER (OUTPATIENT)
Dept: INTERVENTIONAL RADIOLOGY/VASCULAR | Age: 69
Discharge: HOME | End: 2024-10-10
Payer: COMMERCIAL

## 2024-10-08 VITALS
OXYGEN SATURATION: 96 % | DIASTOLIC BLOOD PRESSURE: 68 MMHG | HEART RATE: 65 BPM | RESPIRATION RATE: 16 BRPM | SYSTOLIC BLOOD PRESSURE: 131 MMHG | TEMPERATURE: 97.2 F

## 2024-10-08 DIAGNOSIS — C79.51 SECONDARY MALIGNANT NEOPLASM OF BONE AND BONE MARROW (HCC): ICD-10-CM

## 2024-10-08 DIAGNOSIS — Z98.890 POST-OPERATIVE STATE: ICD-10-CM

## 2024-10-08 DIAGNOSIS — C79.52 SECONDARY MALIGNANT NEOPLASM OF BONE AND BONE MARROW (HCC): ICD-10-CM

## 2024-10-08 DIAGNOSIS — C34.32 PRIMARY MALIGNANT NEOPLASM OF BRONCHUS OF LEFT LOWER LOBE (HCC): ICD-10-CM

## 2024-10-08 LAB
BASOPHILS # BLD: 0.23 K/UL (ref 0–0.2)
BASOPHILS NFR BLD: 3 % (ref 0–2)
CARCINOEMBRYONIC ANTIGEN: 5.5 NG/ML
EOSINOPHIL # BLD: 0.08 K/UL (ref 0.05–0.5)
EOSINOPHILS RELATIVE PERCENT: 1 % (ref 0–6)
ERYTHROCYTE [DISTWIDTH] IN BLOOD BY AUTOMATED COUNT: 14.6 % (ref 11.5–15)
HCT VFR BLD AUTO: 38.1 % (ref 34–48)
HGB BLD-MCNC: 12 G/DL (ref 11.5–15.5)
INR PPP: 1
LDH FLD L TO P-CCNC: 195 U/L
LYMPHOCYTES NFR BLD: 0.85 K/UL (ref 1.5–4)
LYMPHOCYTES RELATIVE PERCENT: 10 % (ref 20–42)
MCH RBC QN AUTO: 34.1 PG (ref 26–35)
MCHC RBC AUTO-ENTMCNC: 31.5 G/DL (ref 32–34.5)
MCV RBC AUTO: 108.2 FL (ref 80–99.9)
MONOCYTES NFR BLD: 0 % (ref 2–12)
MONOCYTES NFR BLD: 0 K/UL (ref 0.1–0.95)
NEUTROPHILS NFR BLD: 87 % (ref 43–80)
NEUTS SEG NFR BLD: 7.74 K/UL (ref 1.8–7.3)
PLATELET # BLD AUTO: 356 K/UL (ref 130–450)
PMV BLD AUTO: 9.3 FL (ref 7–12)
PROT FLD-MCNC: 3.4 G/DL
PROTHROMBIN TIME: 11.1 SEC (ref 9.3–12.4)
RBC # BLD AUTO: 3.52 M/UL (ref 3.5–5.5)
RBC # BLD: ABNORMAL 10*6/UL
SPECIMEN TYPE: NORMAL
WBC OTHER # BLD: 8.9 K/UL (ref 4.5–11.5)

## 2024-10-08 PROCEDURE — 36415 COLL VENOUS BLD VENIPUNCTURE: CPT

## 2024-10-08 PROCEDURE — 85025 COMPLETE CBC W/AUTO DIFF WBC: CPT

## 2024-10-08 PROCEDURE — C1729 CATH, DRAINAGE: HCPCS

## 2024-10-08 PROCEDURE — 84157 ASSAY OF PROTEIN OTHER: CPT

## 2024-10-08 PROCEDURE — 88305 TISSUE EXAM BY PATHOLOGIST: CPT

## 2024-10-08 PROCEDURE — 82378 CARCINOEMBRYONIC ANTIGEN: CPT

## 2024-10-08 PROCEDURE — 32555 ASPIRATE PLEURA W/ IMAGING: CPT

## 2024-10-08 PROCEDURE — 85610 PROTHROMBIN TIME: CPT

## 2024-10-08 PROCEDURE — 88112 CYTOPATH CELL ENHANCE TECH: CPT

## 2024-10-08 PROCEDURE — 71045 X-RAY EXAM CHEST 1 VIEW: CPT

## 2024-10-08 PROCEDURE — 83615 LACTATE (LD) (LDH) ENZYME: CPT

## 2024-10-08 NOTE — BRIEF OP NOTE
Brief-Op Note  ____________________________________________________________      IR  U/S GUIDED THORACENTESIS  [facility] SPECIAL PROCEDURES    Patient Name: Cammy Lopez   YOB: 1955  Medical Record Number: [id number]  Date of Procedure: 10/8/24  Room/Bed: Room/bed info not found    Preoperative diagnosis: left Pleural Effusion    Postoperative diagnosis: Same    Consent: The patient was counseled regarding the procedure, it's indications, risks, potential complications and alternatives and any questions were answered.     Procedure:  Image Guided left Thoracentesis.    Anesthesia: Local anesthesia.    Performed by: KANDIS Crespo under on-site supervision by Kirill Lemos MD.    Estimated blood loss: Less than 10 mL    Complications: None    Specimen Obtained: A total volume of  850mL was withdrawn    Electronically signed by KANDIS Crespo   DD: 10/8/24  9:11 AM

## 2024-10-08 NOTE — OP NOTE
and the needle was withdrawn.  Pleural fluid return was clear straw colored. The catheter was then withdrawn and a sterile dressing was placed over the site.  The patient tolerated the procedure well.     A total volume of  850mL was withdrawn    Complications: None.     Estimated Blood Loss: < 10cc.   .      Thank you for allowing Interventional Radiology to participate in the care of Cammy Lopez.     Electronically signed by KANDIS Crespo   DD: 10/8/24  9:12 AM

## 2024-10-08 NOTE — PROCEDURES
PROCEDURE NOTE  Date: 10/8/2024   Name: Cammy Lopez  YOB: 1955    Procedures: Left Thoracentesis  Patient arrived to Radiology department for Left Thoracentest. Allergies, home medications, H&P and fasting instructions reviewed with patient. Vital signs taken. 20g IV placed, blood obtained. Blood sample sent to lab for ordered tests.  Procedural instructions given, questions answered, understanding expressed and consent signed. Patient given fluoroscopy education, no questions at this time.

## 2024-10-10 ENCOUNTER — HOSPITAL ENCOUNTER (OUTPATIENT)
Dept: MRI IMAGING | Age: 69
Discharge: HOME | End: 2024-10-12
Payer: COMMERCIAL

## 2024-10-10 DIAGNOSIS — Z85.841 ENCOUNTER FOR FOLLOW-UP SURVEILLANCE OF BRAIN CANCER: ICD-10-CM

## 2024-10-10 DIAGNOSIS — Z08 ENCOUNTER FOR FOLLOW-UP SURVEILLANCE OF BRAIN CANCER: ICD-10-CM

## 2024-10-10 LAB — NON-GYN CYTOLOGY REPORT: NORMAL

## 2024-10-10 PROCEDURE — 6360000004 HC RX CONTRAST MEDICATION: Performed by: RADIOLOGY

## 2024-10-10 PROCEDURE — A9577 INJ MULTIHANCE: HCPCS | Performed by: RADIOLOGY

## 2024-10-10 PROCEDURE — 70553 MRI BRAIN STEM W/O & W/DYE: CPT

## 2024-10-10 RX ADMIN — GADOBENATE DIMEGLUMINE 15 ML: 529 INJECTION, SOLUTION INTRAVENOUS at 17:45

## 2024-10-15 ENCOUNTER — HOSPITAL ENCOUNTER (OUTPATIENT)
Dept: RADIATION ONCOLOGY | Age: 69
Discharge: HOME | End: 2024-10-15

## 2024-10-15 DIAGNOSIS — C79.31 SECONDARY CANCER OF BRAIN (HCC): Primary | ICD-10-CM

## 2024-10-15 DIAGNOSIS — Z85.841 ENCOUNTER FOR FOLLOW-UP SURVEILLANCE OF BRAIN CANCER: Primary | ICD-10-CM

## 2024-10-15 DIAGNOSIS — Z08 ENCOUNTER FOR FOLLOW-UP SURVEILLANCE OF BRAIN CANCER: Primary | ICD-10-CM

## 2024-10-15 DIAGNOSIS — Z92.3 STATUS POST STEREOTACTIC RADIOSURGERY: ICD-10-CM

## 2024-10-15 NOTE — PROGRESS NOTES
RADIATION ONCOLOGY  3 month follow up    10/15/2024    NAME:  Cammy Lopez    YOB: 1955    Diagnosis:  Stage IV adenocarcinoma of the left lung, CNS and bone metastases      Subjective:  Cammy Lopez is a 68 year old female with a diagnosis of stage IV adenocarcinoma of the left lung that is TTF-1 negative. CNS and bone metastases.     03/22/2023 MRI BRAIN:   Solitary 10 metastasis in the high posterior right frontal lobe.   Moderate chronic microvascular ischemic changes.     03/27/2023 PET/CT SCAN:  Metabolically active mass in the posterior left hilar region extending into the hilum.  Metabolically active skeletal metastasis in the right aspect of the sacrum.  Metabolically active right metastasis corresponding to the finding on recent brain MRI.     03/30/2023 MRI BRAIN (SRS planning):  High posterior right frontal lobe 10 mm metastasis with mild vasogenic edema.   Right lateral frontal 2-3 mm metastasis.      04/13/2023: Cycle # 1 Carbo/ Alimta/ Keytruda per Medical Oncology. The patient also started on Xgeva per Medical Oncology.     S/p stereotactic radiosurgery (SRS):  Right lateral frontal met, SRS 2400 cGy/1 fraction on 04/18/2023.  Right high posterior frontal met, SRS 2400 cGy/ 1 fraction on 04/20/2023.  Right sacrum bone met, SRS 2400 cGy/ 4 fraction completed 05/01/2023.      05/17/2023: Radiation Oncology post SRS visit. The patient presented tearful complained of increased fatigue, myalgias/ arthralgias along with left sided swelling: mild left wrist, mild to moderate left knee swelling, S/p fall on 05/03/2023- impact to left knee and left upper arm swelling S/p injection XGEVA on 05/11/2023.     Medical Oncology notes reviewed. The patient started on Mobic for knee pain/ swelling. X-rays to left hand + wrist along with left knee + ankle completed. No acute fractures. Soft tissue swelling left hand- mild. Suprapatellar soft tissue swelling- left knee. The patient

## 2024-10-18 VITALS
WEIGHT: 168.44 LBS | OXYGEN SATURATION: 93 % | DIASTOLIC BLOOD PRESSURE: 57 MMHG | HEART RATE: 84 BPM | BODY MASS INDEX: 27.19 KG/M2 | TEMPERATURE: 97.2 F | RESPIRATION RATE: 20 BRPM | SYSTOLIC BLOOD PRESSURE: 110 MMHG

## 2024-12-21 ENCOUNTER — HOSPITAL ENCOUNTER (EMERGENCY)
Age: 69
Discharge: HOME | End: 2024-12-21
Attending: EMERGENCY MEDICINE
Payer: COMMERCIAL

## 2024-12-21 ENCOUNTER — APPOINTMENT (OUTPATIENT)
Dept: GENERAL RADIOLOGY | Age: 69
End: 2024-12-21
Payer: COMMERCIAL

## 2024-12-21 VITALS
OXYGEN SATURATION: 96 % | TEMPERATURE: 98.4 F | BODY MASS INDEX: 26.33 KG/M2 | WEIGHT: 158 LBS | SYSTOLIC BLOOD PRESSURE: 96 MMHG | HEART RATE: 84 BPM | DIASTOLIC BLOOD PRESSURE: 66 MMHG | HEIGHT: 65 IN | RESPIRATION RATE: 20 BRPM

## 2024-12-21 DIAGNOSIS — R11.2 NAUSEA AND VOMITING, UNSPECIFIED VOMITING TYPE: ICD-10-CM

## 2024-12-21 DIAGNOSIS — E83.42 HYPOMAGNESEMIA: Primary | ICD-10-CM

## 2024-12-21 LAB
ABO + RH BLD: NORMAL
ALBUMIN SERPL-MCNC: 3.1 G/DL (ref 3.5–5.2)
ALP SERPL-CCNC: 82 U/L (ref 35–104)
ALT SERPL-CCNC: 11 U/L (ref 0–32)
ANION GAP SERPL CALCULATED.3IONS-SCNC: 13 MMOL/L (ref 7–16)
ARM BAND NUMBER: NORMAL
AST SERPL-CCNC: 14 U/L (ref 0–31)
ATYPICAL LYMPHOCYTE ABSOLUTE COUNT: 0.04 K/UL (ref 0–0.46)
ATYPICAL LYMPHOCYTES: 1 % (ref 0–4)
BASOPHILS # BLD: 0.04 K/UL (ref 0–0.2)
BASOPHILS NFR BLD: 1 % (ref 0–2)
BILIRUB SERPL-MCNC: 0.3 MG/DL (ref 0–1.2)
BILIRUB UR QL STRIP: NEGATIVE
BLOOD BANK SAMPLE EXPIRATION: NORMAL
BLOOD GROUP ANTIBODIES SERPL: NEGATIVE
BUN SERPL-MCNC: 13 MG/DL (ref 6–23)
CALCIUM SERPL-MCNC: 8.3 MG/DL (ref 8.6–10.2)
CHLORIDE SERPL-SCNC: 97 MMOL/L (ref 98–107)
CLARITY UR: CLEAR
CO2 SERPL-SCNC: 23 MMOL/L (ref 22–29)
COLOR UR: YELLOW
CREAT SERPL-MCNC: 1 MG/DL (ref 0.5–1)
EOSINOPHIL # BLD: 0 K/UL (ref 0.05–0.5)
EOSINOPHILS RELATIVE PERCENT: 0 % (ref 0–6)
ERYTHROCYTE [DISTWIDTH] IN BLOOD BY AUTOMATED COUNT: 14.4 % (ref 11.5–15)
GFR, ESTIMATED: 66 ML/MIN/1.73M2
GLUCOSE SERPL-MCNC: 192 MG/DL (ref 74–99)
GLUCOSE UR STRIP-MCNC: NEGATIVE MG/DL
HCT VFR BLD AUTO: 35.3 % (ref 34–48)
HGB BLD-MCNC: 11.2 G/DL (ref 11.5–15.5)
HGB UR QL STRIP.AUTO: NEGATIVE
KETONES UR STRIP-MCNC: NEGATIVE MG/DL
LEUKOCYTE ESTERASE UR QL STRIP: NEGATIVE
LYMPHOCYTES NFR BLD: 0.42 K/UL (ref 1.5–4)
LYMPHOCYTES RELATIVE PERCENT: 11 % (ref 20–42)
MAGNESIUM SERPL-MCNC: 1.5 MG/DL (ref 1.6–2.6)
MCH RBC QN AUTO: 32.8 PG (ref 26–35)
MCHC RBC AUTO-ENTMCNC: 31.7 G/DL (ref 32–34.5)
MCV RBC AUTO: 103.5 FL (ref 80–99.9)
METAMYELOCYTES ABSOLUTE COUNT: 0.04 K/UL (ref 0–0.12)
METAMYELOCYTES: 1 % (ref 0–1)
MONOCYTES NFR BLD: 0.56 K/UL (ref 0.1–0.95)
MONOCYTES NFR BLD: 14 % (ref 2–12)
MYELOCYTES ABSOLUTE COUNT: 0.04 K/UL
MYELOCYTES: 1 %
NEUTROPHILS NFR BLD: 72 % (ref 43–80)
NEUTS SEG NFR BLD: 2.88 K/UL (ref 1.8–7.3)
NITRITE UR QL STRIP: NEGATIVE
PH UR STRIP: 6 [PH] (ref 5–9)
PLATELET # BLD AUTO: 266 K/UL (ref 130–450)
PMV BLD AUTO: 11 FL (ref 7–12)
POTASSIUM SERPL-SCNC: 4.4 MMOL/L (ref 3.5–5)
PROT SERPL-MCNC: 6 G/DL (ref 6.4–8.3)
PROT UR STRIP-MCNC: NEGATIVE MG/DL
RBC # BLD AUTO: 3.41 M/UL (ref 3.5–5.5)
RBC # BLD: ABNORMAL 10*6/UL
RBC #/AREA URNS HPF: NORMAL /HPF
SODIUM SERPL-SCNC: 133 MMOL/L (ref 132–146)
SP GR UR STRIP: 1.01 (ref 1–1.03)
UROBILINOGEN UR STRIP-ACNC: 0.2 EU/DL (ref 0–1)
WBC #/AREA URNS HPF: NORMAL /HPF
WBC OTHER # BLD: 4 K/UL (ref 4.5–11.5)

## 2024-12-21 PROCEDURE — 0202U NFCT DS 22 TRGT SARS-COV-2: CPT

## 2024-12-21 PROCEDURE — 87040 BLOOD CULTURE FOR BACTERIA: CPT

## 2024-12-21 PROCEDURE — 2580000003 HC RX 258: Performed by: EMERGENCY MEDICINE

## 2024-12-21 PROCEDURE — 80053 COMPREHEN METABOLIC PANEL: CPT

## 2024-12-21 PROCEDURE — 86900 BLOOD TYPING SEROLOGIC ABO: CPT

## 2024-12-21 PROCEDURE — 71045 X-RAY EXAM CHEST 1 VIEW: CPT

## 2024-12-21 PROCEDURE — 96366 THER/PROPH/DIAG IV INF ADDON: CPT

## 2024-12-21 PROCEDURE — 86901 BLOOD TYPING SEROLOGIC RH(D): CPT

## 2024-12-21 PROCEDURE — 96365 THER/PROPH/DIAG IV INF INIT: CPT

## 2024-12-21 PROCEDURE — 6360000002 HC RX W HCPCS: Performed by: EMERGENCY MEDICINE

## 2024-12-21 PROCEDURE — 85025 COMPLETE CBC W/AUTO DIFF WBC: CPT

## 2024-12-21 PROCEDURE — 81001 URINALYSIS AUTO W/SCOPE: CPT

## 2024-12-21 PROCEDURE — 83735 ASSAY OF MAGNESIUM: CPT

## 2024-12-21 PROCEDURE — 87086 URINE CULTURE/COLONY COUNT: CPT

## 2024-12-21 PROCEDURE — 86850 RBC ANTIBODY SCREEN: CPT

## 2024-12-21 PROCEDURE — 99284 EMERGENCY DEPT VISIT MOD MDM: CPT

## 2024-12-21 RX ORDER — 0.9 % SODIUM CHLORIDE 0.9 %
1000 INTRAVENOUS SOLUTION INTRAVENOUS ONCE
Status: COMPLETED | OUTPATIENT
Start: 2024-12-21 | End: 2024-12-21

## 2024-12-21 RX ORDER — MAGNESIUM SULFATE IN WATER 40 MG/ML
2000 INJECTION, SOLUTION INTRAVENOUS ONCE
Status: COMPLETED | OUTPATIENT
Start: 2024-12-21 | End: 2024-12-21

## 2024-12-21 RX ADMIN — SODIUM CHLORIDE 1000 ML: 9 INJECTION, SOLUTION INTRAVENOUS at 14:55

## 2024-12-21 RX ADMIN — MAGNESIUM SULFATE HEPTAHYDRATE 2000 MG: 40 INJECTION, SOLUTION INTRAVENOUS at 18:24

## 2024-12-21 ASSESSMENT — PAIN SCALES - GENERAL: PAINLEVEL_OUTOF10: 0

## 2024-12-21 ASSESSMENT — PAIN - FUNCTIONAL ASSESSMENT: PAIN_FUNCTIONAL_ASSESSMENT: 0-10

## 2024-12-21 NOTE — ED PROVIDER NOTES
HPI:  12/21/24, Time: 3:35 PM SANNA Lopez is a 69 y.o. female presenting to the ED for ***, beginning *** ago.  The complaint has been {Desc; intermittent/persistent/constant:92864}, {DESC; MILD/MOD/SEVERE:79365} in severity, and worsened by {Modify factor:79357}.  ***    Review of Systems:   A complete review of systems was performed and pertinent positives and negatives are stated within HPI, all other systems reviewed and are negative.    --------------------------------------------- PAST HISTORY ---------------------------------------------  Past Medical History:  has a past medical history of Hypertension, Lacrimal duct stenosis, bilateral, Malignant neoplasm of lower lobe of left lung (HCC), Secondary cancer of bone (HCC), Secondary cancer of brain (HCC), Secondary cancer of brain (HCC), Status post stereotactic radiosurgery, Status post stereotactic radiosurgery, Status post stereotactic radiosurgery, Status post stereotactic radiosurgery, Status post stereotactic radiosurgery, and Thyroid disease.    Past Surgical History:  has a past surgical history that includes bronchoscopy (N/A, 3/7/2023); bronchoscopy (N/A, 3/7/2023); bronchoscopy (3/7/2023); bronchoscopy (3/7/2023); bronchoscopy (3/7/2023); CT BIOPSY DEEP BONE PERCUTANEOUS (3/31/2023); and Port Surgery (Right, 7/12/2023).    Social History:  reports that she has been smoking cigarettes. She started smoking about 7 months ago. She has a 33.9 pack-year smoking history. She has never used smokeless tobacco. She reports that she does not currently use alcohol. She reports that she does not use drugs.    Family History: family history includes Cancer in her father and mother.     The patient’s home medications have been reviewed.    Allergies: Pcn [penicillins]    -------------------------------------------------- RESULTS -------------------------------------------------  All laboratory and radiology results have been personally

## 2024-12-22 LAB
B PARAP IS1001 DNA NPH QL NAA+NON-PROBE: NOT DETECTED
B PERT DNA SPEC QL NAA+PROBE: NOT DETECTED
C PNEUM DNA NPH QL NAA+NON-PROBE: NOT DETECTED
FLUAV RNA NPH QL NAA+NON-PROBE: NOT DETECTED
FLUBV RNA NPH QL NAA+NON-PROBE: NOT DETECTED
HADV DNA NPH QL NAA+NON-PROBE: NOT DETECTED
HCOV 229E RNA NPH QL NAA+NON-PROBE: NOT DETECTED
HCOV HKU1 RNA NPH QL NAA+NON-PROBE: NOT DETECTED
HCOV NL63 RNA NPH QL NAA+NON-PROBE: NOT DETECTED
HCOV OC43 RNA NPH QL NAA+NON-PROBE: NOT DETECTED
HMPV RNA NPH QL NAA+NON-PROBE: NOT DETECTED
HPIV1 RNA NPH QL NAA+NON-PROBE: NOT DETECTED
HPIV2 RNA NPH QL NAA+NON-PROBE: NOT DETECTED
HPIV3 RNA NPH QL NAA+NON-PROBE: NOT DETECTED
HPIV4 RNA NPH QL NAA+NON-PROBE: NOT DETECTED
M PNEUMO DNA NPH QL NAA+NON-PROBE: NOT DETECTED
MICROORGANISM SPEC CULT: ABNORMAL
MICROORGANISM SPEC CULT: NORMAL
MICROORGANISM SPEC CULT: NORMAL
RSV RNA NPH QL NAA+NON-PROBE: NOT DETECTED
RV+EV RNA NPH QL NAA+NON-PROBE: NOT DETECTED
SARS-COV-2 RNA NPH QL NAA+NON-PROBE: NOT DETECTED
SERVICE CMNT-IMP: NORMAL
SERVICE CMNT-IMP: NORMAL
SPECIMEN DESCRIPTION: ABNORMAL
SPECIMEN DESCRIPTION: NORMAL

## 2024-12-22 NOTE — DISCHARGE INSTR - COC
Continuity of Care Form    Patient Name: Cammy Lopez   :  1955  MRN:  [id number]    Admit date:  2024  Discharge date:  ***    Code Status Order: Prior   Advance Directives:   Advance Care Flowsheet Documentation             Admitting Physician:  No admitting provider for patient encounter.  PCP: Jovani Santos DO    Discharging Nurse: ***  Discharging Hospital Unit/Room#:   Discharging Unit Phone Number: ***    Emergency Contact:   Extended Emergency Contact Information  Primary Emergency Contact: [place] [address] [facility]  Home Phone: [phone]  Relation: Brother/Sister  Secondary Emergency Contact: Dianna Arauz  Home Phone: [phone]  Mobile Phone: [phone]  Relation: Aunt/Uncle  Preferred language: English   needed? No    Past Surgical History:  Past Surgical History:   Procedure Laterality Date    BRONCHOSCOPY N/A 3/7/2023    BRONCHOSCOPY ENDOBRONCHIAL ULTRASOUND, TBB, FNA,TBL, CYTOLOGY [place] [facility]    BRONCHOSCOPY N/A 3/7/2023    BRONCHOSCOPY BRUSHINGS performed by Raj Keen MD at [facility]    BRONCHOSCOPY  3/7/2023    BRONCHOSCOPY W/EBUS FNA performed by Raj Keen MD at [facility]    BRONCHOSCOPY  3/7/2023    BRONCHOSCOPY DIAGNOSTIC OR CELL WASH ONLY performed by Raj Keen MD at [facility]    BRONCHOSCOPY  3/7/2023    BRONCHOSCOPY BIOPSY BRONCHUS performed by Raj Keen MD at [facility]    CT BIOPSY DEEP BONE PERCUTANEOUS  3/31/2023    CT BIOPSY PERCUTANEOUS DEEP BONE 3/31/2023 Celestino Workman MD [facility] [place]    PORT SURGERY Right 2023    PORT INSERTION performed by Emili Alberts MD at [facility] [place]       Immunization History:   Immunization History   Administered Date(s) Administered    COVID-19, PFIZER PURPLE top, DILUTE for use, (age 12 y+), 30mcg/0.3mL 2021, 04/15/2021, 12/15/2021       Active

## 2024-12-26 ENCOUNTER — APPOINTMENT (OUTPATIENT)
Dept: CT IMAGING | Age: 69
DRG: 175 | End: 2024-12-26
Payer: COMMERCIAL

## 2024-12-26 ENCOUNTER — HOSPITAL ENCOUNTER (INPATIENT)
Age: 69
LOS: 5 days | Discharge: HOME HEALTH | DRG: 175 | End: 2024-12-31
Attending: EMERGENCY MEDICINE | Admitting: STUDENT IN AN ORGANIZED HEALTH CARE EDUCATION/TRAINING PROGRAM
Payer: COMMERCIAL

## 2024-12-26 DIAGNOSIS — I26.99 PULMONARY EMBOLISM WITHOUT ACUTE COR PULMONALE, UNSPECIFIED CHRONICITY, UNSPECIFIED PULMONARY EMBOLISM TYPE (HCC): ICD-10-CM

## 2024-12-26 DIAGNOSIS — R91.8 LUNG MASS: ICD-10-CM

## 2024-12-26 DIAGNOSIS — I26.99 ACUTE PULMONARY EMBOLISM WITHOUT ACUTE COR PULMONALE, UNSPECIFIED PULMONARY EMBOLISM TYPE (HCC): Primary | ICD-10-CM

## 2024-12-26 DIAGNOSIS — J90 PLEURAL EFFUSION ON LEFT: ICD-10-CM

## 2024-12-26 DIAGNOSIS — J18.9 PNEUMONIA DUE TO INFECTIOUS ORGANISM, UNSPECIFIED LATERALITY, UNSPECIFIED PART OF LUNG: ICD-10-CM

## 2024-12-26 DIAGNOSIS — D72.829 LEUKOCYTOSIS, UNSPECIFIED TYPE: ICD-10-CM

## 2024-12-26 LAB
ALBUMIN SERPL-MCNC: 2.9 G/DL (ref 3.5–5.2)
ALP SERPL-CCNC: 159 U/L (ref 35–104)
ALT SERPL-CCNC: 13 U/L (ref 0–32)
ANION GAP SERPL CALCULATED.3IONS-SCNC: 11 MMOL/L (ref 7–16)
AST SERPL-CCNC: 18 U/L (ref 0–31)
BASOPHILS # BLD: 0 K/UL (ref 0–0.2)
BASOPHILS NFR BLD: 0 % (ref 0–2)
BILIRUB SERPL-MCNC: 0.2 MG/DL (ref 0–1.2)
BNP SERPL-MCNC: 1153 PG/ML (ref 0–125)
BUN SERPL-MCNC: 12 MG/DL (ref 6–23)
CALCIUM SERPL-MCNC: 7.5 MG/DL (ref 8.6–10.2)
CHLORIDE SERPL-SCNC: 97 MMOL/L (ref 98–107)
CO2 SERPL-SCNC: 26 MMOL/L (ref 22–29)
CREAT SERPL-MCNC: 0.8 MG/DL (ref 0.5–1)
EKG ATRIAL RATE: 87 BPM
EKG P AXIS: 71 DEGREES
EKG P-R INTERVAL: 112 MS
EKG Q-T INTERVAL: 336 MS
EKG QRS DURATION: 62 MS
EKG QTC CALCULATION (BAZETT): 404 MS
EKG R AXIS: 56 DEGREES
EKG T AXIS: 44 DEGREES
EKG VENTRICULAR RATE: 87 BPM
EOSINOPHIL # BLD: 0.27 K/UL (ref 0.05–0.5)
EOSINOPHILS RELATIVE PERCENT: 1 % (ref 0–6)
ERYTHROCYTE [DISTWIDTH] IN BLOOD BY AUTOMATED COUNT: 15.2 % (ref 11.5–15)
ERYTHROCYTE [DISTWIDTH] IN BLOOD BY AUTOMATED COUNT: 15.4 % (ref 11.5–15)
GFR, ESTIMATED: 80 ML/MIN/1.73M2
GLUCOSE SERPL-MCNC: 175 MG/DL (ref 74–99)
HCT VFR BLD AUTO: 32.9 % (ref 34–48)
HCT VFR BLD AUTO: 36.8 % (ref 34–48)
HGB BLD-MCNC: 10.5 G/DL (ref 11.5–15.5)
HGB BLD-MCNC: 11.5 G/DL (ref 11.5–15.5)
LACTATE BLDV-SCNC: 1.6 MMOL/L (ref 0.5–1.9)
LYMPHOCYTES NFR BLD: 0 K/UL (ref 1.5–4)
LYMPHOCYTES RELATIVE PERCENT: 0 % (ref 20–42)
MAGNESIUM SERPL-MCNC: 1.9 MG/DL (ref 1.6–2.6)
MCH RBC QN AUTO: 32.8 PG (ref 26–35)
MCH RBC QN AUTO: 33 PG (ref 26–35)
MCHC RBC AUTO-ENTMCNC: 31.3 G/DL (ref 32–34.5)
MCHC RBC AUTO-ENTMCNC: 31.9 G/DL (ref 32–34.5)
MCV RBC AUTO: 102.8 FL (ref 80–99.9)
MCV RBC AUTO: 105.4 FL (ref 80–99.9)
MICROORGANISM SPEC CULT: NORMAL
MICROORGANISM SPEC CULT: NORMAL
MONOCYTES NFR BLD: 1.37 K/UL (ref 0.1–0.95)
MONOCYTES NFR BLD: 4 % (ref 2–12)
NEUTROPHILS NFR BLD: 95 % (ref 43–80)
NEUTS SEG NFR BLD: 30.06 K/UL (ref 1.8–7.3)
PARTIAL THROMBOPLASTIN TIME: 25 SEC (ref 24.5–35.1)
PLATELET # BLD AUTO: 287 K/UL (ref 130–450)
PLATELET # BLD AUTO: 289 K/UL (ref 130–450)
PMV BLD AUTO: 10.1 FL (ref 7–12)
PMV BLD AUTO: 10.3 FL (ref 7–12)
POTASSIUM SERPL-SCNC: 4.4 MMOL/L (ref 3.5–5)
PROCALCITONIN SERPL-MCNC: 0.14 NG/ML (ref 0–0.08)
PROT SERPL-MCNC: 6.7 G/DL (ref 6.4–8.3)
RBC # BLD AUTO: 3.2 M/UL (ref 3.5–5.5)
RBC # BLD AUTO: 3.49 M/UL (ref 3.5–5.5)
RBC # BLD: ABNORMAL 10*6/UL
SERVICE CMNT-IMP: NORMAL
SERVICE CMNT-IMP: NORMAL
SODIUM SERPL-SCNC: 134 MMOL/L (ref 132–146)
SPECIMEN DESCRIPTION: NORMAL
SPECIMEN DESCRIPTION: NORMAL
TROPONIN I SERPL HS-MCNC: 41 NG/L (ref 0–9)
TROPONIN I SERPL HS-MCNC: 50 NG/L (ref 0–9)
WBC OTHER # BLD: 31.7 K/UL (ref 4.5–11.5)
WBC OTHER # BLD: 32.3 K/UL (ref 4.5–11.5)

## 2024-12-26 PROCEDURE — 6370000000 HC RX 637 (ALT 250 FOR IP): Performed by: STUDENT IN AN ORGANIZED HEALTH CARE EDUCATION/TRAINING PROGRAM

## 2024-12-26 PROCEDURE — 2580000003 HC RX 258: Performed by: EMERGENCY MEDICINE

## 2024-12-26 PROCEDURE — 96366 THER/PROPH/DIAG IV INF ADDON: CPT

## 2024-12-26 PROCEDURE — 93010 ELECTROCARDIOGRAM REPORT: CPT | Performed by: INTERNAL MEDICINE

## 2024-12-26 PROCEDURE — 99223 1ST HOSP IP/OBS HIGH 75: CPT | Performed by: STUDENT IN AN ORGANIZED HEALTH CARE EDUCATION/TRAINING PROGRAM

## 2024-12-26 PROCEDURE — 6360000004 HC RX CONTRAST MEDICATION: Performed by: RADIOLOGY

## 2024-12-26 PROCEDURE — 87040 BLOOD CULTURE FOR BACTERIA: CPT

## 2024-12-26 PROCEDURE — 80053 COMPREHEN METABOLIC PANEL: CPT

## 2024-12-26 PROCEDURE — 96375 TX/PRO/DX INJ NEW DRUG ADDON: CPT

## 2024-12-26 PROCEDURE — 83735 ASSAY OF MAGNESIUM: CPT

## 2024-12-26 PROCEDURE — 93005 ELECTROCARDIOGRAM TRACING: CPT | Performed by: EMERGENCY MEDICINE

## 2024-12-26 PROCEDURE — 85730 THROMBOPLASTIN TIME PARTIAL: CPT

## 2024-12-26 PROCEDURE — 83880 ASSAY OF NATRIURETIC PEPTIDE: CPT

## 2024-12-26 PROCEDURE — 84145 PROCALCITONIN (PCT): CPT

## 2024-12-26 PROCEDURE — 2060000000 HC ICU INTERMEDIATE R&B

## 2024-12-26 PROCEDURE — 85027 COMPLETE CBC AUTOMATED: CPT

## 2024-12-26 PROCEDURE — 83605 ASSAY OF LACTIC ACID: CPT

## 2024-12-26 PROCEDURE — 96374 THER/PROPH/DIAG INJ IV PUSH: CPT

## 2024-12-26 PROCEDURE — 6360000002 HC RX W HCPCS: Performed by: EMERGENCY MEDICINE

## 2024-12-26 PROCEDURE — 96367 TX/PROPH/DG ADDL SEQ IV INF: CPT

## 2024-12-26 PROCEDURE — 71275 CT ANGIOGRAPHY CHEST: CPT

## 2024-12-26 PROCEDURE — 96365 THER/PROPH/DIAG IV INF INIT: CPT

## 2024-12-26 PROCEDURE — 85025 COMPLETE CBC W/AUTO DIFF WBC: CPT

## 2024-12-26 PROCEDURE — 6360000002 HC RX W HCPCS: Performed by: INTERNAL MEDICINE

## 2024-12-26 PROCEDURE — 2580000003 HC RX 258: Performed by: INTERNAL MEDICINE

## 2024-12-26 PROCEDURE — 99291 CRITICAL CARE FIRST HOUR: CPT

## 2024-12-26 PROCEDURE — 84484 ASSAY OF TROPONIN QUANT: CPT

## 2024-12-26 PROCEDURE — 70450 CT HEAD/BRAIN W/O DYE: CPT

## 2024-12-26 RX ORDER — SODIUM CHLORIDE 0.9 % (FLUSH) 0.9 %
5-40 SYRINGE (ML) INJECTION EVERY 12 HOURS SCHEDULED
Status: DISCONTINUED | OUTPATIENT
Start: 2024-12-26 | End: 2024-12-31 | Stop reason: HOSPADM

## 2024-12-26 RX ORDER — ONDANSETRON 2 MG/ML
4 INJECTION INTRAMUSCULAR; INTRAVENOUS EVERY 6 HOURS PRN
Status: DISCONTINUED | OUTPATIENT
Start: 2024-12-26 | End: 2024-12-31 | Stop reason: HOSPADM

## 2024-12-26 RX ORDER — HEPARIN SODIUM 1000 [USP'U]/ML
80 INJECTION, SOLUTION INTRAVENOUS; SUBCUTANEOUS ONCE
Status: COMPLETED | OUTPATIENT
Start: 2024-12-26 | End: 2024-12-26

## 2024-12-26 RX ORDER — ACETAMINOPHEN 325 MG/1
650 TABLET ORAL EVERY 6 HOURS PRN
Status: DISCONTINUED | OUTPATIENT
Start: 2024-12-26 | End: 2024-12-31 | Stop reason: HOSPADM

## 2024-12-26 RX ORDER — ESCITALOPRAM OXALATE 10 MG/1
20 TABLET ORAL DAILY
Status: DISCONTINUED | OUTPATIENT
Start: 2024-12-27 | End: 2024-12-31 | Stop reason: HOSPADM

## 2024-12-26 RX ORDER — HEPARIN SODIUM 10000 [USP'U]/100ML
5-30 INJECTION, SOLUTION INTRAVENOUS CONTINUOUS
Status: DISCONTINUED | OUTPATIENT
Start: 2024-12-26 | End: 2024-12-28

## 2024-12-26 RX ORDER — POTASSIUM CHLORIDE 750 MG/1
10 CAPSULE, EXTENDED RELEASE ORAL DAILY
COMMUNITY
Start: 2024-12-06

## 2024-12-26 RX ORDER — GABAPENTIN 300 MG/1
300 CAPSULE ORAL NIGHTLY
Status: DISCONTINUED | OUTPATIENT
Start: 2024-12-26 | End: 2024-12-31 | Stop reason: HOSPADM

## 2024-12-26 RX ORDER — POLYETHYLENE GLYCOL 3350 17 G/17G
17 POWDER, FOR SOLUTION ORAL DAILY PRN
Status: DISCONTINUED | OUTPATIENT
Start: 2024-12-26 | End: 2024-12-31 | Stop reason: HOSPADM

## 2024-12-26 RX ORDER — IOPAMIDOL 755 MG/ML
75 INJECTION, SOLUTION INTRAVASCULAR
Status: COMPLETED | OUTPATIENT
Start: 2024-12-26 | End: 2024-12-26

## 2024-12-26 RX ORDER — LOSARTAN POTASSIUM 50 MG/1
50 TABLET ORAL DAILY
Status: DISCONTINUED | OUTPATIENT
Start: 2024-12-27 | End: 2024-12-31 | Stop reason: HOSPADM

## 2024-12-26 RX ORDER — OXYCODONE AND ACETAMINOPHEN 10; 325 MG/1; MG/1
1 TABLET ORAL EVERY 6 HOURS PRN
Status: DISCONTINUED | OUTPATIENT
Start: 2024-12-26 | End: 2024-12-30

## 2024-12-26 RX ORDER — MAGNESIUM SULFATE IN WATER 40 MG/ML
2000 INJECTION, SOLUTION INTRAVENOUS PRN
Status: DISCONTINUED | OUTPATIENT
Start: 2024-12-26 | End: 2024-12-31 | Stop reason: HOSPADM

## 2024-12-26 RX ORDER — SODIUM CHLORIDE 0.9 % (FLUSH) 0.9 %
5-40 SYRINGE (ML) INJECTION PRN
Status: DISCONTINUED | OUTPATIENT
Start: 2024-12-26 | End: 2024-12-31 | Stop reason: HOSPADM

## 2024-12-26 RX ORDER — HEPARIN SODIUM 1000 [USP'U]/ML
40 INJECTION, SOLUTION INTRAVENOUS; SUBCUTANEOUS PRN
Status: DISCONTINUED | OUTPATIENT
Start: 2024-12-26 | End: 2024-12-28

## 2024-12-26 RX ORDER — VANCOMYCIN 1.5 G/300ML
20 INJECTION, SOLUTION INTRAVENOUS ONCE
Status: DISCONTINUED | OUTPATIENT
Start: 2024-12-26 | End: 2024-12-26 | Stop reason: RX

## 2024-12-26 RX ORDER — BUMETANIDE 2 MG/1
2 TABLET ORAL DAILY
Status: ON HOLD | COMMUNITY
Start: 2024-12-18 | End: 2024-12-31 | Stop reason: HOSPADM

## 2024-12-26 RX ORDER — ONDANSETRON 4 MG/1
4 TABLET, ORALLY DISINTEGRATING ORAL EVERY 8 HOURS PRN
Status: DISCONTINUED | OUTPATIENT
Start: 2024-12-26 | End: 2024-12-31 | Stop reason: HOSPADM

## 2024-12-26 RX ORDER — 0.9 % SODIUM CHLORIDE 0.9 %
500 INTRAVENOUS SOLUTION INTRAVENOUS ONCE
Status: COMPLETED | OUTPATIENT
Start: 2024-12-26 | End: 2024-12-26

## 2024-12-26 RX ORDER — POTASSIUM CHLORIDE 1500 MG/1
40 TABLET, EXTENDED RELEASE ORAL PRN
Status: DISCONTINUED | OUTPATIENT
Start: 2024-12-26 | End: 2024-12-31 | Stop reason: HOSPADM

## 2024-12-26 RX ORDER — PRIMIDONE 50 MG/1
100 TABLET ORAL NIGHTLY
Status: DISCONTINUED | OUTPATIENT
Start: 2024-12-26 | End: 2024-12-31 | Stop reason: HOSPADM

## 2024-12-26 RX ORDER — POTASSIUM CHLORIDE 7.45 MG/ML
10 INJECTION INTRAVENOUS PRN
Status: DISCONTINUED | OUTPATIENT
Start: 2024-12-26 | End: 2024-12-31 | Stop reason: HOSPADM

## 2024-12-26 RX ORDER — CLOBETASOL PROPIONATE 0.5 MG/G
OINTMENT TOPICAL
COMMUNITY
Start: 2024-12-12

## 2024-12-26 RX ORDER — HEPARIN SODIUM 1000 [USP'U]/ML
80 INJECTION, SOLUTION INTRAVENOUS; SUBCUTANEOUS PRN
Status: DISCONTINUED | OUTPATIENT
Start: 2024-12-26 | End: 2024-12-28

## 2024-12-26 RX ORDER — FOLIC ACID 1 MG/1
1 TABLET ORAL DAILY
Status: DISCONTINUED | OUTPATIENT
Start: 2024-12-27 | End: 2024-12-31 | Stop reason: HOSPADM

## 2024-12-26 RX ORDER — PEGFILGRASTIM 6 MG/.6ML
INJECTION SUBCUTANEOUS
COMMUNITY
Start: 2024-12-12

## 2024-12-26 RX ORDER — ACETAMINOPHEN 650 MG/1
650 SUPPOSITORY RECTAL EVERY 6 HOURS PRN
Status: DISCONTINUED | OUTPATIENT
Start: 2024-12-26 | End: 2024-12-31 | Stop reason: HOSPADM

## 2024-12-26 RX ORDER — SODIUM CHLORIDE 9 MG/ML
INJECTION, SOLUTION INTRAVENOUS PRN
Status: DISCONTINUED | OUTPATIENT
Start: 2024-12-26 | End: 2024-12-31 | Stop reason: HOSPADM

## 2024-12-26 RX ADMIN — VANCOMYCIN HYDROCHLORIDE 1500 MG: 10 INJECTION, POWDER, LYOPHILIZED, FOR SOLUTION INTRAVENOUS at 18:37

## 2024-12-26 RX ADMIN — HEPARIN SODIUM 5700 UNITS: 1000 INJECTION INTRAVENOUS; SUBCUTANEOUS at 20:25

## 2024-12-26 RX ADMIN — IOPAMIDOL 75 ML: 755 INJECTION, SOLUTION INTRAVENOUS at 14:44

## 2024-12-26 RX ADMIN — PRIMIDONE 100 MG: 50 TABLET ORAL at 22:55

## 2024-12-26 RX ADMIN — PETROLATUM: 420 OINTMENT TOPICAL at 22:37

## 2024-12-26 RX ADMIN — ACETAMINOPHEN 650 MG: 325 TABLET ORAL at 22:44

## 2024-12-26 RX ADMIN — HEPARIN SODIUM 18 UNITS/KG/HR: 10000 INJECTION, SOLUTION INTRAVENOUS at 20:24

## 2024-12-26 RX ADMIN — CEFEPIME 2000 MG: 2 INJECTION, POWDER, FOR SOLUTION INTRAVENOUS at 16:47

## 2024-12-26 RX ADMIN — SODIUM CHLORIDE 500 ML: 9 INJECTION, SOLUTION INTRAVENOUS at 20:29

## 2024-12-26 RX ADMIN — GABAPENTIN 300 MG: 300 CAPSULE ORAL at 22:38

## 2024-12-26 NOTE — PROGRESS NOTES
Database initiated pharmacy and medications verified with the patient. She is A&O independent from home alone. She has a right chest port and follows with [facility].

## 2024-12-26 NOTE — ED PROVIDER NOTES
[facility] EMERGENCY DEPARTMENT  EMERGENCY DEPARTMENT ENCOUNTER        Pt Name: Cammy Lopez  MRN: [id number]  Birthdate 1955  Date of evaluation: 12/26/2024  Provider: Ramonita Hahn DO  PCP: Jovani Santos DO  Note Started: 12:22 PM EST 12/26/24    CHIEF COMPLAINT       Chief Complaint   Patient presents with    Shortness of Breath     sob. gotten worse since last weekend when seen. Last chemo was dec 12th. Recent dx with pleural effusion     Leg Swelling     Bilat. On daily diuretic .Worsening over past couple of days       HISTORY OF PRESENT ILLNESS: 1 or more Elements   History From: Patient and daughter    Limitations to history : None    Cammy Lopez is a 69 y.o. female who presents with concern for worsening shortness of breath.  She has been on chemotherapy as she does have metastatic cancer in her brain, bone, lung.  She is on anticoagulation.  She is beginning persistently short of breath since Saturday, she has had a pleural effusion that has required draining in the past.  She is having worsening swelling in her lower extremities and does take Lasix regularly.  No chest pain but she has been persistently short of breath with a wet cough.  No other associations.      EXTERNAL NOTE REVIEW:      On chart view last radiation oncology on 10/15/2024.  She does have stage IV adenocarcinoma of her lung with metastases.    REVIEW OF SYSTEMS :      Positives and Pertinent negatives as per HPI.     SURGICAL HISTORY     Past Surgical History:   Procedure Laterality Date    BRONCHOSCOPY N/A 3/7/2023    BRONCHOSCOPY ENDOBRONCHIAL ULTRASOUND, TBB, FNA,TBL, CYTOLOGY performed by [facility] Keen [place] [facility]    BRONCHOSCOPY N/A 3/7/2023    BRONCHOSCOPY BRUSHINGS performed by Raj Keen MD at [facility]    BRONCHOSCOPY  3/7/2023    BRONCHOSCOPY W/EBUS FNA performed by Raj Keen MD at [facility]    BRONCHOSCOPY  3/7/2023

## 2024-12-27 ENCOUNTER — APPOINTMENT (OUTPATIENT)
Age: 69
DRG: 175 | End: 2024-12-27
Attending: INTERNAL MEDICINE
Payer: COMMERCIAL

## 2024-12-27 ENCOUNTER — APPOINTMENT (OUTPATIENT)
Dept: ULTRASOUND IMAGING | Age: 69
DRG: 175 | End: 2024-12-27
Payer: COMMERCIAL

## 2024-12-27 ENCOUNTER — APPOINTMENT (OUTPATIENT)
Dept: GENERAL RADIOLOGY | Age: 69
DRG: 175 | End: 2024-12-27
Payer: COMMERCIAL

## 2024-12-27 LAB
ALBUMIN SERPL-MCNC: 2.4 G/DL (ref 3.5–5.2)
ALP SERPL-CCNC: 126 U/L (ref 35–104)
ALT SERPL-CCNC: 10 U/L (ref 0–32)
ANION GAP SERPL CALCULATED.3IONS-SCNC: 8 MMOL/L (ref 7–16)
APPEARANCE FLD: NORMAL
AST SERPL-CCNC: 12 U/L (ref 0–31)
BASOPHILS # BLD: 0.08 K/UL (ref 0–0.2)
BASOPHILS NFR BLD: 0 % (ref 0–2)
BILIRUB SERPL-MCNC: 0.2 MG/DL (ref 0–1.2)
BODY FLD TYPE: NORMAL
BUN SERPL-MCNC: 12 MG/DL (ref 6–23)
CALCIUM SERPL-MCNC: 7 MG/DL (ref 8.6–10.2)
CHLORIDE SERPL-SCNC: 103 MMOL/L (ref 98–107)
CHOLESTEROL FLUID: 90 MG/DL
CLOT CHECK: NORMAL
CO2 SERPL-SCNC: 25 MMOL/L (ref 22–29)
COLOR FLD: YELLOW
CREAT SERPL-MCNC: 0.8 MG/DL (ref 0.5–1)
EOSINOPHIL # BLD: 0.06 K/UL (ref 0.05–0.5)
EOSINOPHILS RELATIVE PERCENT: 0 % (ref 0–6)
ERYTHROCYTE [DISTWIDTH] IN BLOOD BY AUTOMATED COUNT: 15.4 % (ref 11.5–15)
FOLATE SERPL-MCNC: >20 NG/ML (ref 4.8–24.2)
GFR, ESTIMATED: 81 ML/MIN/1.73M2
GLUCOSE FLD-MCNC: 64 MG/DL
GLUCOSE SERPL-MCNC: 122 MG/DL (ref 74–99)
HCT VFR BLD AUTO: 31 % (ref 34–48)
HGB BLD-MCNC: 9.5 G/DL (ref 11.5–15.5)
IMM GRANULOCYTES # BLD AUTO: 0.65 K/UL (ref 0–0.58)
IMM GRANULOCYTES NFR BLD: 2 % (ref 0–5)
INR PPP: 1.1
LDH FLD L TO P-CCNC: 1571 U/L
LYMPHOCYTES NFR BLD: 1.86 K/UL (ref 1.5–4)
LYMPHOCYTES RELATIVE PERCENT: 6 % (ref 20–42)
MCH RBC QN AUTO: 32.4 PG (ref 26–35)
MCHC RBC AUTO-ENTMCNC: 30.6 G/DL (ref 32–34.5)
MCV RBC AUTO: 105.8 FL (ref 80–99.9)
MONOCYTES NFR BLD: 1.86 K/UL (ref 0.1–0.95)
MONOCYTES NFR BLD: 6 % (ref 2–12)
MONOCYTES NFR FLD: 8 %
NEUTROPHILS NFR BLD: 85 % (ref 43–80)
NEUTROPHILS NFR FLD: 92 %
NEUTS SEG NFR BLD: 25.23 K/UL (ref 1.8–7.3)
PARTIAL THROMBOPLASTIN TIME: 29.9 SEC (ref 24.5–35.1)
PARTIAL THROMBOPLASTIN TIME: 31.6 SEC (ref 24.5–35.1)
PARTIAL THROMBOPLASTIN TIME: 54.5 SEC (ref 24.5–35.1)
PARTIAL THROMBOPLASTIN TIME: 68.9 SEC (ref 24.5–35.1)
PLATELET # BLD AUTO: 245 K/UL (ref 130–450)
PMV BLD AUTO: 10 FL (ref 7–12)
POTASSIUM SERPL-SCNC: 4.4 MMOL/L (ref 3.5–5)
PROT FLD-MCNC: 3.4 G/DL
PROT SERPL-MCNC: 5.5 G/DL (ref 6.4–8.3)
PROTHROMBIN TIME: 11.8 SEC (ref 9.3–12.4)
RBC # BLD AUTO: 2.93 M/UL (ref 3.5–5.5)
RBC # BLD: ABNORMAL 10*6/UL
RBC # FLD: 4000 CELLS/UL
SODIUM SERPL-SCNC: 136 MMOL/L (ref 132–146)
SPECIMEN TYPE: NORMAL
VIT B12 SERPL-MCNC: >2000 PG/ML (ref 211–946)
WBC # FLD: NORMAL CELLS/UL
WBC OTHER # BLD: 29.7 K/UL (ref 4.5–11.5)

## 2024-12-27 PROCEDURE — 2060000000 HC ICU INTERMEDIATE R&B

## 2024-12-27 PROCEDURE — 2700000000 HC OXYGEN THERAPY PER DAY

## 2024-12-27 PROCEDURE — 80053 COMPREHEN METABOLIC PANEL: CPT

## 2024-12-27 PROCEDURE — 99233 SBSQ HOSP IP/OBS HIGH 50: CPT | Performed by: INTERNAL MEDICINE

## 2024-12-27 PROCEDURE — 36415 COLL VENOUS BLD VENIPUNCTURE: CPT

## 2024-12-27 PROCEDURE — C1729 CATH, DRAINAGE: HCPCS

## 2024-12-27 PROCEDURE — 84157 ASSAY OF PROTEIN OTHER: CPT

## 2024-12-27 PROCEDURE — 6370000000 HC RX 637 (ALT 250 FOR IP): Performed by: STUDENT IN AN ORGANIZED HEALTH CARE EDUCATION/TRAINING PROGRAM

## 2024-12-27 PROCEDURE — 6360000002 HC RX W HCPCS: Performed by: EMERGENCY MEDICINE

## 2024-12-27 PROCEDURE — P9047 ALBUMIN (HUMAN), 25%, 50ML: HCPCS | Performed by: NURSE PRACTITIONER

## 2024-12-27 PROCEDURE — 6370000000 HC RX 637 (ALT 250 FOR IP): Performed by: INTERNAL MEDICINE

## 2024-12-27 PROCEDURE — 85610 PROTHROMBIN TIME: CPT

## 2024-12-27 PROCEDURE — 93306 TTE W/DOPPLER COMPLETE: CPT

## 2024-12-27 PROCEDURE — 93970 EXTREMITY STUDY: CPT

## 2024-12-27 PROCEDURE — 87205 SMEAR GRAM STAIN: CPT

## 2024-12-27 PROCEDURE — 85730 THROMBOPLASTIN TIME PARTIAL: CPT

## 2024-12-27 PROCEDURE — 6360000002 HC RX W HCPCS: Performed by: STUDENT IN AN ORGANIZED HEALTH CARE EDUCATION/TRAINING PROGRAM

## 2024-12-27 PROCEDURE — 87899 AGENT NOS ASSAY W/OPTIC: CPT

## 2024-12-27 PROCEDURE — 87070 CULTURE OTHR SPECIMN AEROBIC: CPT

## 2024-12-27 PROCEDURE — 82746 ASSAY OF FOLIC ACID SERUM: CPT

## 2024-12-27 PROCEDURE — 71045 X-RAY EXAM CHEST 1 VIEW: CPT

## 2024-12-27 PROCEDURE — 2580000003 HC RX 258: Performed by: INTERNAL MEDICINE

## 2024-12-27 PROCEDURE — 87449 NOS EACH ORGANISM AG IA: CPT

## 2024-12-27 PROCEDURE — 86403 PARTICLE AGGLUT ANTBDY SCRN: CPT

## 2024-12-27 PROCEDURE — 6360000002 HC RX W HCPCS: Performed by: PHYSICIAN ASSISTANT

## 2024-12-27 PROCEDURE — 2500000003 HC RX 250 WO HCPCS: Performed by: STUDENT IN AN ORGANIZED HEALTH CARE EDUCATION/TRAINING PROGRAM

## 2024-12-27 PROCEDURE — 0W9B30Z DRAINAGE OF LEFT PLEURAL CAVITY WITH DRAINAGE DEVICE, PERCUTANEOUS APPROACH: ICD-10-PCS | Performed by: STUDENT IN AN ORGANIZED HEALTH CARE EDUCATION/TRAINING PROGRAM

## 2024-12-27 PROCEDURE — 6360000002 HC RX W HCPCS: Performed by: NURSE PRACTITIONER

## 2024-12-27 PROCEDURE — 85025 COMPLETE CBC W/AUTO DIFF WBC: CPT

## 2024-12-27 PROCEDURE — 88112 CYTOPATH CELL ENHANCE TECH: CPT

## 2024-12-27 PROCEDURE — 82465 ASSAY BLD/SERUM CHOLESTEROL: CPT

## 2024-12-27 PROCEDURE — 6360000002 HC RX W HCPCS: Performed by: INTERNAL MEDICINE

## 2024-12-27 PROCEDURE — 87081 CULTURE SCREEN ONLY: CPT

## 2024-12-27 PROCEDURE — 82607 VITAMIN B-12: CPT

## 2024-12-27 PROCEDURE — 83615 LACTATE (LD) (LDH) ENZYME: CPT

## 2024-12-27 PROCEDURE — 82945 GLUCOSE OTHER FLUID: CPT

## 2024-12-27 PROCEDURE — 88305 TISSUE EXAM BY PATHOLOGIST: CPT

## 2024-12-27 PROCEDURE — 2500000003 HC RX 250 WO HCPCS: Performed by: INTERNAL MEDICINE

## 2024-12-27 PROCEDURE — 89051 BODY FLUID CELL COUNT: CPT

## 2024-12-27 RX ORDER — ALBUMIN (HUMAN) 12.5 G/50ML
25 SOLUTION INTRAVENOUS DAILY
Status: COMPLETED | OUTPATIENT
Start: 2024-12-27 | End: 2024-12-28

## 2024-12-27 RX ORDER — FUROSEMIDE 20 MG/1
20 TABLET ORAL DAILY
Status: DISCONTINUED | OUTPATIENT
Start: 2024-12-27 | End: 2024-12-31 | Stop reason: HOSPADM

## 2024-12-27 RX ORDER — CALCIUM GLUCONATE 20 MG/ML
2000 INJECTION, SOLUTION INTRAVENOUS ONCE
Status: COMPLETED | OUTPATIENT
Start: 2024-12-27 | End: 2024-12-27

## 2024-12-27 RX ORDER — LIDOCAINE HYDROCHLORIDE 10 MG/ML
INJECTION, SOLUTION INFILTRATION; PERINEURAL PRN
Status: COMPLETED | OUTPATIENT
Start: 2024-12-27 | End: 2024-12-27

## 2024-12-27 RX ORDER — POTASSIUM CHLORIDE 750 MG/1
10 TABLET, EXTENDED RELEASE ORAL
Status: DISCONTINUED | OUTPATIENT
Start: 2024-12-27 | End: 2024-12-31 | Stop reason: HOSPADM

## 2024-12-27 RX ORDER — POTASSIUM CHLORIDE 750 MG/1
10 CAPSULE, EXTENDED RELEASE ORAL DAILY
Status: DISCONTINUED | OUTPATIENT
Start: 2024-12-27 | End: 2024-12-27 | Stop reason: CLARIF

## 2024-12-27 RX ORDER — FENTANYL 25 UG/1
1 PATCH TRANSDERMAL
Status: DISCONTINUED | OUTPATIENT
Start: 2024-12-27 | End: 2024-12-31 | Stop reason: HOSPADM

## 2024-12-27 RX ORDER — PREDNISONE 5 MG/1
10 TABLET ORAL
Status: DISCONTINUED | OUTPATIENT
Start: 2024-12-27 | End: 2024-12-31 | Stop reason: HOSPADM

## 2024-12-27 RX ADMIN — CALCIUM GLUCONATE 2000 MG: 20 INJECTION, SOLUTION INTRAVENOUS at 12:40

## 2024-12-27 RX ADMIN — FUROSEMIDE 20 MG: 20 TABLET ORAL at 11:58

## 2024-12-27 RX ADMIN — WATER 2000 MG: 1 INJECTION INTRAMUSCULAR; INTRAVENOUS; SUBCUTANEOUS at 09:26

## 2024-12-27 RX ADMIN — SODIUM CHLORIDE, PRESERVATIVE FREE 10 ML: 5 INJECTION INTRAVENOUS at 21:23

## 2024-12-27 RX ADMIN — HEPARIN SODIUM 17.43 UNITS/KG/HR: 10000 INJECTION, SOLUTION INTRAVENOUS at 22:47

## 2024-12-27 RX ADMIN — PREDNISONE 10 MG: 5 TABLET ORAL at 11:57

## 2024-12-27 RX ADMIN — FOLIC ACID 1 MG: 1 TABLET ORAL at 09:26

## 2024-12-27 RX ADMIN — ALBUMIN (HUMAN) 25 G: 0.25 INJECTION, SOLUTION INTRAVENOUS at 22:43

## 2024-12-27 RX ADMIN — GABAPENTIN 300 MG: 300 CAPSULE ORAL at 21:23

## 2024-12-27 RX ADMIN — POTASSIUM CHLORIDE 10 MEQ: 750 TABLET, EXTENDED RELEASE ORAL at 11:58

## 2024-12-27 RX ADMIN — LOSARTAN POTASSIUM 50 MG: 50 TABLET, FILM COATED ORAL at 09:24

## 2024-12-27 RX ADMIN — DOXYCYCLINE 100 MG: 100 INJECTION, POWDER, LYOPHILIZED, FOR SOLUTION INTRAVENOUS at 21:28

## 2024-12-27 RX ADMIN — SODIUM CHLORIDE, PRESERVATIVE FREE 10 ML: 5 INJECTION INTRAVENOUS at 09:30

## 2024-12-27 RX ADMIN — WATER 2000 MG: 1 INJECTION INTRAMUSCULAR; INTRAVENOUS; SUBCUTANEOUS at 17:18

## 2024-12-27 RX ADMIN — LIDOCAINE HYDROCHLORIDE 10 ML: 10 INJECTION, SOLUTION INFILTRATION; PERINEURAL at 14:46

## 2024-12-27 RX ADMIN — PRIMIDONE 100 MG: 50 TABLET ORAL at 21:23

## 2024-12-27 RX ADMIN — WATER 2000 MG: 1 INJECTION INTRAMUSCULAR; INTRAVENOUS; SUBCUTANEOUS at 01:55

## 2024-12-27 ASSESSMENT — PAIN DESCRIPTION - ORIENTATION: ORIENTATION: LOWER

## 2024-12-27 ASSESSMENT — PAIN DESCRIPTION - LOCATION: LOCATION: BACK

## 2024-12-27 ASSESSMENT — PAIN DESCRIPTION - DESCRIPTORS: DESCRIPTORS: DISCOMFORT;ACHING

## 2024-12-27 NOTE — PROCEDURES
Procedure: Ultrasound Guided left Thoracentesis    Diagnosis: Pleural Effusion    Findings: Pleural effusion, successful catheter placement with cloudy yellow pleural fluid return    Specimen: Approximately 50cc obtained and sent for analysis (orders from referring physician). Total amount of fluid removed 1100 ml.     Anesthesia: Local infiltration of 5 cc 1% Lidocaine without epi    EBL: Minimal.    Complication: None immediately post procedure.    Plan: Return to floor/room following thoracentesis.    Comments:    See radiology dictation in PACs for image review and additional procedural information.

## 2024-12-27 NOTE — PROGRESS NOTES
4 Eyes Skin Assessment     NAME:  Cammy Lopez  YOB: 1955  MEDICAL RECORD NUMBER:  [id number]    The patient is being assessed for  Admission    I agree that at least one RN has performed a thorough Head to Toe Skin Assessment on the patient. ALL assessment sites listed below have been assessed.      Areas assessed by both nurses:    Head, Face, Ears, Shoulders, Back, Chest, Arms, Elbows, Hands, Sacrum. Buttock, Coccyx, Ischium, Legs. Feet and Heels, and Under Medical Devices         Does the Patient have a Wound? No noted wound(s)       Hunter Prevention initiated by RN: No  Wound Care Orders initiated by RN: No    Pressure Injury (Stage 3,4, Unstageable, DTI, NWPT, and Complex wounds) if present, place Wound referral order by RN under : No    New Ostomies, if present place, Ostomy referral order under : No     Nurse 1 eSignature: Electronically signed by Penelope Monterroso RN on 12/26/24 at 11:56 PM EST    **SHARE this note so that the co-signing nurse can place an eSignature**    Nurse 2 eSignature: Electronically signed by GUSTAVO PARR RN on 12/26/24 at 11:59 PM EST

## 2024-12-27 NOTE — ED NOTES
ED to Inpatient Handoff Report    Notifie mingo that electronic handoff available and patient ready for transport to room 0633.    Safety Risks: Risk of falls    Patient in Restraints: no    Constant Observer or Patient : no    Telemetry Monitoring Ordered :Yes           Order to transfer to unit without monitor:N/A    Last MEWS: 2 Time completed: 2033    Deterioration Index Score:   Predictive Model Details          32 (Caution)  Factor Value    Calculated 12/26/2024 21:37 36% Respiratory rate 25    Deterioration Index Model 35% Age 69 years old     12% WBC count abnormal (32.3 k/uL)     7% Potassium 4.4 mmol/L     4% Pulse oximetry 100 %     3% Sodium 134 mmol/L     2% Systolic 107     1% Pulse 84     1% Hematocrit abnormal (32.9 %)     0% Temperature 97.9 °F (36.6 °C)        Vitals:    12/26/24 1726 12/26/24 1833 12/26/24 1933 12/26/24 2031   BP:  107/70 118/69 107/69   Pulse: 82 91 85 84   Resp: 27 25 25 25   Temp:    97.9 °F (36.6 °C)   TempSrc:    Oral   SpO2: 96% 94% 95% 100%   Height:             Opportunity for questions and clarification was provided.

## 2024-12-27 NOTE — CARE COORDINATION
Pt w/SOB w/PE, pneumonia, lung mass on a hep gtt, cefepime and doxy w/pulm consulted. Echo pending. IR to complete left thoracentesis. Pt w/lung cancer, [facility], last chemo 12/13. CM met w/pt w/role of CM explained. Pt resides alone and is independent w/o the use of any assistive devices, but has a cane if needed. No hx of HHC or SNF stay. No home O2, cpap or nebulizer. Pt is currently on 2L NC, goal is to wean. If unable to wean O2 pt has no preference of DME supplier w/referral to [facility], [phone]. Ambulatory pulse ox testing needed prior to discharge and orders if pt qualifies. Pt will return home upon discharge and has transportation. Will follow.  GAGAN PiersonN, RN  [facility] Case Management  [phone]

## 2024-12-27 NOTE — PLAN OF CARE
Problem: ABCDS Injury Assessment  Goal: Absence of physical injury  Outcome: Progressing  Flowsheets (Taken 12/27/2024 2569)  Absence of Physical Injury: Implement safety measures based on patient assessment

## 2024-12-27 NOTE — OR NURSING
Patient arrival from room (633 ) to radiology for (left ) thoracentesis. Vitals taken, Kortney Vogel PA-C in to speak with the patient about the procedure, all questions answered and patient is agreeable. Patient placed upright dangling at the side of the bed, images taken using ultrasound and reviewed. Site prepped and draped, catheter inserted to right posterior chest by Kortney Vogel PA-C . 1100 Ml of cloudy yellow colored pleural fluid drained. Procedure completed @ 15:00 , site cleansed and dry dressing applied. Specimen sent to lab per physician orders. Nurse handoff report called to floor, patient transported back to room .

## 2024-12-27 NOTE — PROGRESS NOTES
[facility] Quality Flow/Interdisciplinary Rounds Progress Note        Quality Flow Rounds held on December 27, 2024    Disciplines Attending:  Bedside Nurse, , , and Nursing Unit Leadership    Cammy Lopez was admitted on 12/26/2024 12:03 PM    Anticipated Discharge Date:       Disposition:    Hunter Score:  Hunter Scale Score: 20    [facility] RISK OF UNPLANNED READMISSION 2.0             16.3 Total Score        Discussed patient goal for the day, patient clinical progression, and barriers to discharge.  The following Goal(s) of the Day/Commitment(s) have been identified:   discharge planning, heparin gtt, pulm plan, thoracentesis      Lucien Mendoza RN  December 27, 2024

## 2024-12-27 NOTE — H&P
[facility] Hospitalist Group History and Physical      CHIEF COMPLAINT: Shortness of breath    History of Present Illness: 69-year-old lady with past medical history significant for hypothyroidism, metastatic neoplasm of lung, primary hypertension, history of pleural effusion presented to ED with worsening shortness of breath.  She has adenocarcinoma of the left lung metastatic to brain and bone, and has been on chemotherapy.  She does have history of pleural effusion in the past requiring thoracentesis.  As her shortness of breath worsened over the last few days she came to ED to get checked.  Denies any chest pain, belly pain, nausea vomiting diarrhea, no lightheadedness or dizziness, no fever or chills, no focal deficits.  During workup in ED she was found to have left upper lobe pulmonary embolism along with left-sided pleural effusion.  Imaging also showed community-acquired pneumonia.  Decision to admit.    Informant(s) for H&P:    REVIEW OF SYSTEMS:  A comprehensive review of systems was negative except for: what is in the HPI      PMH:  Past Medical History:   Diagnosis Date    Hypertension     Lacrimal duct stenosis, bilateral 12/2023    S/p endoscopic DCR at [facility].    Malignant neoplasm of lower lobe of left lung (HCC) 02/14/2023    Adenocarcinoma     Secondary cancer of bone (HCC) 03/31/2023    Right sacral mass bone biopsy: metastatic adenocarcinoma.     Secondary cancer of brain (HCC) 03/22/2023    Secondary cancer of brain (HCC) 03/07/2024    2 new CNS metastases on MRI Brain.    Status post stereotactic radiosurgery 04/18/2023    SRS to right lateral frontal met, 2400 cGy/ 1 fraction.    Status post stereotactic radiosurgery 04/20/2023    SRS to right high posterior frontal met, 2400 cGy/ 1 fraction.    Status post stereotactic radiosurgery 04/26/2023    SRS to right sacrum bone met, 2400 cGy/ 4 fractions completed 05/01/2023.    Status post stereotactic radiosurgery 03/29/2024

## 2024-12-27 NOTE — PROGRESS NOTES
[facility] Hospitalist Progress Note    Admitting Date and Time: 12/26/2024 12:03 PM  Admit Dx: Lung mass [R91.8]  Pleural effusion on left [J90]  Leukocytosis, unspecified type [D72.829]  Pneumonia due to infectious organism, unspecified laterality, unspecified part of lung [J18.9]  Acute pulmonary embolism without acute cor pulmonale, unspecified pulmonary embolism type (HCC) [I26.99]  Pulmonary embolism without acute cor pulmonale, unspecified chronicity, unspecified pulmonary embolism type (HCC) [I26.99]    Subjective:  Patient is being followed for Lung mass [R91.8]  Pleural effusion on left [J90]  Leukocytosis, unspecified type [D72.829]  Pneumonia due to infectious organism, unspecified laterality, unspecified part of lung [J18.9]  Acute pulmonary embolism without acute cor pulmonale, unspecified pulmonary embolism type (HCC) [I26.99]  Pulmonary embolism without acute cor pulmonale, unspecified chronicity, unspecified pulmonary embolism type (HCC) [I26.99]     No acute events overnight    ROS: denies fever, chills, cp, sob, n/v, HA unless stated above.      vancomycin (VANCOCIN) 1,500 mg in sodium chloride 0.9 % 250 mL IVPB  1,500 mg IntraVENous Q12H    sodium chloride flush  5-40 mL IntraVENous 2 times per day    ceFEPIme (MAXIPIME) 2,000 mg in sterile water 20 mL IV syringe  2,000 mg IntraVENous Q8H    escitalopram  20 mg Oral Daily    folic acid  1 mg Oral Daily    gabapentin  300 mg Oral Nightly    losartan  50 mg Oral Daily    primidone  100 mg Oral Nightly     heparin (porcine), 80 Units/kg, PRN  heparin (porcine), 40 Units/kg, PRN  sodium chloride flush, 5-40 mL, PRN  sodium chloride, , PRN  potassium chloride, 40 mEq, PRN   Or  potassium alternative oral replacement, 40 mEq, PRN   Or  potassium chloride, 10 mEq, PRN  magnesium sulfate, 2,000 mg, PRN  ondansetron, 4 mg, Q8H PRN   Or  ondansetron, 4 mg, Q6H PRN  polyethylene glycol, 17 g, Daily PRN  acetaminophen, 650 mg, Q6H PRN

## 2024-12-27 NOTE — PROGRESS NOTES
Spiritual Health History and Assessment/Progress Note  [facility] Cammy Choman    Initial Encounter, Attempted Encounter,  ,  ,      Name: Cammy Lopez MRN: [id number]    Age: 69 y.o.     Sex: female   Language: English   Shinto: None   Acute pulmonary embolism without acute cor pulmonale, unspecified pulmonary embolism type (HCC)     Date: 12/27/2024                           Spiritual Assessment began in [facility] 6S INTERMEDIATE        Referral/Consult From: Rounding   Encounter Overview/Reason: Initial Encounter, Attempted Encounter  Service Provided For: Patient, Patient not available    Cecilia, Belief, Meaning:   Patient unable to assess at this time  Family/Friends No family/friends present      Importance and Influence:  Patient unable to assess at this time  Family/Friends No family/friends present    Community:  Patient feels well-supported. Support system includes: Extended family  Family/Friends No family/friends present    Assessment and Plan of Care:     Patient Interventions include: Other: Offered prayer for the patient outside the room as the patient is engaged in testing at the bedside  Family/Friends Interventions include: No family/friends present    Patient Plan of Care: Spiritual Care available upon further referral  Family/Friends Plan of Care: No family/friends present    Electronically signed by Chaplain Moe on 12/27/2024 at 11:40 AM

## 2024-12-28 ENCOUNTER — APPOINTMENT (OUTPATIENT)
Dept: GENERAL RADIOLOGY | Age: 69
DRG: 175 | End: 2024-12-28
Payer: COMMERCIAL

## 2024-12-28 LAB
ALBUMIN SERPL-MCNC: 2.8 G/DL (ref 3.5–5.2)
ALP SERPL-CCNC: 130 U/L (ref 35–104)
ALT SERPL-CCNC: 9 U/L (ref 0–32)
ANION GAP SERPL CALCULATED.3IONS-SCNC: 8 MMOL/L (ref 7–16)
AST SERPL-CCNC: 12 U/L (ref 0–31)
BASOPHILS # BLD: 0.05 K/UL (ref 0–0.2)
BASOPHILS NFR BLD: 0 % (ref 0–2)
BILIRUB SERPL-MCNC: 0.3 MG/DL (ref 0–1.2)
BUN SERPL-MCNC: 12 MG/DL (ref 6–23)
CALCIUM SERPL-MCNC: 7.9 MG/DL (ref 8.6–10.2)
CHLORIDE SERPL-SCNC: 105 MMOL/L (ref 98–107)
CO2 SERPL-SCNC: 24 MMOL/L (ref 22–29)
CREAT SERPL-MCNC: 0.7 MG/DL (ref 0.5–1)
CRP SERPL HS-MCNC: 158 MG/L (ref 0–5)
ECHO AO ROOT DIAM: 2.7 CM
ECHO AO ROOT INDEX: 1.51 CM/M2
ECHO AO SINUS VALSALVA DIAM: 2.7 CM
ECHO AO SINUS VALSALVA INDEX: 1.51 CM/M2
ECHO AV AREA PEAK VELOCITY: 2.1 CM2
ECHO AV AREA VTI: 2.2 CM2
ECHO AV AREA/BSA PEAK VELOCITY: 1.2 CM2/M2
ECHO AV AREA/BSA VTI: 1.2 CM2/M2
ECHO AV CUSP MM: 1.7 CM
ECHO AV MEAN GRADIENT: 7 MMHG
ECHO AV MEAN VELOCITY: 1.2 M/S
ECHO AV PEAK GRADIENT: 14 MMHG
ECHO AV PEAK VELOCITY: 1.9 M/S
ECHO AV VELOCITY RATIO: 0.68
ECHO AV VTI: 32.6 CM
ECHO EST RA PRESSURE: 8 MMHG
ECHO LA DIAMETER INDEX: 2.18 CM/M2
ECHO LA DIAMETER: 3.9 CM
ECHO LA TO AORTIC ROOT RATIO: 1.44
ECHO LA VOL A-L A2C: 49 ML (ref 22–52)
ECHO LA VOL A-L A4C: 44 ML (ref 22–52)
ECHO LA VOL MOD A2C: 46 ML (ref 22–52)
ECHO LA VOL MOD A4C: 42 ML (ref 22–52)
ECHO LA VOLUME AREA LENGTH: 50 ML
ECHO LA VOLUME INDEX A-L A2C: 27 ML/M2 (ref 16–34)
ECHO LA VOLUME INDEX A-L A4C: 25 ML/M2 (ref 16–34)
ECHO LA VOLUME INDEX AREA LENGTH: 28 ML/M2 (ref 16–34)
ECHO LA VOLUME INDEX MOD A2C: 26 ML/M2 (ref 16–34)
ECHO LA VOLUME INDEX MOD A4C: 23 ML/M2 (ref 16–34)
ECHO LV E' LATERAL VELOCITY: 11 CM/S
ECHO LV E' SEPTAL VELOCITY: 6 CM/S
ECHO LV EJECTION FRACTION BIPLANE: 60 % (ref 55–100)
ECHO LV FRACTIONAL SHORTENING: 39 % (ref 28–44)
ECHO LV INTERNAL DIMENSION DIASTOLE INDEX: 3.02 CM/M2
ECHO LV INTERNAL DIMENSION DIASTOLIC: 5.4 CM (ref 3.9–5.3)
ECHO LV INTERNAL DIMENSION SYSTOLIC INDEX: 1.84 CM/M2
ECHO LV INTERNAL DIMENSION SYSTOLIC: 3.3 CM
ECHO LV IVSD: 1.2 CM (ref 0.6–0.9)
ECHO LV IVSS: 1.4 CM
ECHO LV MASS 2D: 264.4 G (ref 67–162)
ECHO LV MASS INDEX 2D: 147.7 G/M2 (ref 43–95)
ECHO LV POSTERIOR WALL DIASTOLIC: 1.2 CM (ref 0.6–0.9)
ECHO LV POSTERIOR WALL SYSTOLIC: 1.4 CM
ECHO LV RELATIVE WALL THICKNESS RATIO: 0.44
ECHO LVOT AREA: 3.1 CM2
ECHO LVOT AV VTI INDEX: 0.7
ECHO LVOT DIAM: 2 CM
ECHO LVOT MEAN GRADIENT: 4 MMHG
ECHO LVOT PEAK GRADIENT: 7 MMHG
ECHO LVOT PEAK VELOCITY: 1.3 M/S
ECHO LVOT STROKE VOLUME INDEX: 40.2 ML/M2
ECHO LVOT SV: 71.9 ML
ECHO LVOT VTI: 22.9 CM
ECHO MV "A" WAVE DURATION: 92.3 MSEC
ECHO MV A VELOCITY: 0.95 M/S
ECHO MV AREA PHT: 3.5 CM2
ECHO MV AREA VTI: 3 CM2
ECHO MV E DECELERATION TIME (DT): 261.6 MS
ECHO MV E VELOCITY: 0.84 M/S
ECHO MV E/A RATIO: 0.88
ECHO MV E/E' LATERAL: 7.64
ECHO MV E/E' RATIO (AVERAGED): 10.82
ECHO MV E/E' SEPTAL: 14
ECHO MV LVOT VTI INDEX: 1.06
ECHO MV MAX VELOCITY: 1.2 M/S
ECHO MV MEAN GRADIENT: 2 MMHG
ECHO MV MEAN VELOCITY: 0.6 M/S
ECHO MV PEAK GRADIENT: 6 MMHG
ECHO MV PRESSURE HALF TIME (PHT): 63.7 MS
ECHO MV VTI: 24.3 CM
ECHO PV MAX VELOCITY: 1 M/S
ECHO PV MEAN GRADIENT: 2 MMHG
ECHO PV MEAN VELOCITY: 0.6 M/S
ECHO PV PEAK GRADIENT: 4 MMHG
ECHO PV VTI: 13.4 CM
ECHO RIGHT VENTRICULAR SYSTOLIC PRESSURE (RVSP): 47 MMHG
ECHO RV INTERNAL DIMENSION: 3.2 CM
ECHO RV TAPSE: 2.2 CM (ref 1.7–?)
ECHO TV REGURGITANT MAX VELOCITY: 3.13 M/S
ECHO TV REGURGITANT PEAK GRADIENT: 39 MMHG
EOSINOPHIL # BLD: 0.04 K/UL (ref 0.05–0.5)
EOSINOPHILS RELATIVE PERCENT: 0 % (ref 0–6)
ERYTHROCYTE [DISTWIDTH] IN BLOOD BY AUTOMATED COUNT: 15.3 % (ref 11.5–15)
ERYTHROCYTE [SEDIMENTATION RATE] IN BLOOD BY WESTERGREN METHOD: 110 MM/HR (ref 0–20)
GFR, ESTIMATED: >90 ML/MIN/1.73M2
GLUCOSE SERPL-MCNC: 115 MG/DL (ref 74–99)
HCT VFR BLD AUTO: 29.5 % (ref 34–48)
HGB BLD-MCNC: 9.5 G/DL (ref 11.5–15.5)
IMM GRANULOCYTES # BLD AUTO: 0.35 K/UL (ref 0–0.58)
IMM GRANULOCYTES NFR BLD: 2 % (ref 0–5)
L PNEUMO1 AG UR QL IA.RAPID: NEGATIVE
LYMPHOCYTES NFR BLD: 1.48 K/UL (ref 1.5–4)
LYMPHOCYTES RELATIVE PERCENT: 7 % (ref 20–42)
MCH RBC QN AUTO: 32.9 PG (ref 26–35)
MCHC RBC AUTO-ENTMCNC: 32.2 G/DL (ref 32–34.5)
MCV RBC AUTO: 102.1 FL (ref 80–99.9)
MONOCYTES NFR BLD: 1.56 K/UL (ref 0.1–0.95)
MONOCYTES NFR BLD: 7 % (ref 2–12)
NEUTROPHILS NFR BLD: 84 % (ref 43–80)
NEUTS SEG NFR BLD: 18.75 K/UL (ref 1.8–7.3)
PARTIAL THROMBOPLASTIN TIME: 48.1 SEC (ref 24.5–35.1)
PLATELET # BLD AUTO: 262 K/UL (ref 130–450)
PMV BLD AUTO: 10.1 FL (ref 7–12)
POTASSIUM SERPL-SCNC: 4.2 MMOL/L (ref 3.5–5)
PROT SERPL-MCNC: 5.8 G/DL (ref 6.4–8.3)
RBC # BLD AUTO: 2.89 M/UL (ref 3.5–5.5)
RBC # BLD: ABNORMAL 10*6/UL
S PNEUM AG SPEC QL: NEGATIVE
SODIUM SERPL-SCNC: 137 MMOL/L (ref 132–146)
SPECIMEN SOURCE: NORMAL
WBC OTHER # BLD: 22.2 K/UL (ref 4.5–11.5)

## 2024-12-28 PROCEDURE — 6370000000 HC RX 637 (ALT 250 FOR IP): Performed by: STUDENT IN AN ORGANIZED HEALTH CARE EDUCATION/TRAINING PROGRAM

## 2024-12-28 PROCEDURE — 6360000002 HC RX W HCPCS: Performed by: EMERGENCY MEDICINE

## 2024-12-28 PROCEDURE — 6370000000 HC RX 637 (ALT 250 FOR IP): Performed by: INTERNAL MEDICINE

## 2024-12-28 PROCEDURE — 80053 COMPREHEN METABOLIC PANEL: CPT

## 2024-12-28 PROCEDURE — 85025 COMPLETE CBC W/AUTO DIFF WBC: CPT

## 2024-12-28 PROCEDURE — 2500000003 HC RX 250 WO HCPCS: Performed by: STUDENT IN AN ORGANIZED HEALTH CARE EDUCATION/TRAINING PROGRAM

## 2024-12-28 PROCEDURE — 99233 SBSQ HOSP IP/OBS HIGH 50: CPT | Performed by: INTERNAL MEDICINE

## 2024-12-28 PROCEDURE — 2500000003 HC RX 250 WO HCPCS: Performed by: INTERNAL MEDICINE

## 2024-12-28 PROCEDURE — 71045 X-RAY EXAM CHEST 1 VIEW: CPT

## 2024-12-28 PROCEDURE — 93306 TTE W/DOPPLER COMPLETE: CPT | Performed by: INTERNAL MEDICINE

## 2024-12-28 PROCEDURE — 86140 C-REACTIVE PROTEIN: CPT

## 2024-12-28 PROCEDURE — 6360000002 HC RX W HCPCS: Performed by: NURSE PRACTITIONER

## 2024-12-28 PROCEDURE — 85652 RBC SED RATE AUTOMATED: CPT

## 2024-12-28 PROCEDURE — 86063 ANTISTREPTOLYSIN O SCREEN: CPT

## 2024-12-28 PROCEDURE — 2580000003 HC RX 258: Performed by: INTERNAL MEDICINE

## 2024-12-28 PROCEDURE — 6360000002 HC RX W HCPCS: Performed by: INTERNAL MEDICINE

## 2024-12-28 PROCEDURE — 2060000000 HC ICU INTERMEDIATE R&B

## 2024-12-28 PROCEDURE — 85730 THROMBOPLASTIN TIME PARTIAL: CPT

## 2024-12-28 PROCEDURE — 2700000000 HC OXYGEN THERAPY PER DAY

## 2024-12-28 PROCEDURE — 6360000002 HC RX W HCPCS: Performed by: STUDENT IN AN ORGANIZED HEALTH CARE EDUCATION/TRAINING PROGRAM

## 2024-12-28 PROCEDURE — P9047 ALBUMIN (HUMAN), 25%, 50ML: HCPCS | Performed by: NURSE PRACTITIONER

## 2024-12-28 RX ORDER — ENOXAPARIN SODIUM 100 MG/ML
1 INJECTION SUBCUTANEOUS 2 TIMES DAILY
Status: DISCONTINUED | OUTPATIENT
Start: 2024-12-28 | End: 2024-12-28

## 2024-12-28 RX ADMIN — FUROSEMIDE 20 MG: 20 TABLET ORAL at 05:47

## 2024-12-28 RX ADMIN — POTASSIUM CHLORIDE 10 MEQ: 750 TABLET, EXTENDED RELEASE ORAL at 08:10

## 2024-12-28 RX ADMIN — SODIUM CHLORIDE, PRESERVATIVE FREE 10 ML: 5 INJECTION INTRAVENOUS at 20:54

## 2024-12-28 RX ADMIN — DOXYCYCLINE 100 MG: 100 INJECTION, POWDER, LYOPHILIZED, FOR SOLUTION INTRAVENOUS at 22:02

## 2024-12-28 RX ADMIN — LOSARTAN POTASSIUM 50 MG: 50 TABLET, FILM COATED ORAL at 08:10

## 2024-12-28 RX ADMIN — DOXYCYCLINE 100 MG: 100 INJECTION, POWDER, LYOPHILIZED, FOR SOLUTION INTRAVENOUS at 08:26

## 2024-12-28 RX ADMIN — ENOXAPARIN SODIUM 70 MG: 100 INJECTION SUBCUTANEOUS at 09:40

## 2024-12-28 RX ADMIN — ESCITALOPRAM OXALATE 20 MG: 10 TABLET ORAL at 08:10

## 2024-12-28 RX ADMIN — APIXABAN 10 MG: 5 TABLET, FILM COATED ORAL at 20:30

## 2024-12-28 RX ADMIN — PREDNISONE 10 MG: 5 TABLET ORAL at 08:10

## 2024-12-28 RX ADMIN — WATER 2000 MG: 1 INJECTION INTRAMUSCULAR; INTRAVENOUS; SUBCUTANEOUS at 16:31

## 2024-12-28 RX ADMIN — WATER 2000 MG: 1 INJECTION INTRAMUSCULAR; INTRAVENOUS; SUBCUTANEOUS at 08:13

## 2024-12-28 RX ADMIN — ALBUMIN (HUMAN) 25 G: 0.25 INJECTION, SOLUTION INTRAVENOUS at 20:28

## 2024-12-28 RX ADMIN — WATER 2000 MG: 1 INJECTION INTRAMUSCULAR; INTRAVENOUS; SUBCUTANEOUS at 01:07

## 2024-12-28 RX ADMIN — GABAPENTIN 300 MG: 300 CAPSULE ORAL at 20:53

## 2024-12-28 RX ADMIN — SODIUM CHLORIDE, PRESERVATIVE FREE 10 ML: 5 INJECTION INTRAVENOUS at 08:13

## 2024-12-28 RX ADMIN — FOLIC ACID 1 MG: 1 TABLET ORAL at 08:10

## 2024-12-28 RX ADMIN — HEPARIN SODIUM 2900 UNITS: 1000 INJECTION INTRAVENOUS; SUBCUTANEOUS at 05:48

## 2024-12-28 ASSESSMENT — PAIN SCALES - GENERAL: PAINLEVEL_OUTOF10: 0

## 2024-12-28 NOTE — PROGRESS NOTES
Ernesto Hawkins M.D.,[facility]  Ron Mcelroy D.O., FVANGIE., [facility]  Marisol Keen M.D.  Ana Cristina Harmon M.D.   Jesus Manuel Blackwood D.O.  Joao Hernandez M.D.         Daily Pulmonary Progress Note    Patient:  Cammy Lopez 69 y.o. female MRN: [id number]            Synopsis     We are following patient for pulmonary embolism and recurrent left-sided effusion    \"CC\" : Shortness of breath    Code status: Full code      Subjective      Patient was seen and examined.  She is currently on 1 L of oxygen saturating between 97 to 99%.  Still feels weak fatigued and some dyspnea.      Review of Systems:  Constitutional: Denies fever, weight loss, night sweats, and fatigue  Skin: Denies pigmentation, dark lesions, and rashes   HEENT: Denies hearing loss, tinnitus, ear drainage, epistaxis, sore throat, and hoarseness.  Cardiovascular: Denies palpitations, chest pain, and chest pressure.  Respiratory: Denies cough, dyspnea at rest, hemoptysis, apnea, and choking.  Gastrointestinal: Denies nausea, vomiting, poor appetite, diarrhea, heartburn or reflux  Genitourinary: Denies dysuria, frequency, urgency or hematuria  Musculoskeletal: Denies myalgias, muscle weakness, and bone pain  Neurological: Denies dizziness, vertigo, headache, and focal weakness  Psychological: Denies anxiety and depression  Endocrine: Denies heat intolerance and cold intolerance  Hematopoietic/Lymphatic: Denies bleeding problems and blood transfusions    24-hour events:      Objective   OBJECTIVE:   /79   Pulse 57   Temp 97.5 °F (36.4 °C) (Axillary)   Resp 18   Ht 1.651 m (5' 5\")   Wt 71.7 kg (158 lb)   SpO2 97%   BMI 26.29 kg/m²   SpO2 Readings from Last 1 Encounters:   12/28/24 97%        I/O:    Intake/Output Summary (Last 24 hours) at 12/28/2024 1356  Last data filed at 12/28/2024 0905  Gross per 24 hour   Intake --   Output 600 ml   Net -600 ml                      CURRENT MEDS :  Scheduled Meds:   apixaban  10 mg Oral BID

## 2024-12-28 NOTE — PROGRESS NOTES
[facility] Hospitalist Progress Note    Admitting Date and Time: 12/26/2024 12:03 PM  Admit Dx: Lung mass [R91.8]  Pleural effusion on left [J90]  Leukocytosis, unspecified type [D72.829]  Pneumonia due to infectious organism, unspecified laterality, unspecified part of lung [J18.9]  Acute pulmonary embolism without acute cor pulmonale, unspecified pulmonary embolism type (HCC) [I26.99]  Pulmonary embolism without acute cor pulmonale, unspecified chronicity, unspecified pulmonary embolism type (HCC) [I26.99]    Subjective:  Patient is being followed for Lung mass [R91.8]  Pleural effusion on left [J90]  Leukocytosis, unspecified type [D72.829]  Pneumonia due to infectious organism, unspecified laterality, unspecified part of lung [J18.9]  Acute pulmonary embolism without acute cor pulmonale, unspecified pulmonary embolism type (HCC) [I26.99]  Pulmonary embolism without acute cor pulmonale, unspecified chronicity, unspecified pulmonary embolism type (HCC) [I26.99]     Patient is feeling much better    ROS: denies fever, chills, cp, sob, n/v, HA unless stated above.      doxycycline (VIBRAMYCIN) IV  100 mg IntraVENous Q12H    furosemide  20 mg Oral Daily    predniSONE  10 mg Oral Daily with breakfast    potassium chloride  10 mEq Oral Daily with breakfast    albumin human 25%  25 g IntraVENous Daily    fentaNYL  1 patch TransDERmal Q72H    sodium chloride flush  5-40 mL IntraVENous 2 times per day    ceFEPIme (MAXIPIME) 2,000 mg in sterile water 20 mL IV syringe  2,000 mg IntraVENous Q8H    escitalopram  20 mg Oral Daily    folic acid  1 mg Oral Daily    gabapentin  300 mg Oral Nightly    losartan  50 mg Oral Daily    primidone  100 mg Oral Nightly     sulfur hexafluoride microspheres, 2 mL, ONCE PRN  heparin (porcine), 80 Units/kg, PRN  heparin (porcine), 40 Units/kg, PRN  sodium chloride flush, 5-40 mL, PRN  sodium chloride, , PRN  potassium chloride, 40 mEq, PRN   Or  potassium alternative oral

## 2024-12-29 LAB
ALBUMIN SERPL-MCNC: 3 G/DL (ref 3.5–5.2)
ALP SERPL-CCNC: 110 U/L (ref 35–104)
ALT SERPL-CCNC: 12 U/L (ref 0–32)
ANION GAP SERPL CALCULATED.3IONS-SCNC: 9 MMOL/L (ref 7–16)
ASO AB SERPL-ACNC: 102 IU/ML (ref 0–200)
AST SERPL-CCNC: 17 U/L (ref 0–31)
BASOPHILS # BLD: 0.07 K/UL (ref 0–0.2)
BASOPHILS NFR BLD: 0 % (ref 0–2)
BILIRUB SERPL-MCNC: 0.3 MG/DL (ref 0–1.2)
BUN SERPL-MCNC: 11 MG/DL (ref 6–23)
CALCIUM SERPL-MCNC: 8 MG/DL (ref 8.6–10.2)
CHLORIDE SERPL-SCNC: 105 MMOL/L (ref 98–107)
CO2 SERPL-SCNC: 22 MMOL/L (ref 22–29)
CREAT SERPL-MCNC: 0.7 MG/DL (ref 0.5–1)
EOSINOPHIL # BLD: 0.04 K/UL (ref 0.05–0.5)
EOSINOPHILS RELATIVE PERCENT: 0 % (ref 0–6)
ERYTHROCYTE [DISTWIDTH] IN BLOOD BY AUTOMATED COUNT: 15.6 % (ref 11.5–15)
GFR, ESTIMATED: >90 ML/MIN/1.73M2
GLUCOSE SERPL-MCNC: 116 MG/DL (ref 74–99)
HCT VFR BLD AUTO: 31.9 % (ref 34–48)
HGB BLD-MCNC: 10 G/DL (ref 11.5–15.5)
IMM GRANULOCYTES # BLD AUTO: 0.31 K/UL (ref 0–0.58)
IMM GRANULOCYTES NFR BLD: 2 % (ref 0–5)
LYMPHOCYTES NFR BLD: 1.32 K/UL (ref 1.5–4)
LYMPHOCYTES RELATIVE PERCENT: 6 % (ref 20–42)
MCH RBC QN AUTO: 32.8 PG (ref 26–35)
MCHC RBC AUTO-ENTMCNC: 31.3 G/DL (ref 32–34.5)
MCV RBC AUTO: 104.6 FL (ref 80–99.9)
MICROORGANISM SPEC CULT: NORMAL
MONOCYTES NFR BLD: 1.26 K/UL (ref 0.1–0.95)
MONOCYTES NFR BLD: 6 % (ref 2–12)
NEUTROPHILS NFR BLD: 86 % (ref 43–80)
NEUTS SEG NFR BLD: 18.28 K/UL (ref 1.8–7.3)
PLATELET # BLD AUTO: 323 K/UL (ref 130–450)
PMV BLD AUTO: 10 FL (ref 7–12)
POTASSIUM SERPL-SCNC: 4.3 MMOL/L (ref 3.5–5)
PROT SERPL-MCNC: 6.3 G/DL (ref 6.4–8.3)
RBC # BLD AUTO: 3.05 M/UL (ref 3.5–5.5)
SODIUM SERPL-SCNC: 136 MMOL/L (ref 132–146)
SPECIMEN DESCRIPTION: NORMAL
WBC OTHER # BLD: 21.3 K/UL (ref 4.5–11.5)

## 2024-12-29 PROCEDURE — 99233 SBSQ HOSP IP/OBS HIGH 50: CPT | Performed by: INTERNAL MEDICINE

## 2024-12-29 PROCEDURE — 2500000003 HC RX 250 WO HCPCS: Performed by: STUDENT IN AN ORGANIZED HEALTH CARE EDUCATION/TRAINING PROGRAM

## 2024-12-29 PROCEDURE — 85025 COMPLETE CBC W/AUTO DIFF WBC: CPT

## 2024-12-29 PROCEDURE — 2500000003 HC RX 250 WO HCPCS: Performed by: INTERNAL MEDICINE

## 2024-12-29 PROCEDURE — 80053 COMPREHEN METABOLIC PANEL: CPT

## 2024-12-29 PROCEDURE — 2580000003 HC RX 258: Performed by: INTERNAL MEDICINE

## 2024-12-29 PROCEDURE — 6360000002 HC RX W HCPCS: Performed by: STUDENT IN AN ORGANIZED HEALTH CARE EDUCATION/TRAINING PROGRAM

## 2024-12-29 PROCEDURE — 2060000000 HC ICU INTERMEDIATE R&B

## 2024-12-29 PROCEDURE — 6370000000 HC RX 637 (ALT 250 FOR IP): Performed by: STUDENT IN AN ORGANIZED HEALTH CARE EDUCATION/TRAINING PROGRAM

## 2024-12-29 PROCEDURE — 2700000000 HC OXYGEN THERAPY PER DAY

## 2024-12-29 PROCEDURE — 6370000000 HC RX 637 (ALT 250 FOR IP): Performed by: INTERNAL MEDICINE

## 2024-12-29 PROCEDURE — 6360000002 HC RX W HCPCS: Performed by: INTERNAL MEDICINE

## 2024-12-29 RX ORDER — VANCOMYCIN 1 G/200ML
1000 INJECTION, SOLUTION INTRAVENOUS EVERY 12 HOURS
Status: DISCONTINUED | OUTPATIENT
Start: 2024-12-29 | End: 2024-12-30

## 2024-12-29 RX ORDER — CEFEPIME HYDROCHLORIDE 2 G/1
2 INJECTION, POWDER, FOR SOLUTION INTRAVENOUS EVERY 8 HOURS
Status: DISCONTINUED | OUTPATIENT
Start: 2024-12-29 | End: 2024-12-29

## 2024-12-29 RX ORDER — WATER 10 ML/10ML
10 INJECTION INTRAMUSCULAR; INTRAVENOUS; SUBCUTANEOUS EVERY 8 HOURS
Status: DISCONTINUED | OUTPATIENT
Start: 2024-12-29 | End: 2024-12-29

## 2024-12-29 RX ADMIN — POTASSIUM CHLORIDE 10 MEQ: 750 TABLET, EXTENDED RELEASE ORAL at 08:36

## 2024-12-29 RX ADMIN — SODIUM CHLORIDE, PRESERVATIVE FREE 10 ML: 5 INJECTION INTRAVENOUS at 08:38

## 2024-12-29 RX ADMIN — WATER 2000 MG: 1 INJECTION INTRAMUSCULAR; INTRAVENOUS; SUBCUTANEOUS at 00:42

## 2024-12-29 RX ADMIN — LOSARTAN POTASSIUM 50 MG: 50 TABLET, FILM COATED ORAL at 08:37

## 2024-12-29 RX ADMIN — VANCOMYCIN 1000 MG: 1 INJECTION, SOLUTION INTRAVENOUS at 12:25

## 2024-12-29 RX ADMIN — PREDNISONE 10 MG: 5 TABLET ORAL at 08:37

## 2024-12-29 RX ADMIN — FUROSEMIDE 20 MG: 20 TABLET ORAL at 05:53

## 2024-12-29 RX ADMIN — APIXABAN 10 MG: 5 TABLET, FILM COATED ORAL at 08:37

## 2024-12-29 RX ADMIN — PRIMIDONE 100 MG: 50 TABLET ORAL at 00:51

## 2024-12-29 RX ADMIN — FOLIC ACID 1 MG: 1 TABLET ORAL at 08:36

## 2024-12-29 RX ADMIN — ESCITALOPRAM OXALATE 20 MG: 10 TABLET ORAL at 08:36

## 2024-12-29 RX ADMIN — WATER 2000 MG: 1 INJECTION INTRAMUSCULAR; INTRAVENOUS; SUBCUTANEOUS at 08:39

## 2024-12-29 RX ADMIN — DOXYCYCLINE 100 MG: 100 INJECTION, POWDER, LYOPHILIZED, FOR SOLUTION INTRAVENOUS at 08:50

## 2024-12-29 RX ADMIN — APIXABAN 10 MG: 5 TABLET, FILM COATED ORAL at 20:25

## 2024-12-29 RX ADMIN — SODIUM CHLORIDE, PRESERVATIVE FREE 10 ML: 5 INJECTION INTRAVENOUS at 20:26

## 2024-12-29 RX ADMIN — PRIMIDONE 100 MG: 50 TABLET ORAL at 20:26

## 2024-12-29 RX ADMIN — GABAPENTIN 300 MG: 300 CAPSULE ORAL at 20:25

## 2024-12-29 ASSESSMENT — PAIN SCALES - GENERAL: PAINLEVEL_OUTOF10: 0

## 2024-12-29 NOTE — PLAN OF CARE
Problem: ABCDS Injury Assessment  Goal: Absence of physical injury  12/28/2024 2246 by Charlie Jimenez, RN  Outcome: Progressing     Problem: Safety - Adult  Goal: Free from fall injury  12/28/2024 2246 by Charlie Jimenez, RN  Outcome: Progressing     Problem: Respiratory - Adult  Goal: Achieves optimal ventilation and oxygenation  12/28/2024 2246 by Charlie Jimenez, RN  Outcome: Progressing     Problem: Hematologic - Adult  Goal: Maintains hematologic stability  12/28/2024 2246 by Charlie Jimenez RN  Outcome: Progressing

## 2024-12-29 NOTE — PROGRESS NOTES
Pharmacy Consultation Note  (Antibiotic Dosing and Monitoring)    Initial consult date: 12/29  Consulting physician/provider: Sara  Drug: Vancomycin  Indication: CAP    Age/  Gender Height Weight IBW  Allergy Information   69 y.o./female 152.4 cm (5') 71.7 kg (158 lb)     Ideal body weight: 57 kg (125 lb 10.6 oz)  Adjusted ideal body weight: 62.9 kg (138 lb 9.6 oz)   Pcn [penicillins]      Renal Function:  Recent Labs     12/27/24  0330 12/28/24  0500 12/29/24  0630   BUN 12 12 11   CREATININE 0.8 0.7 0.7     No intake or output data in the 24 hours ending 12/29/24 1238    Vancomycin Monitoring:  Trough:  No results for input(s): \"VANCOTROUGH\" in the last 72 hours.  Random:  No results for input(s): \"VANCORANDOM\" in the last 72 hours.    Vancomycin Administration Times:  Recent vancomycin administrations                     vancomycin (VANCOCIN) 1000 mg in 200 mL IVPB (mg) 1,000 mg New Bag 12/29/24 1225    vancomycin (VANCOCIN) 1,500 mg in sodium chloride 0.9 % 250 mL IVPB (mg) 1,500 mg New Bag 12/26/24 1837                    Assessment:  Patient is a 69 y.o. female who has been initiated on vancomycin  Estimated Creatinine Clearance: 77 mL/min (based on SCr of 0.7 mg/dL).  To dose vancomycin, pharmacy will be utilizing [employer] calculation software for goal AUC/GEOVANI 400-600 mg/L-hr (predicted AUC/GEOVANI = 550, Tr =17.6 mcg/mL)    Plan:  Will continue vancomycin 1000 mg IV every 12 hours  Will check vancomycin levels when appropriate  Will continue to monitor renal function   Pharmacy to follow      Angelita Montague RPH, PharmD, BCPS 12/29/2024 12:38 PM   Ext: [phone]  SUMAN: [phone]  SEY: [phone]  SJW: [phone]

## 2024-12-29 NOTE — PROGRESS NOTES
Ernesto Hawkins M.D.,[facility]  Ron Mcelroy D.O., ROXANN., [facility]  Marisol Keen M.D.  Ana Cristina Harmon M.D.   Jesus Manuel Blackwood D.O.  Joao Hernandez M.D.         Daily Pulmonary Progress Note    Patient:  Cammy Lopez 69 y.o. female MRN: [id number]            Synopsis     We are following patient for pulmonary embolism and recurrent left-sided effusion    \"CC\" : Shortness of breath    Code status: Full code      Subjective      Patient was seen and examined.  She is currently on 1 L of oxygen saturating between 97 to 99%.  Today had a blood tinged sputum.  No active bleeding.  Sitting up in bed.    Review of Systems:  Constitutional: Denies fever, weight loss, night sweats, and fatigue  Skin: Denies pigmentation, dark lesions, and rashes   HEENT: Denies hearing loss, tinnitus, ear drainage, epistaxis, sore throat, and hoarseness.  Cardiovascular: Denies palpitations, chest pain, and chest pressure.  Respiratory: Denies cough, dyspnea at rest, hemoptysis, apnea, and choking.  Gastrointestinal: Denies nausea, vomiting, poor appetite, diarrhea, heartburn or reflux  Genitourinary: Denies dysuria, frequency, urgency or hematuria  Musculoskeletal: Denies myalgias, muscle weakness, and bone pain  Neurological: Denies dizziness, vertigo, headache, and focal weakness  Psychological: Denies anxiety and depression  Endocrine: Denies heat intolerance and cold intolerance  Hematopoietic/Lymphatic: Denies bleeding problems and blood transfusions    24-hour events:      Objective   OBJECTIVE:   /83   Pulse 71   Temp 98.2 °F (36.8 °C) (Oral)   Resp 18   Ht 1.651 m (5' 5\")   Wt 71.7 kg (158 lb)   SpO2 100%   BMI 26.29 kg/m²   SpO2 Readings from Last 1 Encounters:   12/29/24 100%        I/O:  No intake or output data in the 24 hours ending 12/29/24 1308                     CURRENT MEDS :  Scheduled Meds:   ceFEPIme  2 g IntraVENous Q8H    And    sterile water  10 mL Injection q8h    vancomycin  1,000 mg

## 2024-12-29 NOTE — PROGRESS NOTES
[facility] Hospitalist Progress Note    Admitting Date and Time: 12/26/2024 12:03 PM  Admit Dx: Lung mass [R91.8]  Pleural effusion on left [J90]  Leukocytosis, unspecified type [D72.829]  Pneumonia due to infectious organism, unspecified laterality, unspecified part of lung [J18.9]  Acute pulmonary embolism without acute cor pulmonale, unspecified pulmonary embolism type (HCC) [I26.99]  Pulmonary embolism without acute cor pulmonale, unspecified chronicity, unspecified pulmonary embolism type (HCC) [I26.99]    Subjective:  Patient is being followed for Lung mass [R91.8]  Pleural effusion on left [J90]  Leukocytosis, unspecified type [D72.829]  Pneumonia due to infectious organism, unspecified laterality, unspecified part of lung [J18.9]  Acute pulmonary embolism without acute cor pulmonale, unspecified pulmonary embolism type (HCC) [I26.99]  Pulmonary embolism without acute cor pulmonale, unspecified chronicity, unspecified pulmonary embolism type (HCC) [I26.99]     No acute events ovenight    ROS: denies fever, chills, cp, sob, n/v, HA unless stated above.      doxycycline (VIBRAMYCIN) IV  100 mg IntraVENous Q12H    ceFEPIme  2 g IntraVENous Q8H    And    sterile water  10 mL Injection q8h    vancomycin  1,000 mg IntraVENous Q12H    apixaban  10 mg Oral BID    Followed by    [START ON 1/4/2025] apixaban  5 mg Oral BID    furosemide  20 mg Oral Daily    predniSONE  10 mg Oral Daily with breakfast    potassium chloride  10 mEq Oral Daily with breakfast    fentaNYL  1 patch TransDERmal Q72H    sodium chloride flush  5-40 mL IntraVENous 2 times per day    escitalopram  20 mg Oral Daily    folic acid  1 mg Oral Daily    gabapentin  300 mg Oral Nightly    losartan  50 mg Oral Daily    primidone  100 mg Oral Nightly     sulfur hexafluoride microspheres, 2 mL, ONCE PRN  sodium chloride flush, 5-40 mL, PRN  sodium chloride, , PRN  potassium chloride, 40 mEq, PRN   Or  potassium alternative oral replacement, 40

## 2024-12-29 NOTE — CONSULTS
Ernesto Hawkins M.D.,[facility]  Ron Mcelroy D.O., ROXANN., [facility]  Raj Keen M.D.  Ana Cristina Harmon M.D.   Jesus Manuel Blackwood D.O.  Joao Hernandez M.D.       Patient:  Cammy Lopez 69 y.o. female MRN: [id number]           PULMONARY CONSULTATION    Reason for Consultation: PE  Referring Physician: Dr Ramonita Hahn    Communication with the referring physician will be sent via the electronic medical record.    Chief Complaint: SOB with cough    CODE STATUS: FULL    SUBJECTIVE:  HPI:  Cammy Lopez is a 69 y.o. female with PMH of has a past medical history of Hypertension, pleural effusion hx thoracentesis, Lacrimal duct stenosis, bilateral, Malignant neoplasm of lower lobe of left lung (HCC), Secondary cancer of bone (HCC), Secondary cancer of brain (HCC), Secondary cancer of brain (HCC), Status post stereotactic radiosurgery, Status post stereotactic radiosurgery, and Thyroid disease.     Known to Dr Keen.  Had EBUS on 3/7/23. A complete ultrasound lymph node exam was performed for lymph node stations 4, 7, 10 and 11.  The following lymph nodes were subjected to EBUS guided biopsy using standard technique and in the following order:  1.  Level 11 L x 7 transbronchial needle aspirations   2.  Level 7x4 transbronchial needle aspiration  Resulted on 3/15/27 --> negative for except 1 pass of atypical cells. PET was arranged.  Diagnosis confirmed adenocarcinoma by bone  biopsy.    Follows with Dr. Moon.  3-month follow-up for radiation oncology July 30, 2024.  04/13/2023: Cycle # 1 Carbo/ Alimta/ Keytruda per Medical Oncology.  Receiving Xgeva for osseous mets, and second line treatment Taxotere and Cyramza per Oncology.     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  Currently on chemotherapy., beginning several days ago.  Sx worsened by deep breath, cough.  She has no hemoptysis.  Denies fever or 
M_SA    Recent Labs     12/28/24  1535          Recent Labs     12/26/24  1625   PROCAL 0.14*       Assessment:  Metastatic lung cancer, undergoing chemotherapy  Left exudative pleural effusion.  Status post thoracentesis.  Gram stain showed rare GPC in chains.  Cultures are negative so far.  She was treated with Cefepime and Doxycycline prior to the cultures.  I expect the cultures to be negative  Possible left upper lobe pneumonia  Pulmonary embolus    Plan:    Continue Vancomycin for now  Stop Cefepime  Check cultures, baseline ESR, CRP, ASO titer  She has a Mediport that can be used for IV antibiotics  Will follow with you    Thank you for having us see this patient in consultation. I will be discussing this case with the treating physicians or communicated through the electronic health record.  Case discussed with Dr. Keen.    Tiago Martines MD  9:17 AM  12/29/2024

## 2024-12-30 LAB
ALBUMIN SERPL-MCNC: 2.7 G/DL (ref 3.5–5.2)
ALP SERPL-CCNC: 99 U/L (ref 35–104)
ALT SERPL-CCNC: 12 U/L (ref 0–32)
ANION GAP SERPL CALCULATED.3IONS-SCNC: 7 MMOL/L (ref 7–16)
AST SERPL-CCNC: 18 U/L (ref 0–31)
BASOPHILS # BLD: 0.05 K/UL (ref 0–0.2)
BASOPHILS NFR BLD: 0 % (ref 0–2)
BILIRUB SERPL-MCNC: 0.2 MG/DL (ref 0–1.2)
BUN SERPL-MCNC: 11 MG/DL (ref 6–23)
CALCIUM SERPL-MCNC: 7.9 MG/DL (ref 8.6–10.2)
CHLORIDE SERPL-SCNC: 104 MMOL/L (ref 98–107)
CO2 SERPL-SCNC: 25 MMOL/L (ref 22–29)
CREAT SERPL-MCNC: 0.8 MG/DL (ref 0.5–1)
EOSINOPHIL # BLD: 0.04 K/UL (ref 0.05–0.5)
EOSINOPHILS RELATIVE PERCENT: 0 % (ref 0–6)
ERYTHROCYTE [DISTWIDTH] IN BLOOD BY AUTOMATED COUNT: 15.6 % (ref 11.5–15)
GFR, ESTIMATED: 87 ML/MIN/1.73M2
GLUCOSE SERPL-MCNC: 105 MG/DL (ref 74–99)
HCT VFR BLD AUTO: 33.2 % (ref 34–48)
HGB BLD-MCNC: 10.3 G/DL (ref 11.5–15.5)
IMM GRANULOCYTES # BLD AUTO: 0.26 K/UL (ref 0–0.58)
IMM GRANULOCYTES NFR BLD: 2 % (ref 0–5)
LYMPHOCYTES NFR BLD: 0.87 K/UL (ref 1.5–4)
LYMPHOCYTES RELATIVE PERCENT: 5 % (ref 20–42)
MCH RBC QN AUTO: 32.7 PG (ref 26–35)
MCHC RBC AUTO-ENTMCNC: 31 G/DL (ref 32–34.5)
MCV RBC AUTO: 105.4 FL (ref 80–99.9)
MICROORGANISM SPEC CULT: ABNORMAL
MICROORGANISM/AGENT SPEC: ABNORMAL
MONOCYTES NFR BLD: 0.98 K/UL (ref 0.1–0.95)
MONOCYTES NFR BLD: 6 % (ref 2–12)
NEUTROPHILS NFR BLD: 86 % (ref 43–80)
NEUTS SEG NFR BLD: 13.98 K/UL (ref 1.8–7.3)
PLATELET # BLD AUTO: 379 K/UL (ref 130–450)
PMV BLD AUTO: 9.9 FL (ref 7–12)
POTASSIUM SERPL-SCNC: 4.5 MMOL/L (ref 3.5–5)
PROCALCITONIN SERPL-MCNC: 0.08 NG/ML (ref 0–0.08)
PROT SERPL-MCNC: 5.9 G/DL (ref 6.4–8.3)
RBC # BLD AUTO: 3.15 M/UL (ref 3.5–5.5)
SERVICE CMNT-IMP: ABNORMAL
SODIUM SERPL-SCNC: 136 MMOL/L (ref 132–146)
SPECIMEN DESCRIPTION: ABNORMAL
WBC OTHER # BLD: 16.2 K/UL (ref 4.5–11.5)

## 2024-12-30 PROCEDURE — 6360000002 HC RX W HCPCS: Performed by: INTERNAL MEDICINE

## 2024-12-30 PROCEDURE — 6370000000 HC RX 637 (ALT 250 FOR IP): Performed by: INTERNAL MEDICINE

## 2024-12-30 PROCEDURE — 85025 COMPLETE CBC W/AUTO DIFF WBC: CPT

## 2024-12-30 PROCEDURE — 2500000003 HC RX 250 WO HCPCS: Performed by: STUDENT IN AN ORGANIZED HEALTH CARE EDUCATION/TRAINING PROGRAM

## 2024-12-30 PROCEDURE — 6360000002 HC RX W HCPCS

## 2024-12-30 PROCEDURE — 36415 COLL VENOUS BLD VENIPUNCTURE: CPT

## 2024-12-30 PROCEDURE — 99233 SBSQ HOSP IP/OBS HIGH 50: CPT | Performed by: INTERNAL MEDICINE

## 2024-12-30 PROCEDURE — 2700000000 HC OXYGEN THERAPY PER DAY

## 2024-12-30 PROCEDURE — 84145 PROCALCITONIN (PCT): CPT

## 2024-12-30 PROCEDURE — 6370000000 HC RX 637 (ALT 250 FOR IP): Performed by: STUDENT IN AN ORGANIZED HEALTH CARE EDUCATION/TRAINING PROGRAM

## 2024-12-30 PROCEDURE — 2500000003 HC RX 250 WO HCPCS

## 2024-12-30 PROCEDURE — 2060000000 HC ICU INTERMEDIATE R&B

## 2024-12-30 PROCEDURE — 80053 COMPREHEN METABOLIC PANEL: CPT

## 2024-12-30 RX ORDER — OXYCODONE AND ACETAMINOPHEN 10; 325 MG/1; MG/1
1 TABLET ORAL EVERY 6 HOURS PRN
Status: DISCONTINUED | OUTPATIENT
Start: 2024-12-30 | End: 2024-12-31 | Stop reason: HOSPADM

## 2024-12-30 RX ADMIN — VANCOMYCIN 1000 MG: 1 INJECTION, SOLUTION INTRAVENOUS at 00:29

## 2024-12-30 RX ADMIN — APIXABAN 10 MG: 5 TABLET, FILM COATED ORAL at 20:26

## 2024-12-30 RX ADMIN — PRIMIDONE 100 MG: 50 TABLET ORAL at 20:26

## 2024-12-30 RX ADMIN — WATER 2000 MG: 1 INJECTION INTRAMUSCULAR; INTRAVENOUS; SUBCUTANEOUS at 20:27

## 2024-12-30 RX ADMIN — SODIUM CHLORIDE, PRESERVATIVE FREE 10 ML: 5 INJECTION INTRAVENOUS at 09:36

## 2024-12-30 RX ADMIN — FOLIC ACID 1 MG: 1 TABLET ORAL at 09:36

## 2024-12-30 RX ADMIN — PREDNISONE 10 MG: 5 TABLET ORAL at 09:36

## 2024-12-30 RX ADMIN — POTASSIUM CHLORIDE 10 MEQ: 750 TABLET, EXTENDED RELEASE ORAL at 09:36

## 2024-12-30 RX ADMIN — GABAPENTIN 300 MG: 300 CAPSULE ORAL at 20:26

## 2024-12-30 RX ADMIN — FUROSEMIDE 20 MG: 20 TABLET ORAL at 05:46

## 2024-12-30 RX ADMIN — APIXABAN 10 MG: 5 TABLET, FILM COATED ORAL at 09:36

## 2024-12-30 RX ADMIN — SODIUM CHLORIDE, PRESERVATIVE FREE 10 ML: 5 INJECTION INTRAVENOUS at 20:27

## 2024-12-30 RX ADMIN — WATER 2000 MG: 1 INJECTION INTRAMUSCULAR; INTRAVENOUS; SUBCUTANEOUS at 12:35

## 2024-12-30 RX ADMIN — LOSARTAN POTASSIUM 50 MG: 50 TABLET, FILM COATED ORAL at 09:36

## 2024-12-30 NOTE — DISCHARGE INSTRUCTIONS
[facility]  ([facility])  [address]  [address]  [place] [address]  Phone [phone]   Fax [phone]    Errol Chapin MD, FACP MD Manju Otoole MD Indra P. Limbu, MD Eunice A. H. Wong, MD Dayday Lynch, MD Nicholas Wilson, CNS Nick Harris, APRN, CNS  Shalini Bonilla, APRN-CNP Scotty Wick, APRN, CNS Anastasia Jon, APRN-CNP                 STANDING ORDERS (“ID Protocol”)     Visiting nurses are to write the Primary Care Physician and their own call back number on all laboratory requisition forms.   Abnormal lab values are called to the physician by the nurse and NOT by the laboratory.   Fax all labs to the office in a timely manner, during office hours. All faxes should include nurse’s name and call back number.  Vascular Access Devices or VADs (TLC, PICC, Midline, etc) will be replaced as necessary.  Draw all blood work from VADs, except for drug levels.  If unable to access a VAD, insert a peripheral catheter temporarily. Contact the Primary Care Physician or [facility] office for surgical referral.  Use tPA (Cathflo®, Alteplase®) as per agency protocol to restore patency of VAD.  Saline flush 10ml or heparin flush 10U/cc IV daily and as needed to maintain line patency.  Remove VAD upon completion of IV antibiotics, unless otherwise specified by the ordering physician.  If VAD cannot be removed, schedule appointment at office for removal.  If VAD was placed by Radiology, schedule appointment for removal.  Notify ordering physician or office if patient requires admission to the hospital with reason for admission.  Discontinue all blood work upon completion of IV antibiotics, unless otherwise specified by ordering physician.  Notify ordering physician if the patient does not receive the scheduled antibiotic for 24 hours or more.  The Pharmacy and Home Health Agency may adjust the timing of the infusion and blood work to

## 2024-12-30 NOTE — PROGRESS NOTES
Ernesto Hawkins M.D.,[facility]  Ron Mcelroy D.O., FVANGIE., [facility]  Marisol Keen M.D.  Ana Cristina Harmon M.D.   Jesus Manuel Blackwood D.O.  Joao Hernandez M.D.         Daily Pulmonary Progress Note    Patient:  Cammy Lopez 69 y.o. female MRN: [id number]            Synopsis     We are following patient for pulmonary embolism and recurrent left-sided effusion     \"CC\" Shortness of breath     Code status:Full code       Subjective      Patient was seen and examined.  She is currently on 1 L of oxygen saturating between 97 to 99%.  She was sitting up on the side of the bed.  Legs were quite swollen.  She was instructed to keep them elevated.  GRACE hose ordered.  Infectious disease managing antibiotics.  Vancomycin discontinued, now on Ancef.      Review of Systems:  Constitutional: Denies fever, weight loss, night sweats, and fatigue  Skin: Denies pigmentation, dark lesions, and rashes   HEENT: Denies hearing loss, tinnitus, ear drainage, epistaxis, sore throat, and hoarseness.  Cardiovascular: Denies palpitations, chest pain, and chest pressure.  Respiratory:  Shortness of breath   Gastrointestinal: Denies nausea, vomiting, poor appetite, diarrhea, heartburn or reflux  Genitourinary: Denies dysuria, frequency, urgency or hematuria  Musculoskeletal: BLE edema   Neurological: Denies dizziness, vertigo, headache, and focal weakness  Psychological: Denies anxiety and depression  Endocrine: Denies heat intolerance and cold intolerance  Hematopoietic/Lymphatic: Denies bleeding problems and blood transfusions    24-hour events:  No new events    Objective   OBJECTIVE:   /72   Pulse 71   Temp 97.7 °F (36.5 °C) (Oral)   Resp 18   Ht 1.651 m (5' 5\")   Wt 71.7 kg (158 lb)   SpO2 98%   BMI 26.29 kg/m²   SpO2 Readings from Last 1 Encounters:   12/30/24 98%        I/O:  No intake or output data in the 24 hours ending 12/30/24 0919                   CURRENT MEDS :  Scheduled Meds:   vancomycin  1,000 mg

## 2024-12-30 NOTE — PROGRESS NOTES
[facility] Hospitalist Progress Note    Admitting Date and Time: 12/26/2024 12:03 PM  Admit Dx: Lung mass [R91.8]  Pleural effusion on left [J90]  Leukocytosis, unspecified type [D72.829]  Pneumonia due to infectious organism, unspecified laterality, unspecified part of lung [J18.9]  Acute pulmonary embolism without acute cor pulmonale, unspecified pulmonary embolism type (HCC) [I26.99]  Pulmonary embolism without acute cor pulmonale, unspecified chronicity, unspecified pulmonary embolism type (HCC) [I26.99]    Subjective:  Patient is being followed for Lung mass [R91.8]  Pleural effusion on left [J90]  Leukocytosis, unspecified type [D72.829]  Pneumonia due to infectious organism, unspecified laterality, unspecified part of lung [J18.9]  Acute pulmonary embolism without acute cor pulmonale, unspecified pulmonary embolism type (HCC) [I26.99]  Pulmonary embolism without acute cor pulmonale, unspecified chronicity, unspecified pulmonary embolism type (HCC) [I26.99]     No acute events overnight    ROS: denies fever, chills, cp, sob, n/v, HA unless stated above.      vancomycin  1,000 mg IntraVENous Q12H    apixaban  10 mg Oral BID    Followed by    [START ON 1/4/2025] apixaban  5 mg Oral BID    furosemide  20 mg Oral Daily    predniSONE  10 mg Oral Daily with breakfast    potassium chloride  10 mEq Oral Daily with breakfast    fentaNYL  1 patch TransDERmal Q72H    sodium chloride flush  5-40 mL IntraVENous 2 times per day    escitalopram  20 mg Oral Daily    folic acid  1 mg Oral Daily    gabapentin  300 mg Oral Nightly    losartan  50 mg Oral Daily    primidone  100 mg Oral Nightly     oxyCODONE-acetaminophen, 1 tablet, Q6H PRN  sulfur hexafluoride microspheres, 2 mL, ONCE PRN  sodium chloride flush, 5-40 mL, PRN  sodium chloride, , PRN  potassium chloride, 40 mEq, PRN   Or  potassium alternative oral replacement, 40 mEq, PRN   Or  potassium chloride, 10 mEq, PRN  magnesium sulfate, 2,000 mg,

## 2024-12-30 NOTE — PROGRESS NOTES
[facility] Quality Flow/Interdisciplinary Rounds Progress Note        Quality Flow Rounds held on December 30, 2024    Disciplines Attending:  Bedside Nurse, , , and Nursing Unit Leadership    Cammy Lopez was admitted on 12/26/2024 12:03 PM    Anticipated Discharge Date:       Disposition:    Hunter Score:  Hunter Scale Score: 20    BS RISK OF UNPLANNED READMISSION 2.0             15.3 Total Score        Discussed patient goal for the day, patient clinical progression, and barriers to discharge.  The following Goal(s) of the Day/Commitment(s) have been identified:   iv abx, wean oxygen as able, ambulate oxygen testing prior to d/c       Stefani Tesfaye RN  December 30, 2024

## 2024-12-30 NOTE — PLAN OF CARE
Problem: ABCDS Injury Assessment  Goal: Absence of physical injury  Outcome: Progressing     Problem: Safety - Adult  Goal: Free from fall injury  Outcome: Progressing     Problem: Respiratory - Adult  Goal: Achieves optimal ventilation and oxygenation  Outcome: Progressing     Problem: Hematologic - Adult  Goal: Maintains hematologic stability  Outcome: Progressing

## 2024-12-30 NOTE — PROGRESS NOTES
Pharmacy Consultation Note  (Antibiotic Dosing and Monitoring)        Note vancomycin discontinued. Clinical pharmacy will sign-off. Please re-consult if we can be of further assistance.    Thanks for this consult,  Angelita Montague RPH, PharmD, BCPS  12/30/2024  2:51 PM

## 2024-12-30 NOTE — CARE COORDINATION
Pt w/lung cancer, left thoracentesis completed 12/27. Pleural fluid w/MSSA - ID following w/vanco switched to cefazolin. Pt will return home upon discharge where she is independent w/o any DME. She has a cane if needed.  Pt is currently on 2L NC, goal is to wean. If unable to wean O2 pt has no preference of DME supplier w/referral to [facility], [phone]. Ambulatory pulse ox testing needed prior to discharge and orders if pt qualifies. Pt will return home upon discharge and has transportation. Will follow.   GAGAN PiersonN, RN  [facility] Case Management  [phone]      IV ancef needed upon discharge, script faxed to [facility] w/Danae notified. Pt agreeable to [facility] w/acceptance from [facility].

## 2024-12-30 NOTE — PLAN OF CARE
Problem: ABCDS Injury Assessment  Goal: Absence of physical injury  12/30/2024 1252 by Galindo Harmon RN  Outcome: Progressing  12/30/2024 0332 by Jessika Schumacher RN  Outcome: Progressing     Problem: Safety - Adult  Goal: Free from fall injury  12/30/2024 1252 by Galindo Harmon RN  Outcome: Progressing  12/30/2024 0332 by Jessika Schumacher RN  Outcome: Progressing     Problem: Respiratory - Adult  Goal: Achieves optimal ventilation and oxygenation  12/30/2024 1252 by Galindo Harmon RN  Outcome: Progressing  12/30/2024 0332 by Jessika Schumacher RN  Outcome: Progressing     Problem: Hematologic - Adult  Goal: Maintains hematologic stability  12/30/2024 1252 by Galindo Harmon RN  Outcome: Progressing  12/30/2024 0332 by Jessika Schumacher RN  Outcome: Progressing

## 2024-12-31 VITALS
BODY MASS INDEX: 26.33 KG/M2 | OXYGEN SATURATION: 94 % | WEIGHT: 158 LBS | DIASTOLIC BLOOD PRESSURE: 84 MMHG | HEART RATE: 72 BPM | TEMPERATURE: 98.8 F | SYSTOLIC BLOOD PRESSURE: 146 MMHG | HEIGHT: 65 IN | RESPIRATION RATE: 18 BRPM

## 2024-12-31 LAB
ALBUMIN SERPL-MCNC: 2.7 G/DL (ref 3.5–5.2)
ALP SERPL-CCNC: 94 U/L (ref 35–104)
ALT SERPL-CCNC: 12 U/L (ref 0–32)
ANION GAP SERPL CALCULATED.3IONS-SCNC: 7 MMOL/L (ref 7–16)
AST SERPL-CCNC: 21 U/L (ref 0–31)
BASOPHILS # BLD: 0.05 K/UL (ref 0–0.2)
BASOPHILS NFR BLD: 0 % (ref 0–2)
BILIRUB SERPL-MCNC: 0.2 MG/DL (ref 0–1.2)
BUN SERPL-MCNC: 11 MG/DL (ref 6–23)
CALCIUM SERPL-MCNC: 8 MG/DL (ref 8.6–10.2)
CHLORIDE SERPL-SCNC: 104 MMOL/L (ref 98–107)
CO2 SERPL-SCNC: 23 MMOL/L (ref 22–29)
CREAT SERPL-MCNC: 0.7 MG/DL (ref 0.5–1)
EOSINOPHIL # BLD: 0.04 K/UL (ref 0.05–0.5)
EOSINOPHILS RELATIVE PERCENT: 0 % (ref 0–6)
ERYTHROCYTE [DISTWIDTH] IN BLOOD BY AUTOMATED COUNT: 15.8 % (ref 11.5–15)
GFR, ESTIMATED: >90 ML/MIN/1.73M2
GLUCOSE SERPL-MCNC: 106 MG/DL (ref 74–99)
HCT VFR BLD AUTO: 32.4 % (ref 34–48)
HGB BLD-MCNC: 10.2 G/DL (ref 11.5–15.5)
IMM GRANULOCYTES # BLD AUTO: 0.13 K/UL (ref 0–0.58)
IMM GRANULOCYTES NFR BLD: 1 % (ref 0–5)
LYMPHOCYTES NFR BLD: 1.12 K/UL (ref 1.5–4)
LYMPHOCYTES RELATIVE PERCENT: 8 % (ref 20–42)
MCH RBC QN AUTO: 32.5 PG (ref 26–35)
MCHC RBC AUTO-ENTMCNC: 31.5 G/DL (ref 32–34.5)
MCV RBC AUTO: 103.2 FL (ref 80–99.9)
MICROORGANISM SPEC CULT: NORMAL
MICROORGANISM SPEC CULT: NORMAL
MONOCYTES NFR BLD: 0.99 K/UL (ref 0.1–0.95)
MONOCYTES NFR BLD: 7 % (ref 2–12)
NEUTROPHILS NFR BLD: 84 % (ref 43–80)
NEUTS SEG NFR BLD: 11.85 K/UL (ref 1.8–7.3)
NON-GYN CYTOLOGY REPORT: NORMAL
PLATELET # BLD AUTO: 412 K/UL (ref 130–450)
PMV BLD AUTO: 9.9 FL (ref 7–12)
POTASSIUM SERPL-SCNC: 4.2 MMOL/L (ref 3.5–5)
PROT SERPL-MCNC: 5.8 G/DL (ref 6.4–8.3)
RBC # BLD AUTO: 3.14 M/UL (ref 3.5–5.5)
SERVICE CMNT-IMP: NORMAL
SERVICE CMNT-IMP: NORMAL
SODIUM SERPL-SCNC: 134 MMOL/L (ref 132–146)
SPECIMEN DESCRIPTION: NORMAL
SPECIMEN DESCRIPTION: NORMAL
WBC OTHER # BLD: 14.2 K/UL (ref 4.5–11.5)

## 2024-12-31 PROCEDURE — 80053 COMPREHEN METABOLIC PANEL: CPT

## 2024-12-31 PROCEDURE — 2500000003 HC RX 250 WO HCPCS

## 2024-12-31 PROCEDURE — 6370000000 HC RX 637 (ALT 250 FOR IP): Performed by: STUDENT IN AN ORGANIZED HEALTH CARE EDUCATION/TRAINING PROGRAM

## 2024-12-31 PROCEDURE — 6360000002 HC RX W HCPCS

## 2024-12-31 PROCEDURE — 2500000003 HC RX 250 WO HCPCS: Performed by: STUDENT IN AN ORGANIZED HEALTH CARE EDUCATION/TRAINING PROGRAM

## 2024-12-31 PROCEDURE — 6370000000 HC RX 637 (ALT 250 FOR IP): Performed by: INTERNAL MEDICINE

## 2024-12-31 PROCEDURE — 99239 HOSP IP/OBS DSCHRG MGMT >30: CPT | Performed by: INTERNAL MEDICINE

## 2024-12-31 PROCEDURE — 85025 COMPLETE CBC W/AUTO DIFF WBC: CPT

## 2024-12-31 RX ADMIN — FOLIC ACID 1 MG: 1 TABLET ORAL at 08:03

## 2024-12-31 RX ADMIN — ESCITALOPRAM OXALATE 20 MG: 10 TABLET ORAL at 08:01

## 2024-12-31 RX ADMIN — WATER 2000 MG: 1 INJECTION INTRAMUSCULAR; INTRAVENOUS; SUBCUTANEOUS at 03:22

## 2024-12-31 RX ADMIN — SODIUM CHLORIDE, PRESERVATIVE FREE 10 ML: 5 INJECTION INTRAVENOUS at 08:03

## 2024-12-31 RX ADMIN — FUROSEMIDE 20 MG: 20 TABLET ORAL at 05:25

## 2024-12-31 RX ADMIN — PREDNISONE 10 MG: 5 TABLET ORAL at 08:11

## 2024-12-31 RX ADMIN — POTASSIUM CHLORIDE 10 MEQ: 750 TABLET, EXTENDED RELEASE ORAL at 08:02

## 2024-12-31 RX ADMIN — LOSARTAN POTASSIUM 50 MG: 50 TABLET, FILM COATED ORAL at 08:01

## 2024-12-31 RX ADMIN — APIXABAN 10 MG: 5 TABLET, FILM COATED ORAL at 08:01

## 2024-12-31 NOTE — PROGRESS NOTES
Pulse oximetry assessment   93% at rest on room air   95% while ambulating on room air    Patient ambulated in room with walker from bed to window to the doorway back to the bed and patient was sating 95% on RA. Patient refused to walk in the hallway to evaluate. Patient reported no shortness of breath. Hr was 84.

## 2024-12-31 NOTE — PROGRESS NOTES
[facility] Quality Flow/Interdisciplinary Rounds Progress Note        Quality Flow Rounds held on December 31, 2024    Disciplines Attending:  Bedside Nurse, , , and Nursing Unit Leadership    Cammy Lopez was admitted on 12/26/2024 12:03 PM    Anticipated Discharge Date:       Disposition:    Hunter Score:  Hunter Scale Score: 21    BS RISK OF UNPLANNED READMISSION 2.0             15.1 Total Score        Discussed patient goal for the day, patient clinical progression, and barriers to discharge.  The following Goal(s) of the Day/Commitment(s) have been identified:   discharge today      Lucien Mendoza RN  December 31, 2024

## 2024-12-31 NOTE — CARE COORDINATION
Dc order in place. [facility] to initiate care today at 1 pm along w/[facility] who will provide the antibiotic. Nursing aware to skip todays afternoon dose as pt will receive at home. [place] aware they will use pt.'s port. Oncology cleared pt.'s port for antibiotic administration.  [facility] orders placed. Pt is calling for a ride. Will follow.  NATE Pierson, RN  [facility] Case Management  [phone]

## 2024-12-31 NOTE — PLAN OF CARE
Problem: ABCDS Injury Assessment  Goal: Absence of physical injury  12/31/2024 0931 by Ansley Holm RN  Outcome: Progressing  Flowsheets (Taken 12/31/2024 0815)  Absence of Physical Injury: Implement safety measures based on patient assessment  12/31/2024 0334 by Jessika Schumacher RN  Outcome: Progressing     Problem: Safety - Adult  Goal: Free from fall injury  12/31/2024 0931 by Ansley Holm RN  Outcome: Progressing  Flowsheets (Taken 12/31/2024 0815)  Free From Fall Injury: Instruct family/caregiver on patient safety  12/31/2024 0334 by Jessika Schumacher RN  Outcome: Progressing     Problem: Respiratory - Adult  Goal: Achieves optimal ventilation and oxygenation  12/31/2024 0931 by Ansley Holm RN  Outcome: Progressing  12/31/2024 0334 by Jessika Schumacher RN  Outcome: Progressing     Problem: Hematologic - Adult  Goal: Maintains hematologic stability  12/31/2024 0931 by Ansley Holm RN  Outcome: Progressing  12/31/2024 0334 by Jessika Schumacher RN  Outcome: Progressing

## 2024-12-31 NOTE — DISCHARGE SUMMARY
[facility] Hospitalist Physician Discharge Summary       No follow-up provider specified.    Activity level: As tolerated     Dispo: Home with home health      Condition on discharge: Stable     Patient ID:  Cammy Lopez  [id number]  69 y.o.  1955    Admit date: 12/26/2024    Discharge date and time:  12/31/2024  8:27 AM    Admission Diagnoses: Principal Problem:    Acute pulmonary embolism without acute cor pulmonale, unspecified pulmonary embolism type (HCC)  Resolved Problems:    * No resolved hospital problems. *      Discharge Diagnoses: Principal Problem:    Acute pulmonary embolism without acute cor pulmonale, unspecified pulmonary embolism type (HCC)  Resolved Problems:    * No resolved hospital problems. *      Consults:  IP CONSULT TO PULMONOLOGY  IP CONSULT TO INTERNAL MEDICINE  IP CONSULT TO INFECTIOUS DISEASES  IP CONSULT TO PHARMACY    Procedures:  Diagnostic and therapeutic thoracentesis     Hospital Course:   Patient Cammy Lopez is a 69 y.o. presented with Lung mass [R91.8]  Pleural effusion on left [J90]  Leukocytosis, unspecified type [D72.829]  Pneumonia due to infectious organism, unspecified laterality, unspecified part of lung [J18.9]  Acute pulmonary embolism without acute cor pulmonale, unspecified pulmonary embolism type (HCC) [I26.99]  Pulmonary embolism without acute cor pulmonale, unspecified chronicity, unspecified pulmonary embolism type (HCC) [I26.99]    This is a 69 year old female who presented with shortness of breath. She was found to have a PE, left pleural effusion and LUB pneumonia. Patient was seen by pulmonology. He did received diagnostic and therapeutic thoracentesis. Cultures positive for MSSA. He was seen by ID and is being discharged on ancef. Continue eliquis for PE.     Discharge Exam:    General Appearance: alert and oriented to person, place and time and in no acute distress  Skin: warm and dry  Head: normocephalic and atraumatic  Eyes:

## 2025-01-02 LAB
ALBUMIN SERPL-MCNC: 3 G/DL (ref 3.5–5.2)
ALP SERPL-CCNC: 109 U/L (ref 35–104)
ALT SERPL-CCNC: 5 U/L (ref 0–32)
ANION GAP SERPL CALCULATED.3IONS-SCNC: 12 MMOL/L (ref 7–16)
AST SERPL-CCNC: 17 U/L (ref 0–31)
BASOPHILS # BLD: 0.05 K/UL (ref 0–0.2)
BASOPHILS NFR BLD: 0 % (ref 0–2)
BILIRUB SERPL-MCNC: <0.2 MG/DL (ref 0–1.2)
BUN SERPL-MCNC: 11 MG/DL (ref 6–23)
CALCIUM SERPL-MCNC: 8.7 MG/DL (ref 8.6–10.2)
CHLORIDE SERPL-SCNC: 102 MMOL/L (ref 98–107)
CO2 SERPL-SCNC: 25 MMOL/L (ref 22–29)
CREAT SERPL-MCNC: 0.8 MG/DL (ref 0.5–1)
EOSINOPHIL # BLD: 0.01 K/UL (ref 0.05–0.5)
EOSINOPHILS RELATIVE PERCENT: 0 % (ref 0–6)
ERYTHROCYTE [DISTWIDTH] IN BLOOD BY AUTOMATED COUNT: 16 % (ref 11.5–15)
ERYTHROCYTE [SEDIMENTATION RATE] IN BLOOD BY WESTERGREN METHOD: 15 MM/HR (ref 0–20)
GFR, ESTIMATED: 76 ML/MIN/1.73M2
GLUCOSE SERPL-MCNC: 231 MG/DL (ref 74–99)
HCT VFR BLD AUTO: 34.3 % (ref 34–48)
HGB BLD-MCNC: 10.4 G/DL (ref 11.5–15.5)
IMM GRANULOCYTES # BLD AUTO: 0.15 K/UL (ref 0–0.58)
IMM GRANULOCYTES NFR BLD: 1 % (ref 0–5)
LYMPHOCYTES NFR BLD: 1.58 K/UL (ref 1.5–4)
LYMPHOCYTES RELATIVE PERCENT: 8 % (ref 20–42)
MCH RBC QN AUTO: 32.7 PG (ref 26–35)
MCHC RBC AUTO-ENTMCNC: 30.3 G/DL (ref 32–34.5)
MCV RBC AUTO: 107.9 FL (ref 80–99.9)
MONOCYTES NFR BLD: 0.99 K/UL (ref 0.1–0.95)
MONOCYTES NFR BLD: 5 % (ref 2–12)
NEUTROPHILS NFR BLD: 86 % (ref 43–80)
NEUTS SEG NFR BLD: 16.99 K/UL (ref 1.8–7.3)
PLATELET # BLD AUTO: 585 K/UL (ref 130–450)
PMV BLD AUTO: 10.3 FL (ref 7–12)
POTASSIUM SERPL-SCNC: 4.7 MMOL/L (ref 3.5–5)
PROT SERPL-MCNC: 5.6 G/DL (ref 6.4–8.3)
RBC # BLD AUTO: 3.18 M/UL (ref 3.5–5.5)
SODIUM SERPL-SCNC: 139 MMOL/L (ref 132–146)
WBC OTHER # BLD: 19.8 K/UL (ref 4.5–11.5)

## 2025-01-03 LAB — CRP SERPL HS-MCNC: 35 MG/L (ref 0–5)

## 2025-01-09 LAB
BASOPHILS # BLD: 0.11 K/UL (ref 0–0.2)
BASOPHILS NFR BLD: 1 % (ref 0–2)
CRP SERPL HS-MCNC: 48 MG/L (ref 0–5)
EOSINOPHIL # BLD: 0.05 K/UL (ref 0.05–0.5)
EOSINOPHILS RELATIVE PERCENT: 0 % (ref 0–6)
ERYTHROCYTE [DISTWIDTH] IN BLOOD BY AUTOMATED COUNT: 16.3 % (ref 11.5–15)
HCT VFR BLD AUTO: 36.3 % (ref 34–48)
HGB BLD-MCNC: 10.8 G/DL (ref 11.5–15.5)
IMM GRANULOCYTES # BLD AUTO: 0.06 K/UL (ref 0–0.58)
IMM GRANULOCYTES NFR BLD: 0 % (ref 0–5)
LYMPHOCYTES NFR BLD: 1.4 K/UL (ref 1.5–4)
LYMPHOCYTES RELATIVE PERCENT: 10 % (ref 20–42)
MCH RBC QN AUTO: 32.3 PG (ref 26–35)
MCHC RBC AUTO-ENTMCNC: 29.8 G/DL (ref 32–34.5)
MCV RBC AUTO: 108.7 FL (ref 80–99.9)
MONOCYTES NFR BLD: 0.92 K/UL (ref 0.1–0.95)
MONOCYTES NFR BLD: 7 % (ref 2–12)
NEUTROPHILS NFR BLD: 82 % (ref 43–80)
NEUTS SEG NFR BLD: 11.16 K/UL (ref 1.8–7.3)
PLATELET # BLD AUTO: 577 K/UL (ref 130–450)
PMV BLD AUTO: 10 FL (ref 7–12)
RBC # BLD AUTO: 3.34 M/UL (ref 3.5–5.5)
WBC OTHER # BLD: 13.7 K/UL (ref 4.5–11.5)

## 2025-01-10 LAB
ALBUMIN SERPL-MCNC: 3.1 G/DL (ref 3.5–5.2)
ALP SERPL-CCNC: 82 U/L (ref 35–104)
ALT SERPL-CCNC: 9 U/L (ref 0–32)
ANION GAP SERPL CALCULATED.3IONS-SCNC: 15 MMOL/L (ref 7–16)
AST SERPL-CCNC: 19 U/L (ref 0–31)
BILIRUB SERPL-MCNC: <0.2 MG/DL (ref 0–1.2)
BUN SERPL-MCNC: 25 MG/DL (ref 6–23)
CALCIUM SERPL-MCNC: 9 MG/DL (ref 8.6–10.2)
CHLORIDE SERPL-SCNC: 99 MMOL/L (ref 98–107)
CO2 SERPL-SCNC: 24 MMOL/L (ref 22–29)
CREAT SERPL-MCNC: 0.9 MG/DL (ref 0.5–1)
ERYTHROCYTE [SEDIMENTATION RATE] IN BLOOD BY WESTERGREN METHOD: 111 MM/HR (ref 0–20)
GFR, ESTIMATED: 74 ML/MIN/1.73M2
GLUCOSE SERPL-MCNC: 188 MG/DL (ref 74–99)
POTASSIUM SERPL-SCNC: 4.7 MMOL/L (ref 3.5–5)
PROT SERPL-MCNC: 6.1 G/DL (ref 6.4–8.3)
SODIUM SERPL-SCNC: 138 MMOL/L (ref 132–146)

## 2025-01-13 LAB
BASOPHILS # BLD: 0.08 K/UL (ref 0–0.2)
BASOPHILS NFR BLD: 1 % (ref 0–2)
EOSINOPHIL # BLD: 0.05 K/UL (ref 0.05–0.5)
EOSINOPHILS RELATIVE PERCENT: 1 % (ref 0–6)
ERYTHROCYTE [DISTWIDTH] IN BLOOD BY AUTOMATED COUNT: 15.7 % (ref 11.5–15)
HCT VFR BLD AUTO: 33.9 % (ref 34–48)
HGB BLD-MCNC: 10.4 G/DL (ref 11.5–15.5)
IMM GRANULOCYTES # BLD AUTO: 0.03 K/UL (ref 0–0.58)
IMM GRANULOCYTES NFR BLD: 0 % (ref 0–5)
LYMPHOCYTES NFR BLD: 1.79 K/UL (ref 1.5–4)
LYMPHOCYTES RELATIVE PERCENT: 17 % (ref 20–42)
MCH RBC QN AUTO: 32.6 PG (ref 26–35)
MCHC RBC AUTO-ENTMCNC: 30.7 G/DL (ref 32–34.5)
MCV RBC AUTO: 106.3 FL (ref 80–99.9)
MONOCYTES NFR BLD: 0.75 K/UL (ref 0.1–0.95)
MONOCYTES NFR BLD: 7 % (ref 2–12)
NEUTROPHILS NFR BLD: 74 % (ref 43–80)
NEUTS SEG NFR BLD: 7.79 K/UL (ref 1.8–7.3)
PLATELET # BLD AUTO: 468 K/UL (ref 130–450)
PMV BLD AUTO: 10.5 FL (ref 7–12)
RBC # BLD AUTO: 3.19 M/UL (ref 3.5–5.5)
WBC OTHER # BLD: 10.5 K/UL (ref 4.5–11.5)

## 2025-01-14 ENCOUNTER — TELEPHONE (OUTPATIENT)
Dept: ONCOLOGY | Age: 70
End: 2025-01-14

## 2025-01-14 LAB
ALBUMIN SERPL-MCNC: 3.3 G/DL (ref 3.5–5.2)
ALP SERPL-CCNC: 76 U/L (ref 35–104)
ALT SERPL-CCNC: <5 U/L (ref 0–32)
ANION GAP SERPL CALCULATED.3IONS-SCNC: 12 MMOL/L (ref 7–16)
AST SERPL-CCNC: 13 U/L (ref 0–31)
BILIRUB SERPL-MCNC: 0.2 MG/DL (ref 0–1.2)
BUN SERPL-MCNC: 21 MG/DL (ref 6–23)
CALCIUM SERPL-MCNC: 8.7 MG/DL (ref 8.6–10.2)
CHLORIDE SERPL-SCNC: 102 MMOL/L (ref 98–107)
CO2 SERPL-SCNC: 24 MMOL/L (ref 22–29)
CREAT SERPL-MCNC: 0.8 MG/DL (ref 0.5–1)
GFR, ESTIMATED: 80 ML/MIN/1.73M2
GLUCOSE SERPL-MCNC: 136 MG/DL (ref 74–99)
POTASSIUM SERPL-SCNC: 4.9 MMOL/L (ref 3.5–5)
PROT SERPL-MCNC: 6.3 G/DL (ref 6.4–8.3)
SODIUM SERPL-SCNC: 138 MMOL/L (ref 132–146)

## 2025-01-14 NOTE — TELEPHONE ENCOUNTER
Received call from pt's sister re: medication assistance.  Pt is 69-year-old female being managed for metastatic lung cancer.  Pt's sister reported that pt is on Eliquis prescribed by non-[facility] provider.  Referral information provided for Prescription Assistance for additional support.  No additional needs identified at this time.    ANGELO Carter, CINDY-S  Oncology Social Worker   [phone]

## 2025-01-16 LAB
ALBUMIN SERPL-MCNC: 3.3 G/DL (ref 3.5–5.2)
ALP SERPL-CCNC: 74 U/L (ref 35–104)
ALT SERPL-CCNC: <5 U/L (ref 0–32)
ANION GAP SERPL CALCULATED.3IONS-SCNC: 10 MMOL/L (ref 7–16)
AST SERPL-CCNC: 13 U/L (ref 0–31)
BASOPHILS # BLD: 0.09 K/UL (ref 0–0.2)
BASOPHILS NFR BLD: 1 % (ref 0–2)
BILIRUB SERPL-MCNC: <0.2 MG/DL (ref 0–1.2)
BUN SERPL-MCNC: 18 MG/DL (ref 6–23)
CALCIUM SERPL-MCNC: 8.2 MG/DL (ref 8.6–10.2)
CHLORIDE SERPL-SCNC: 103 MMOL/L (ref 98–107)
CO2 SERPL-SCNC: 24 MMOL/L (ref 22–29)
CREAT SERPL-MCNC: 0.7 MG/DL (ref 0.5–1)
CRP SERPL HS-MCNC: 43 MG/L (ref 0–5)
EOSINOPHIL # BLD: 0.09 K/UL (ref 0.05–0.5)
EOSINOPHILS RELATIVE PERCENT: 1 % (ref 0–6)
ERYTHROCYTE [DISTWIDTH] IN BLOOD BY AUTOMATED COUNT: 15.5 % (ref 11.5–15)
GFR, ESTIMATED: >90 ML/MIN/1.73M2
GLUCOSE SERPL-MCNC: 235 MG/DL (ref 74–99)
HCT VFR BLD AUTO: 33.9 % (ref 34–48)
HGB BLD-MCNC: 10.3 G/DL (ref 11.5–15.5)
IMM GRANULOCYTES # BLD AUTO: 0.05 K/UL (ref 0–0.58)
IMM GRANULOCYTES NFR BLD: 0 % (ref 0–5)
LYMPHOCYTES NFR BLD: 1.09 K/UL (ref 1.5–4)
LYMPHOCYTES RELATIVE PERCENT: 10 % (ref 20–42)
MCH RBC QN AUTO: 32.6 PG (ref 26–35)
MCHC RBC AUTO-ENTMCNC: 30.4 G/DL (ref 32–34.5)
MCV RBC AUTO: 107.3 FL (ref 80–99.9)
MONOCYTES NFR BLD: 0.55 K/UL (ref 0.1–0.95)
MONOCYTES NFR BLD: 5 % (ref 2–12)
NEUTROPHILS NFR BLD: 83 % (ref 43–80)
NEUTS SEG NFR BLD: 9.3 K/UL (ref 1.8–7.3)
PLATELET # BLD AUTO: 430 K/UL (ref 130–450)
PMV BLD AUTO: 10.3 FL (ref 7–12)
POTASSIUM SERPL-SCNC: 4.9 MMOL/L (ref 3.5–5)
PROT SERPL-MCNC: 6.1 G/DL (ref 6.4–8.3)
RBC # BLD AUTO: 3.16 M/UL (ref 3.5–5.5)
SODIUM SERPL-SCNC: 137 MMOL/L (ref 132–146)
WBC OTHER # BLD: 11.2 K/UL (ref 4.5–11.5)

## 2025-01-17 LAB — ERYTHROCYTE [SEDIMENTATION RATE] IN BLOOD BY WESTERGREN METHOD: 88 MM/HR (ref 0–20)

## 2025-01-20 LAB
ALBUMIN SERPL-MCNC: 3.4 G/DL (ref 3.5–5.2)
ALP SERPL-CCNC: 76 U/L (ref 35–104)
ALT SERPL-CCNC: <5 U/L (ref 0–32)
ANION GAP SERPL CALCULATED.3IONS-SCNC: 14 MMOL/L (ref 7–16)
AST SERPL-CCNC: 13 U/L (ref 0–31)
BASOPHILS # BLD: 0.08 K/UL (ref 0–0.2)
BASOPHILS NFR BLD: 1 % (ref 0–2)
BILIRUB SERPL-MCNC: <0.2 MG/DL (ref 0–1.2)
BUN SERPL-MCNC: 23 MG/DL (ref 6–23)
CALCIUM SERPL-MCNC: 8 MG/DL (ref 8.6–10.2)
CHLORIDE SERPL-SCNC: 99 MMOL/L (ref 98–107)
CO2 SERPL-SCNC: 25 MMOL/L (ref 22–29)
CREAT SERPL-MCNC: 0.9 MG/DL (ref 0.5–1)
CRP SERPL HS-MCNC: 57 MG/L (ref 0–5)
EOSINOPHIL # BLD: 0.06 K/UL (ref 0.05–0.5)
EOSINOPHILS RELATIVE PERCENT: 1 % (ref 0–6)
ERYTHROCYTE [DISTWIDTH] IN BLOOD BY AUTOMATED COUNT: 15.5 % (ref 11.5–15)
GFR, ESTIMATED: 71 ML/MIN/1.73M2
GLUCOSE SERPL-MCNC: 263 MG/DL (ref 74–99)
HCT VFR BLD AUTO: 33.8 % (ref 34–48)
HGB BLD-MCNC: 10.4 G/DL (ref 11.5–15.5)
IMM GRANULOCYTES # BLD AUTO: 0.04 K/UL (ref 0–0.58)
IMM GRANULOCYTES NFR BLD: 0 % (ref 0–5)
LYMPHOCYTES NFR BLD: 1.49 K/UL (ref 1.5–4)
LYMPHOCYTES RELATIVE PERCENT: 14 % (ref 20–42)
MCH RBC QN AUTO: 32.8 PG (ref 26–35)
MCHC RBC AUTO-ENTMCNC: 30.8 G/DL (ref 32–34.5)
MCV RBC AUTO: 106.6 FL (ref 80–99.9)
MONOCYTES NFR BLD: 0.88 K/UL (ref 0.1–0.95)
MONOCYTES NFR BLD: 8 % (ref 2–12)
NEUTROPHILS NFR BLD: 76 % (ref 43–80)
NEUTS SEG NFR BLD: 8.27 K/UL (ref 1.8–7.3)
PLATELET # BLD AUTO: 406 K/UL (ref 130–450)
PMV BLD AUTO: 10.5 FL (ref 7–12)
POTASSIUM SERPL-SCNC: 4.6 MMOL/L (ref 3.5–5)
PROT SERPL-MCNC: 6.1 G/DL (ref 6.4–8.3)
RBC # BLD AUTO: 3.17 M/UL (ref 3.5–5.5)
SODIUM SERPL-SCNC: 138 MMOL/L (ref 132–146)
WBC OTHER # BLD: 10.8 K/UL (ref 4.5–11.5)

## 2025-01-21 LAB — ERYTHROCYTE [SEDIMENTATION RATE] IN BLOOD BY WESTERGREN METHOD: 109 MM/HR (ref 0–20)

## 2025-01-22 ENCOUNTER — HOSPITAL ENCOUNTER (INPATIENT)
Age: 70
LOS: 1 days | Discharge: HOME HEALTH | DRG: 181 | End: 2025-01-23
Attending: EMERGENCY MEDICINE | Admitting: INTERNAL MEDICINE
Payer: COMMERCIAL

## 2025-01-22 ENCOUNTER — APPOINTMENT (OUTPATIENT)
Dept: GENERAL RADIOLOGY | Age: 70
DRG: 181 | End: 2025-01-22
Payer: COMMERCIAL

## 2025-01-22 DIAGNOSIS — I48.91 ATRIAL FIBRILLATION WITH RVR (HCC): Primary | ICD-10-CM

## 2025-01-22 LAB
ALBUMIN SERPL-MCNC: 3 G/DL (ref 3.5–5.2)
ALP SERPL-CCNC: 63 U/L (ref 35–104)
ALT SERPL-CCNC: <5 U/L (ref 0–32)
ANION GAP SERPL CALCULATED.3IONS-SCNC: 13 MMOL/L (ref 7–16)
AST SERPL-CCNC: 15 U/L (ref 0–31)
BASOPHILS # BLD: 0.05 K/UL (ref 0–0.2)
BASOPHILS NFR BLD: 0 % (ref 0–2)
BILIRUB SERPL-MCNC: 0.2 MG/DL (ref 0–1.2)
BNP SERPL-MCNC: 732 PG/ML (ref 0–125)
BUN SERPL-MCNC: 24 MG/DL (ref 6–23)
CALCIUM SERPL-MCNC: 7.8 MG/DL (ref 8.6–10.2)
CHLORIDE SERPL-SCNC: 99 MMOL/L (ref 98–107)
CO2 SERPL-SCNC: 23 MMOL/L (ref 22–29)
CREAT SERPL-MCNC: 1 MG/DL (ref 0.5–1)
EOSINOPHIL # BLD: 0.03 K/UL (ref 0.05–0.5)
EOSINOPHILS RELATIVE PERCENT: 0 % (ref 0–6)
ERYTHROCYTE [DISTWIDTH] IN BLOOD BY AUTOMATED COUNT: 15.1 % (ref 11.5–15)
GFR, ESTIMATED: 65 ML/MIN/1.73M2
GLUCOSE BLD-MCNC: 106 MG/DL (ref 74–99)
GLUCOSE BLD-MCNC: 258 MG/DL (ref 74–99)
GLUCOSE SERPL-MCNC: 222 MG/DL (ref 74–99)
HCT VFR BLD AUTO: 34.4 % (ref 34–48)
HGB BLD-MCNC: 10.6 G/DL (ref 11.5–15.5)
IMM GRANULOCYTES # BLD AUTO: 0.08 K/UL (ref 0–0.58)
IMM GRANULOCYTES NFR BLD: 1 % (ref 0–5)
LYMPHOCYTES NFR BLD: 0.86 K/UL (ref 1.5–4)
LYMPHOCYTES RELATIVE PERCENT: 8 % (ref 20–42)
MAGNESIUM SERPL-MCNC: 2.1 MG/DL (ref 1.6–2.6)
MCH RBC QN AUTO: 32.8 PG (ref 26–35)
MCHC RBC AUTO-ENTMCNC: 30.8 G/DL (ref 32–34.5)
MCV RBC AUTO: 106.5 FL (ref 80–99.9)
MONOCYTES NFR BLD: 0.65 K/UL (ref 0.1–0.95)
MONOCYTES NFR BLD: 6 % (ref 2–12)
NEUTROPHILS NFR BLD: 85 % (ref 43–80)
NEUTS SEG NFR BLD: 9.74 K/UL (ref 1.8–7.3)
PLATELET # BLD AUTO: 357 K/UL (ref 130–450)
PMV BLD AUTO: 9.2 FL (ref 7–12)
POTASSIUM SERPL-SCNC: 4.8 MMOL/L (ref 3.5–5)
PROT SERPL-MCNC: 6.3 G/DL (ref 6.4–8.3)
RBC # BLD AUTO: 3.23 M/UL (ref 3.5–5.5)
SODIUM SERPL-SCNC: 135 MMOL/L (ref 132–146)
TROPONIN I SERPL HS-MCNC: 101 NG/L (ref 0–9)
TROPONIN I SERPL HS-MCNC: 96 NG/L (ref 0–9)
WBC OTHER # BLD: 11.4 K/UL (ref 4.5–11.5)

## 2025-01-22 PROCEDURE — 71045 X-RAY EXAM CHEST 1 VIEW: CPT

## 2025-01-22 PROCEDURE — 1200000000 HC SEMI PRIVATE

## 2025-01-22 PROCEDURE — 83880 ASSAY OF NATRIURETIC PEPTIDE: CPT

## 2025-01-22 PROCEDURE — 84484 ASSAY OF TROPONIN QUANT: CPT

## 2025-01-22 PROCEDURE — 6360000002 HC RX W HCPCS: Performed by: EMERGENCY MEDICINE

## 2025-01-22 PROCEDURE — 85025 COMPLETE CBC W/AUTO DIFF WBC: CPT

## 2025-01-22 PROCEDURE — 2500000003 HC RX 250 WO HCPCS: Performed by: INTERNAL MEDICINE

## 2025-01-22 PROCEDURE — 83735 ASSAY OF MAGNESIUM: CPT

## 2025-01-22 PROCEDURE — 80053 COMPREHEN METABOLIC PANEL: CPT

## 2025-01-22 PROCEDURE — 93005 ELECTROCARDIOGRAM TRACING: CPT | Performed by: EMERGENCY MEDICINE

## 2025-01-22 PROCEDURE — 2500000003 HC RX 250 WO HCPCS: Performed by: EMERGENCY MEDICINE

## 2025-01-22 PROCEDURE — 2580000003 HC RX 258: Performed by: EMERGENCY MEDICINE

## 2025-01-22 PROCEDURE — 99291 CRITICAL CARE FIRST HOUR: CPT

## 2025-01-22 PROCEDURE — 96360 HYDRATION IV INFUSION INIT: CPT

## 2025-01-22 PROCEDURE — 6370000000 HC RX 637 (ALT 250 FOR IP): Performed by: INTERNAL MEDICINE

## 2025-01-22 PROCEDURE — 82962 GLUCOSE BLOOD TEST: CPT

## 2025-01-22 RX ORDER — FENTANYL 25 UG/1
1 PATCH TRANSDERMAL
Status: DISCONTINUED | OUTPATIENT
Start: 2025-01-23 | End: 2025-01-23 | Stop reason: HOSPADM

## 2025-01-22 RX ORDER — ONDANSETRON 4 MG/1
4 TABLET, ORALLY DISINTEGRATING ORAL EVERY 8 HOURS PRN
Status: DISCONTINUED | OUTPATIENT
Start: 2025-01-22 | End: 2025-01-23 | Stop reason: HOSPADM

## 2025-01-22 RX ORDER — ESCITALOPRAM OXALATE 10 MG/1
10 TABLET ORAL DAILY
Status: DISCONTINUED | OUTPATIENT
Start: 2025-01-22 | End: 2025-01-23

## 2025-01-22 RX ORDER — ACETAMINOPHEN 325 MG/1
650 TABLET ORAL EVERY 6 HOURS PRN
Status: DISCONTINUED | OUTPATIENT
Start: 2025-01-22 | End: 2025-01-23 | Stop reason: HOSPADM

## 2025-01-22 RX ORDER — SODIUM CHLORIDE 0.9 % (FLUSH) 0.9 %
5-40 SYRINGE (ML) INJECTION EVERY 12 HOURS SCHEDULED
Status: DISCONTINUED | OUTPATIENT
Start: 2025-01-22 | End: 2025-01-23 | Stop reason: HOSPADM

## 2025-01-22 RX ORDER — FOLIC ACID 1 MG/1
1 TABLET ORAL DAILY
Status: DISCONTINUED | OUTPATIENT
Start: 2025-01-22 | End: 2025-01-23 | Stop reason: HOSPADM

## 2025-01-22 RX ORDER — OXYCODONE AND ACETAMINOPHEN 10; 325 MG/1; MG/1
1 TABLET ORAL EVERY 6 HOURS PRN
Status: DISCONTINUED | OUTPATIENT
Start: 2025-01-22 | End: 2025-01-23 | Stop reason: HOSPADM

## 2025-01-22 RX ORDER — PREDNISONE 5 MG/1
10 TABLET ORAL DAILY
Status: DISCONTINUED | OUTPATIENT
Start: 2025-01-22 | End: 2025-01-23 | Stop reason: HOSPADM

## 2025-01-22 RX ORDER — GABAPENTIN 300 MG/1
300 CAPSULE ORAL NIGHTLY
Status: DISCONTINUED | OUTPATIENT
Start: 2025-01-22 | End: 2025-01-23

## 2025-01-22 RX ORDER — POTASSIUM CHLORIDE 1500 MG/1
40 TABLET, EXTENDED RELEASE ORAL PRN
Status: DISCONTINUED | OUTPATIENT
Start: 2025-01-22 | End: 2025-01-23 | Stop reason: HOSPADM

## 2025-01-22 RX ORDER — INSULIN LISPRO 100 [IU]/ML
0-8 INJECTION, SOLUTION INTRAVENOUS; SUBCUTANEOUS
Status: DISCONTINUED | OUTPATIENT
Start: 2025-01-22 | End: 2025-01-23

## 2025-01-22 RX ORDER — PROCHLORPERAZINE MALEATE 10 MG
5 TABLET ORAL EVERY 6 HOURS PRN
Status: DISCONTINUED | OUTPATIENT
Start: 2025-01-22 | End: 2025-01-23 | Stop reason: HOSPADM

## 2025-01-22 RX ORDER — MAGNESIUM SULFATE IN WATER 40 MG/ML
2000 INJECTION, SOLUTION INTRAVENOUS PRN
Status: DISCONTINUED | OUTPATIENT
Start: 2025-01-22 | End: 2025-01-23 | Stop reason: HOSPADM

## 2025-01-22 RX ORDER — SODIUM CHLORIDE 9 MG/ML
INJECTION, SOLUTION INTRAVENOUS PRN
Status: DISCONTINUED | OUTPATIENT
Start: 2025-01-22 | End: 2025-01-23 | Stop reason: HOSPADM

## 2025-01-22 RX ORDER — ONDANSETRON 2 MG/ML
4 INJECTION INTRAMUSCULAR; INTRAVENOUS EVERY 6 HOURS PRN
Status: DISCONTINUED | OUTPATIENT
Start: 2025-01-22 | End: 2025-01-23 | Stop reason: HOSPADM

## 2025-01-22 RX ORDER — SODIUM CHLORIDE 0.9 % (FLUSH) 0.9 %
5-40 SYRINGE (ML) INJECTION PRN
Status: DISCONTINUED | OUTPATIENT
Start: 2025-01-22 | End: 2025-01-23 | Stop reason: HOSPADM

## 2025-01-22 RX ORDER — ACETAMINOPHEN 650 MG/1
650 SUPPOSITORY RECTAL EVERY 6 HOURS PRN
Status: DISCONTINUED | OUTPATIENT
Start: 2025-01-22 | End: 2025-01-23 | Stop reason: HOSPADM

## 2025-01-22 RX ORDER — PRIMIDONE 50 MG/1
100 TABLET ORAL NIGHTLY
Status: DISCONTINUED | OUTPATIENT
Start: 2025-01-22 | End: 2025-01-23 | Stop reason: HOSPADM

## 2025-01-22 RX ORDER — POLYETHYLENE GLYCOL 3350 17 G/17G
17 POWDER, FOR SOLUTION ORAL DAILY PRN
Status: DISCONTINUED | OUTPATIENT
Start: 2025-01-22 | End: 2025-01-23 | Stop reason: HOSPADM

## 2025-01-22 RX ORDER — DEXTROSE MONOHYDRATE 100 MG/ML
INJECTION, SOLUTION INTRAVENOUS CONTINUOUS PRN
Status: DISCONTINUED | OUTPATIENT
Start: 2025-01-22 | End: 2025-01-23 | Stop reason: HOSPADM

## 2025-01-22 RX ORDER — POTASSIUM CHLORIDE 7.45 MG/ML
10 INJECTION INTRAVENOUS PRN
Status: DISCONTINUED | OUTPATIENT
Start: 2025-01-22 | End: 2025-01-23 | Stop reason: HOSPADM

## 2025-01-22 RX ORDER — 0.9 % SODIUM CHLORIDE 0.9 %
500 INTRAVENOUS SOLUTION INTRAVENOUS ONCE
Status: COMPLETED | OUTPATIENT
Start: 2025-01-22 | End: 2025-01-22

## 2025-01-22 RX ORDER — GLUCAGON 1 MG/ML
1 KIT INJECTION PRN
Status: DISCONTINUED | OUTPATIENT
Start: 2025-01-22 | End: 2025-01-23 | Stop reason: HOSPADM

## 2025-01-22 RX ADMIN — APIXABAN 5 MG: 5 TABLET, FILM COATED ORAL at 21:13

## 2025-01-22 RX ADMIN — SODIUM CHLORIDE 500 ML: 9 INJECTION, SOLUTION INTRAVENOUS at 11:10

## 2025-01-22 RX ADMIN — INSULIN LISPRO 4 UNITS: 100 INJECTION, SOLUTION INTRAVENOUS; SUBCUTANEOUS at 17:37

## 2025-01-22 RX ADMIN — CEFAZOLIN 2000 MG: 2 INJECTION, POWDER, FOR SOLUTION INTRAMUSCULAR; INTRAVENOUS at 16:23

## 2025-01-22 RX ADMIN — SODIUM CHLORIDE, PRESERVATIVE FREE 10 ML: 5 INJECTION INTRAVENOUS at 21:17

## 2025-01-22 RX ADMIN — GABAPENTIN 300 MG: 300 CAPSULE ORAL at 21:14

## 2025-01-22 RX ADMIN — PRIMIDONE 100 MG: 50 TABLET ORAL at 21:14

## 2025-01-22 ASSESSMENT — PAIN SCALES - GENERAL: PAINLEVEL_OUTOF10: 0

## 2025-01-22 ASSESSMENT — ENCOUNTER SYMPTOMS
CHEST TIGHTNESS: 0
SHORTNESS OF BREATH: 0

## 2025-01-22 ASSESSMENT — LIFESTYLE VARIABLES
HOW MANY STANDARD DRINKS CONTAINING ALCOHOL DO YOU HAVE ON A TYPICAL DAY: PATIENT DOES NOT DRINK
HOW OFTEN DO YOU HAVE A DRINK CONTAINING ALCOHOL: NEVER

## 2025-01-22 ASSESSMENT — PAIN - FUNCTIONAL ASSESSMENT: PAIN_FUNCTIONAL_ASSESSMENT: NONE - DENIES PAIN

## 2025-01-22 NOTE — ED NOTES
ED to Inpatient Handoff Report    Notified RN that electronic handoff available and patient ready for transport to room 528.    Safety Risks: None identified    Patient in Restraints: no    Constant Observer or Patient : no    Telemetry Monitoring Ordered :Yes      Cardiac Rhythm: A fib RVR now converted to NSR    Order to transfer to unit without monitor:NO    Deterioration Index Score:   Predictive Model Details          29 (Normal)  Factor Value    Calculated 1/22/2025 15:02 38% Age 69 years old    Deterioration Index Model 14% Cardiac rhythm A fib RVR     14% Potassium 4.8 mmol/L     9% Systolic 101     8% Respiratory rate 18     7% WBC count 11.4 k/uL     5% Pulse oximetry 100 %     3% BUN abnormal (24 mg/dL)     3% Sodium 135 mmol/L     0% Temperature 97.3 °F (36.3 °C)     0% Hematocrit 34.4 %     0% Pulse 72        Vitals:    01/22/25 1051 01/22/25 1115 01/22/25 1145 01/22/25 1215   BP: (!) 87/66 97/68 105/69 101/80   Pulse: 87 85 73 72   Resp: 24 22 15 18   Temp: 97.3 °F (36.3 °C)      SpO2: 100% 100% 100% 100%   Weight:       Height:             Opportunity for questions and clarification was provided.

## 2025-01-22 NOTE — ED NOTES
-Patient is on cefazolin 2 g IV q8h for a minimum of 4 weeks, per Dr. Martines note on 12/30/2024.  -Patient brought in her own cefazolin, which was brought to pharmacy.  -Patient's last dose was today at 0800.  -Will place standing order for cefazolin 2 g IV q8h to begin today at 1600 per Dr. Pruett.

## 2025-01-22 NOTE — PROGRESS NOTES
4 Eyes Skin Assessment     NAME:  Cammy Lopez  YOB: 1955  MEDICAL RECORD NUMBER:  [id number]    The patient is being assessed for  Admission    I agree that at least one RN has performed a thorough Head to Toe Skin Assessment on the patient. ALL assessment sites listed below have been assessed.      Areas assessed by both nurses:    Head, Face, Ears, Shoulders, Back, Chest, Arms, Elbows, Hands, Sacrum. Buttock, Coccyx, Ischium, Legs. Feet and Heels, and Under Medical Devices         Does the Patient have a Wound? No noted wound(s)       Hunter Prevention initiated by RN: No  Wound Care Orders initiated by RN: No    Pressure Injury (Stage 3,4, Unstageable, DTI, NWPT, and Complex wounds) if present, place Wound referral order by RN under : No    New Ostomies, if present place, Ostomy referral order under : No     Nurse 1 eSignature: Electronically signed by Guzman Ashley RN on 1/22/25 at 5:43 PM EST    **SHARE this note so that the co-signing nurse can place an eSignature**    Nurse 2 eSignature: Electronically signed by beata allen RN on 1/22/25 at 5:46 PM EST

## 2025-01-22 NOTE — PROGRESS NOTES
[facility]  [facility]  Consultation Note     Admit Date: 1/22/2025  9:45 AM    Reason for Consult:   Infected pleural effusion    Attending Physician:  Amrik Pruett DO    HISTORY OF PRESENT ILLNESS:             The history is obtained from extensive review of available past medical records. The patient is a 69 y.o. female who is known to the ID service.    The patient was admitted last month with an infected pleural effusion with MSSA.  She is currently on Cefazolin.  The patient presented to [facility] on 1/22/2025 with palpitations.  She was found to be in atrial fibrillation with rapid ventricular response.  Cefazolin was resumed.  I was notified that the patient was in the ED so I came to assess her.    Past Medical History:    12/26/2024.  Mated to [facility] with shortness of breath.  She had a left pleural effusion and underwent a thoracentesis.  Gram stain showed MSSA.  Seen by ID.  Treated with Cefazolin.        Diagnosis Date    Hypertension     Lacrimal duct stenosis, bilateral 12/2023    S/p endoscopic DCR at [facility].    Malignant neoplasm of lower lobe of left lung (HCC) 02/14/2023    Adenocarcinoma     Secondary cancer of bone (HCC) 03/31/2023    Right sacral mass bone biopsy: metastatic adenocarcinoma.     Secondary cancer of brain (HCC) 03/22/2023    Secondary cancer of brain (HCC) 03/07/2024    2 new CNS metastases on MRI Brain.    Status post stereotactic radiosurgery 04/18/2023    SRS to right lateral frontal met, 2400 cGy/ 1 fraction.    Status post stereotactic radiosurgery 04/20/2023    SRS to right high posterior frontal met, 2400 cGy/ 1 fraction.    Status post stereotactic radiosurgery 04/26/2023    SRS to right sacrum bone met, 2400 cGy/ 4 fractions completed 05/01/2023.    Status post stereotactic radiosurgery 03/29/2024    SRS to left frontal met, 2400 cGy/ 1 fraction.    Status post stereotactic radiosurgery 03/29/2024    SRS to

## 2025-01-22 NOTE — H&P
[facility] Hospitalist Group History and Physical          PCP: Jovani Santos DO    Date of Admission: 1/22/2025    Date of Service: Pt seen/examined on 1/22/2025 and is admitted to Inpatient with expected LOS greater than two midnights due to medical therapy.  Placed inpatient    Chief Complaint:  had no chief complaint listed for this encounter.    History Of Present Illness:    Ms. Cammy Lopez, a 69 y.o. year old female  who  has a past medical history of Hypertension, Lacrimal duct stenosis, bilateral, Malignant neoplasm of lower lobe of left lung (HCC), Secondary cancer of bone (HCC), Secondary cancer of brain (HCC), Secondary cancer of brain (HCC), Status post stereotactic radiosurgery, Status post stereotactic radiosurgery, Status post stereotactic radiosurgery, Status post stereotactic radiosurgery, Status post stereotactic radiosurgery, and Thyroid disease.     Ms. Lopez is a 69-year-old female with past medical history of former tobacco use, hypertension, metastatic lung cancer with pleural effusions,, chronic back pain, paroxysmal A-fib, PE on Eliquis who presents emergency department with palpitations, generalized weakness, dizziness, generalized fatigue.  Felt similar symptoms yesterday but had not resolved spontaneously, this morning when she woke up she felt dizziness, weakness, palpitations.  Denies any visual changes, dysarthria, dysphagia, falls, headache, cough, shortness of breath, fever.  Denies any signs of bleeding, urinary symptoms.  Denies abdominal pain, nausea, vomiting.  patient was recently hospitalized, discharged on 12/31/2024.  Has been receiving Ancef 3 times a day managed by infectious disease for recent MSSA infection.  Follows with oncology and has not received treatment for at least 3 weeks due to intolerance to chemotherapy.    Upon arrival patient was slightly hypotensive, tachycardic heart rate 160s, EKG showed atrial fibrillation with RVR, initial troponin

## 2025-01-22 NOTE — ED PROVIDER NOTES
69-year-old female presenting with concern about a rapid heart rate.  She says yesterday she began to feel shaky and weak.  She uses a walker, she sees oncology for known cancer and was getting chemotherapy treatment.  Comes in by EMS, heart rate elevated and rapid they report.         Family History   Problem Relation Age of Onset    Cancer Mother         pancreatic    Cancer Father         lung     Past Surgical History:   Procedure Laterality Date    BRONCHOSCOPY N/A 3/7/2023    BRONCHOSCOPY ENDOBRONCHIAL ULTRASOUND, TBB, FNA,TBL, CYTOLOGY [place] [facility] [place]    BRONCHOSCOPY N/A 3/7/2023    BRONCHOSCOPY BRUSHINGS performed by Raj Keen [place] [facility] [place]    BRONCHOSCOPY  3/7/2023    BRONCHOSCOPY W/EBUS FNA performed by Raj Keen MD [place] [facility] [place]    BRONCHOSCOPY  3/7/2023    BRONCHOSCOPY DIAGNOSTIC OR CELL WASH ONLY performed by Raj Keen MD at [facility] [place]    BRONCHOSCOPY  3/7/2023    BRONCHOSCOPY BIOPSY BRONCHUS performed by Raj Keen MD at [facility] [place]    CT BIOPSY DEEP BONE PERCUTANEOUS  3/31/2023    CT BIOPSY PERCUTANEOUS DEEP BONE 3/31/2023 Celestino Workman MD [facility] [place]    PORT SURGERY Right 7/12/2023    PORT INSERTION performed by Emili Alberts [place] [facility] [place]       Review of Systems   Constitutional:  Negative for chills and fever.   Respiratory:  Negative for chest tightness and shortness of breath.    Cardiovascular:  Positive for palpitations.   Neurological:  Positive for weakness.        Physical Exam  Constitutional:       General: She is not in acute distress.     Appearance: She is well-developed.   HENT:      Head: Normocephalic and atraumatic.   Eyes:      Conjunctiva/sclera: Conjunctivae normal.      Pupils: Pupils are equal, round, and reactive to light.   Neck:      Thyroid: No thyromegaly.   Cardiovascular:      Rate and Rhythm: Tachycardia present. Rhythm irregular.   Pulmonary:      Effort: Pulmonary

## 2025-01-23 ENCOUNTER — APPOINTMENT (OUTPATIENT)
Dept: GENERAL RADIOLOGY | Age: 70
DRG: 181 | End: 2025-01-23
Payer: COMMERCIAL

## 2025-01-23 ENCOUNTER — APPOINTMENT (OUTPATIENT)
Dept: ULTRASOUND IMAGING | Age: 70
DRG: 181 | End: 2025-01-23
Payer: COMMERCIAL

## 2025-01-23 VITALS
RESPIRATION RATE: 18 BRPM | HEART RATE: 72 BPM | SYSTOLIC BLOOD PRESSURE: 125 MMHG | TEMPERATURE: 97.9 F | OXYGEN SATURATION: 94 % | DIASTOLIC BLOOD PRESSURE: 79 MMHG | BODY MASS INDEX: 26.31 KG/M2 | HEIGHT: 65 IN | WEIGHT: 157.9 LBS

## 2025-01-23 LAB
ANION GAP SERPL CALCULATED.3IONS-SCNC: 9 MMOL/L (ref 7–16)
APPEARANCE FLD: NORMAL
BASOPHILS # BLD: 0.09 K/UL (ref 0–0.2)
BASOPHILS NFR BLD: 1 % (ref 0–2)
BODY FLD TYPE: NORMAL
BUN SERPL-MCNC: 19 MG/DL (ref 6–23)
CALCIUM SERPL-MCNC: 8.3 MG/DL (ref 8.6–10.2)
CHLORIDE SERPL-SCNC: 100 MMOL/L (ref 98–107)
CHOLESTEROL FLUID: 90 MG/DL
CLOT CHECK: NORMAL
CO2 SERPL-SCNC: 26 MMOL/L (ref 22–29)
COLOR FLD: YELLOW
CREAT SERPL-MCNC: 0.7 MG/DL (ref 0.5–1)
EOSINOPHIL # BLD: 0.23 K/UL (ref 0.05–0.5)
EOSINOPHILS RELATIVE PERCENT: 3 % (ref 0–6)
ERYTHROCYTE [DISTWIDTH] IN BLOOD BY AUTOMATED COUNT: 15.3 % (ref 11.5–15)
GFR, ESTIMATED: >90 ML/MIN/1.73M2
GLUCOSE BLD-MCNC: 110 MG/DL (ref 74–99)
GLUCOSE BLD-MCNC: 262 MG/DL (ref 74–99)
GLUCOSE FLD-MCNC: 33 MG/DL
GLUCOSE SERPL-MCNC: 99 MG/DL (ref 74–99)
HBA1C MFR BLD: 7.2 % (ref 4–5.6)
HCT VFR BLD AUTO: 37.7 % (ref 34–48)
HGB BLD-MCNC: 11.1 G/DL (ref 11.5–15.5)
IMM GRANULOCYTES # BLD AUTO: 0.04 K/UL (ref 0–0.58)
IMM GRANULOCYTES NFR BLD: 0 % (ref 0–5)
INR PPP: 1.2
LDH FLD L TO P-CCNC: 1573 U/L
LYMPHOCYTES NFR BLD: 1.99 K/UL (ref 1.5–4)
LYMPHOCYTES RELATIVE PERCENT: 22 % (ref 20–42)
MCH RBC QN AUTO: 31.9 PG (ref 26–35)
MCHC RBC AUTO-ENTMCNC: 29.4 G/DL (ref 32–34.5)
MCV RBC AUTO: 108.3 FL (ref 80–99.9)
MONOCYTES NFR BLD: 0.89 K/UL (ref 0.1–0.95)
MONOCYTES NFR BLD: 10 % (ref 2–12)
MONOCYTES NFR FLD: 12 %
NEUTROPHILS NFR BLD: 65 % (ref 43–80)
NEUTROPHILS NFR FLD: 88 %
NEUTS SEG NFR BLD: 6.02 K/UL (ref 1.8–7.3)
PLATELET # BLD AUTO: 365 K/UL (ref 130–450)
PMV BLD AUTO: 9.7 FL (ref 7–12)
POTASSIUM SERPL-SCNC: 5.4 MMOL/L (ref 3.5–5)
PROT FLD-MCNC: 4.2 G/DL
PROTHROMBIN TIME: 12.6 SEC (ref 9.3–12.4)
RBC # BLD AUTO: 3.48 M/UL (ref 3.5–5.5)
RBC # FLD: 4000 CELLS/UL
SODIUM SERPL-SCNC: 135 MMOL/L (ref 132–146)
SPECIMEN TYPE: NORMAL
WBC # FLD: 6794 CELLS/UL
WBC OTHER # BLD: 9.3 K/UL (ref 4.5–11.5)

## 2025-01-23 PROCEDURE — 2500000003 HC RX 250 WO HCPCS: Performed by: INTERNAL MEDICINE

## 2025-01-23 PROCEDURE — 88112 CYTOPATH CELL ENHANCE TECH: CPT

## 2025-01-23 PROCEDURE — 6360000002 HC RX W HCPCS: Performed by: EMERGENCY MEDICINE

## 2025-01-23 PROCEDURE — 88305 TISSUE EXAM BY PATHOLOGIST: CPT

## 2025-01-23 PROCEDURE — 6370000000 HC RX 637 (ALT 250 FOR IP): Performed by: INTERNAL MEDICINE

## 2025-01-23 PROCEDURE — 2709999900 US THORACENTESIS

## 2025-01-23 PROCEDURE — 83986 ASSAY PH BODY FLUID NOS: CPT

## 2025-01-23 PROCEDURE — 99239 HOSP IP/OBS DSCHRG MGMT >30: CPT | Performed by: INTERNAL MEDICINE

## 2025-01-23 PROCEDURE — 89051 BODY FLUID CELL COUNT: CPT

## 2025-01-23 PROCEDURE — 97161 PT EVAL LOW COMPLEX 20 MIN: CPT

## 2025-01-23 PROCEDURE — 84157 ASSAY OF PROTEIN OTHER: CPT

## 2025-01-23 PROCEDURE — 82465 ASSAY BLD/SERUM CHOLESTEROL: CPT

## 2025-01-23 PROCEDURE — 85610 PROTHROMBIN TIME: CPT

## 2025-01-23 PROCEDURE — 6360000002 HC RX W HCPCS: Performed by: PHYSICIAN ASSISTANT

## 2025-01-23 PROCEDURE — 82962 GLUCOSE BLOOD TEST: CPT

## 2025-01-23 PROCEDURE — 83615 LACTATE (LD) (LDH) ENZYME: CPT

## 2025-01-23 PROCEDURE — 36415 COLL VENOUS BLD VENIPUNCTURE: CPT

## 2025-01-23 PROCEDURE — 80048 BASIC METABOLIC PNL TOTAL CA: CPT

## 2025-01-23 PROCEDURE — 0W9B3ZZ DRAINAGE OF LEFT PLEURAL CAVITY, PERCUTANEOUS APPROACH: ICD-10-PCS | Performed by: INTERNAL MEDICINE

## 2025-01-23 PROCEDURE — 85025 COMPLETE CBC W/AUTO DIFF WBC: CPT

## 2025-01-23 PROCEDURE — 82945 GLUCOSE OTHER FLUID: CPT

## 2025-01-23 PROCEDURE — 2500000003 HC RX 250 WO HCPCS: Performed by: EMERGENCY MEDICINE

## 2025-01-23 PROCEDURE — 71046 X-RAY EXAM CHEST 2 VIEWS: CPT

## 2025-01-23 PROCEDURE — 87070 CULTURE OTHR SPECIMN AEROBIC: CPT

## 2025-01-23 PROCEDURE — 87205 SMEAR GRAM STAIN: CPT

## 2025-01-23 PROCEDURE — 83036 HEMOGLOBIN GLYCOSYLATED A1C: CPT

## 2025-01-23 RX ORDER — GABAPENTIN 300 MG/1
300 CAPSULE ORAL 2 TIMES DAILY
Status: DISCONTINUED | OUTPATIENT
Start: 2025-01-23 | End: 2025-01-23 | Stop reason: HOSPADM

## 2025-01-23 RX ORDER — LIDOCAINE HYDROCHLORIDE 10 MG/ML
INJECTION, SOLUTION INFILTRATION; PERINEURAL PRN
Status: COMPLETED | OUTPATIENT
Start: 2025-01-23 | End: 2025-01-23

## 2025-01-23 RX ADMIN — SODIUM CHLORIDE, PRESERVATIVE FREE 10 ML: 5 INJECTION INTRAVENOUS at 00:43

## 2025-01-23 RX ADMIN — FOLIC ACID 1 MG: 1 TABLET ORAL at 08:20

## 2025-01-23 RX ADMIN — CEFAZOLIN 2000 MG: 2 INJECTION, POWDER, FOR SOLUTION INTRAMUSCULAR; INTRAVENOUS at 15:16

## 2025-01-23 RX ADMIN — APIXABAN 5 MG: 5 TABLET, FILM COATED ORAL at 08:20

## 2025-01-23 RX ADMIN — CEFAZOLIN 2000 MG: 2 INJECTION, POWDER, FOR SOLUTION INTRAMUSCULAR; INTRAVENOUS at 00:42

## 2025-01-23 RX ADMIN — SODIUM CHLORIDE, PRESERVATIVE FREE 10 ML: 5 INJECTION INTRAVENOUS at 08:22

## 2025-01-23 RX ADMIN — LIDOCAINE HYDROCHLORIDE 5 ML: 10 INJECTION, SOLUTION INFILTRATION; PERINEURAL at 10:14

## 2025-01-23 RX ADMIN — PREDNISONE 10 MG: 5 TABLET ORAL at 08:20

## 2025-01-23 RX ADMIN — CEFAZOLIN 2000 MG: 2 INJECTION, POWDER, FOR SOLUTION INTRAMUSCULAR; INTRAVENOUS at 08:20

## 2025-01-23 NOTE — CONSULTS
Ernesto Hawkins M.D.,[facility]  Ron Mcelroy D.O., ROXANN., [facility]  Raj Keen M.D.  Ana Cristina Harmon M.D.   Jesus Manuel Blackwood D.O.  Joao Hernandez M.D.       Patient:  Cammy Lopez 69 y.o. female MRN: [id number]           PULMONARY CONSULTATION    Reason for Consultation: known pt, recurrent effusion  Referring Physician: Mariam Guerrier MD    Communication with the referring physician will be sent via the electronic medical record.    Chief Complaint: rapid heart rate    CODE STATUS: DNR-CCA    SUBJECTIVE:  HPI:  Cammy Lopez is a 69 y.o. female we were asked to evaluate for a recurrent pleural effusion.    Cammy is known to our practice and follows with Dr. Raj Keen. She has Stage IV malignant neoplasm of lower lobe of left lung (adenocarcinoma) diagnosed by bone biopsy prior EBUS bronchoscopy 2023 Dr Keen with atypical cells. With mets to bone and brain, status post stereotactic radiosurgery. She was hospitalized in December and had a left thoracentesis done with 1.1 L of exudate fluid remove, culture was positive for MSSA. She was treated by Infectious Disease.     Cammy came to the hospital reporting a rapid heart rate. She was found to be in AF RVR. A CXR was done showing a left pleural effusion. Orders were placed by primary service for IR to do a thoracentesis which was completed today. Only 370 ml was removed. No studies were ordered, but luckily IR sent the patient back with a specimen of fluid for which we will send off for testing especially considering the last time pleural fluid was removed it was positive for MSSA. Post procedure CXR is negative for pneumothorax however does not show much improvement in the effusion which leads me to believe this may be more loculated.     Cammy was seen today just returning from the restroom. Visitor is present. She is on room air and appears to be doing well.       Past Medical History:   Diagnosis Date    Hypertension      Lacrimal duct stenosis, bilateral 12/2023    S/p endoscopic DCR at [facility].    Malignant neoplasm of lower lobe of left lung (HCC) 02/14/2023    Adenocarcinoma     Secondary cancer of bone (HCC) 03/31/2023    Right sacral mass bone biopsy: metastatic adenocarcinoma.     Secondary cancer of brain (HCC) 03/22/2023    Secondary cancer of brain (HCC) 03/07/2024    2 new CNS metastases on MRI Brain.    Status post stereotactic radiosurgery 04/18/2023    SRS to right lateral frontal met, 2400 cGy/ 1 fraction.    Status post stereotactic radiosurgery 04/20/2023    SRS to right high posterior frontal met, 2400 cGy/ 1 fraction.    Status post stereotactic radiosurgery 04/26/2023    SRS to right sacrum bone met, 2400 cGy/ 4 fractions completed 05/01/2023.    Status post stereotactic radiosurgery 03/29/2024    SRS to left frontal met, 2400 cGy/ 1 fraction.    Status post stereotactic radiosurgery 03/29/2024    SRS to the left parasaggital parietal metastasis, 2400 cGy/ 1 fraction.    Thyroid disease        Past Surgical History:   Procedure Laterality Date    BRONCHOSCOPY N/A 3/7/2023    BRONCHOSCOPY ENDOBRONCHIAL ULTRASOUND, TBB, FNA,TBL, CYTOLOGY performed by Raj Keen MD at [facility] [place]COPY    BRONCHOSCOPY N/A 3/7/2023    BRONCHOSCOPY BRUSHINGS performed by Raj Keen MD at [facility]OSCOPY    BRONCHOSCOPY  3/7/2023    BRONCHOSCOPY W/EBUS FNA performed by Raj Keen MD at [facility]OSCOPY    BRONCHOSCOPY  3/7/2023    BRONCHOSCOPY DIAGNOSTIC OR CELL WASH ONLY performed by Raj Keen MD at [facility] ENDOSCOPY    BRONCHOSCOPY  3/7/2023    BRONCHOSCOPY BIOPSY BRONCHUS performed by Raj Keen MD at [facility] ENDOSCOPY    CT BIOPSY DEEP BONE PERCUTANEOUS  3/31/2023    CT BIOPSY PERCUTANEOUS DEEP BONE 3/31/2023 Celestino Workman MD [facility] [place]    PORT SURGERY Right 7/12/2023    PORT INSERTION performed by Emili Alberts MD at [facility] OR       Family History   Problem Relation Age of Onset

## 2025-01-23 NOTE — PROGRESS NOTES
Physical Therapy  Facility/Department: [address] MED SURG/TELE  Physical Therapy Initial Assessment    Name: Cammy Lopez  : 1955  MRN: [id number]  Date of Service: 2025    Attending Provider:  Mariam Guerrier MD    Evaluating PT:  Trino Marcelo Jr., P.T.    Room #:  [address] [place] [address]-  Diagnosis:  Atrial fibrillation with RVR (HCC) [I48.91]  Pertinent PMHx/PSHx:  malignant neoplasm lower lobe L lung with METs to the brain  Procedure/Surgery:  25 L side thoracentesis 370mL removed  Precautions:  falls    SUBJECTIVE:    Pt lives alone in a 1 story home with no stairs to enter.  Pt ambulated with a ww since the beginning of January.    OBJECTIVE:   Initial Evaluation  Date: 25 Treatment Short Term/ Long Term   Goals   Was pt agreeable to Eval/treatment? yes     Does pt have pain? No c/o pain     Bed Mobility  Rolling: Independent  Supine to sit: Independent  Sit to supine: Independent  Scooting: Independent  Independent   Transfers Sit to stand: Independent  Stand to sit: Independent  Stand pivot: Independent with ww  Independent    Ambulation   200 feet with ww supervision  350 feet with ww Independent    Stair negotiation: ascended and descended NA  NA   AM-PAC 6 Clicks        BLE ROM is WFL.   BLE strength is grossly 4/5 to 4+/5.   Sensation:  Pt reports chronic numbness and tingling to B feet  Balance: sitting is Independent and standing with ww is Independent   Endurance: fair+    ASSESSMENT:    Conditions Requiring Skilled Therapeutic Intervention:    [x]Decreased strength     []Decreased ROM  [x]Decreased functional mobility  [x]Decreased balance   [x]Decreased endurance   []Decreased posture  []Decreased sensation  []Decreased coordination   []Decreased vision  []Decreased safety awareness   []Increased pain       Comments:  Pt was in bed and was agreeable to PT.  She walked with ww in the sandhu and had no SOB or LOB and was able to maintain a conversation during her walk.  Pt

## 2025-01-23 NOTE — PROGRESS NOTES
[facility] Hospitalist Progress Note    Admitting Date and Time: 1/22/2025  9:45 AM  Admit Dx: Atrial fibrillation with RVR (HCC) [I48.91]    Subjective:  Patient is being followed for Atrial fibrillation with RVR (HCC) [I48.91]   Pt seen and examined this morning  No acute events overnight  Complains of being weak and tired    ROS: denies fever, chills, cp, sob, n/v, HA unless stated above.      gabapentin  300 mg Oral BID    ceFAZolin  2,000 mg IntraVENous Q8H    primidone  100 mg Oral Nightly    predniSONE  10 mg Oral Daily    folic acid  1 mg Oral Daily    fentaNYL  1 patch TransDERmal Q72H    apixaban  5 mg Oral BID    sodium chloride flush  5-40 mL IntraVENous 2 times per day    insulin lispro  0-8 Units SubCUTAneous 4x Daily AC & HS     prochlorperazine, 5 mg, Q6H PRN  oxyCODONE-acetaminophen, 1 tablet, Q6H PRN  sodium chloride flush, 5-40 mL, PRN  sodium chloride, , PRN  potassium chloride, 40 mEq, PRN   Or  potassium alternative oral replacement, 40 mEq, PRN   Or  potassium chloride, 10 mEq, PRN  magnesium sulfate, 2,000 mg, PRN  ondansetron, 4 mg, Q8H PRN   Or  ondansetron, 4 mg, Q6H PRN  polyethylene glycol, 17 g, Daily PRN  acetaminophen, 650 mg, Q6H PRN   Or  acetaminophen, 650 mg, Q6H PRN  glucose, 4 tablet, PRN  dextrose bolus, 125 mL, PRN   Or  dextrose bolus, 250 mL, PRN  glucagon (rDNA), 1 mg, PRN  dextrose, , Continuous PRN         Objective:    /68   Pulse 82   Temp 97.9 °F (36.6 °C)   Resp 18   Ht 1.651 m (5' 5\")   Wt 71.6 kg (157 lb 14.4 oz)   SpO2 95%   BMI 26.28 kg/m²     General Appearance: alert and awake, no acute distress  Skin: warm and dry  Head: normocephalic and atraumatic  Eyes: pupils equal, round, and reactive to light, extraocular eye movements intact, conjunctivae normal  Neck: neck supple and non tender without mass   Pulmonary/Chest: Diminished bilaterally, crackles on the left  Cardiovascular: normal rate, normal S1 and S2 and no carotid bruits  Abdomen:

## 2025-01-23 NOTE — OR NURSING
Patient brought into room, discussed procedure. Kortney bush PA-C answered all questions and concerns related to the procedure. Treatment consent signed, and DNR addendum form signed. Patient positioned and sterile prepped for the procedure. 1% Lidocaine administered to posterior left chest by Kortney bush PA-C.  A total of 370 mL clear yellow pleural fluid was taken. Specimen obtained and sent to floor should fluid studies be ordered. Patient tolerated procedure well. Site cleaned and dressed with a bandage. Vitals monitored and remained stable throughout. A stat chest xray performed and read by Dr. Tafoya. Patient transported out of department with all belongings and without distress.

## 2025-01-23 NOTE — ACP (ADVANCE CARE PLANNING)
Advance Care Planning   Healthcare Decision Maker:    Primary Decision Maker: Hans Lopezll - Brother/Sister - [phone]    Click here to complete Healthcare Decision Makers including selection of the Healthcare Decision Maker Relationship (ie \"Primary\").

## 2025-01-23 NOTE — PLAN OF CARE
Problem: Discharge Planning  Goal: Discharge to home or other facility with appropriate resources  1/23/2025 0207 by Pat Carcamo RN  Outcome: Progressing  Flowsheets (Taken 1/22/2025 2113)  Discharge to home or other facility with appropriate resources: Identify barriers to discharge with patient and caregiver  1/22/2025 1725 by Guzman Ashley RN  Outcome: Progressing     Problem: Safety - Adult  Goal: Free from fall injury  1/23/2025 0207 by Pat Carcamo RN  Outcome: Progressing  Flowsheets (Taken 1/22/2025 2113)  Free From Fall Injury: Instruct family/caregiver on patient safety  1/22/2025 1725 by Guzman Ashley RN  Outcome: Progressing     Problem: ABCDS Injury Assessment  Goal: Absence of physical injury  1/23/2025 0207 by Pat Carcamo RN  Outcome: Progressing  Flowsheets (Taken 1/22/2025 2113)  Absence of Physical Injury: Implement safety measures based on patient assessment  1/22/2025 1725 by Guzman Ashley RN  Outcome: Progressing

## 2025-01-23 NOTE — CARE COORDINATION
Transition of Care-met with patient bedside, introduced myself and CM role in care coordination. Patient lives alone in a one story home, no steps. She uses a cane to ambulate with as needed. Latesha, last discharged from [facility] 12/31. Returned d/t a-fib. She is active with [facility], [facility] in place.    Winter SULLIVAN, RN  [facility]

## 2025-01-23 NOTE — PLAN OF CARE
Problem: Discharge Planning  Goal: Discharge to home or other facility with appropriate resources  1/23/2025 0944 by Guzman Ashley RN  Outcome: Progressing  1/23/2025 0207 by Pat Carcamo RN  Outcome: Progressing  Flowsheets (Taken 1/22/2025 2113)  Discharge to home or other facility with appropriate resources: Identify barriers to discharge with patient and caregiver     Problem: Safety - Adult  Goal: Free from fall injury  1/23/2025 0944 by Guzman Ashley RN  Outcome: Progressing  1/23/2025 0207 by Pat aCrcamo RN  Outcome: Progressing  Flowsheets (Taken 1/22/2025 2113)  Free From Fall Injury: Instruct family/caregiver on patient safety     Problem: ABCDS Injury Assessment  Goal: Absence of physical injury  1/23/2025 0944 by Guzman Ashley RN  Outcome: Progressing  1/23/2025 0207 by Pat Carcamo RN  Outcome: Progressing  Flowsheets (Taken 1/22/2025 2113)  Absence of Physical Injury: Implement safety measures based on patient assessment

## 2025-01-23 NOTE — PROCEDURES
Procedure: Ultrasound Guided Left Thoracentesis    Diagnosis: Pleural Effusion    Findings: Pleural effusion, successful catheter placement with cloudy yellow pleural fluid return    Specimen: Approximately 10cc obtained and sent with patient, incase orders are added for fluid analysis. Total amount of fluid removed 370 ml. Multiple septations identified limiting volume removal.     Anesthesia: Local infiltration of 8 cc 1% Lidocaine without epi    EBL: Minimal.    Complication: None immediately post procedure.    Plan: Post thoracentesis chest xray. Return to floor/room following thoracentesis.    Comments:    See radiology dictation in PACs for image review and additional procedural information.

## 2025-01-23 NOTE — PROGRESS NOTES
[facility]  [facility]  Progress Note    SUBJECTIVE:  No chief complaint on file.    Patient is tolerating medications. No reported adverse drug reactions.  No nausea, vomiting, diarrhea.  Patient is resting in bed, visitor at bedside  Patient reports she is doing well, states her breathing has slightly improved status post her thoracentesis today  She is hoping to go home today  No fevers overnight    Review of systems:  As stated above in the chief complaint, otherwise negative.    Medications:  Scheduled Meds:   gabapentin  300 mg Oral BID    ceFAZolin  2,000 mg IntraVENous Q8H    primidone  100 mg Oral Nightly    predniSONE  10 mg Oral Daily    folic acid  1 mg Oral Daily    fentaNYL  1 patch TransDERmal Q72H    apixaban  5 mg Oral BID    sodium chloride flush  5-40 mL IntraVENous 2 times per day     Continuous Infusions:   sodium chloride      dextrose       PRN Meds:prochlorperazine, oxyCODONE-acetaminophen, sodium chloride flush, sodium chloride, potassium chloride **OR** potassium alternative oral replacement **OR** potassium chloride, magnesium sulfate, ondansetron **OR** ondansetron, polyethylene glycol, acetaminophen **OR** acetaminophen, glucose, dextrose bolus **OR** dextrose bolus, glucagon (rDNA), dextrose    OBJECTIVE:  /79   Pulse 72   Temp 97.9 °F (36.6 °C) (Oral)   Resp 18   Ht 1.651 m (5' 5\")   Wt 71.6 kg (157 lb 14.4 oz)   SpO2 94%   BMI 26.28 kg/m²   Temp  Av.8 °F (36.6 °C)  Min: 97.7 °F (36.5 °C)  Max: 97.9 °F (36.6 °C)  Constitutional: The patient is awake, alert, and oriented.  Sitting up in bed, in no apparent distress.  Visitor at bedside.  Skin: Warm and dry. No rashes were noted.   HEENT: Round and reactive pupils.  Moist mucous membranes.  No ulcerations or thrush.  Neck: Supple to movements.   Chest: No use of accessory muscles to breathe. Symmetrical expansion.  No wheezing, crackles or rhonchi.  Cardiovascular: S1 and S2 are rhythmic

## 2025-01-24 ENCOUNTER — TELEPHONE (OUTPATIENT)
Dept: CARDIOTHORACIC SURGERY | Age: 70
End: 2025-01-24

## 2025-01-24 PROCEDURE — 99239 HOSP IP/OBS DSCHRG MGMT >30: CPT | Performed by: INTERNAL MEDICINE

## 2025-01-24 NOTE — DISCHARGE SUMMARY
[facility] Hospitalist Physician Discharge Summary       [facility]  [address]  [place] [address]  [phone]          Activity level: As tolerated     Dispo: Home      Condition on discharge: Stable     Patient ID:  Cammy Lopez  [id number]  69 y.o.  1955    Admit date: 1/22/2025    Discharge date and time:  1/24/2025  7:14 AM    Admission Diagnoses: Principal Problem:    Atrial fibrillation with RVR (HCC)  Resolved Problems:    * No resolved hospital problems. *      Discharge Diagnoses: Principal Problem:    Atrial fibrillation with RVR (HCC)  Resolved Problems:    * No resolved hospital problems. *      Consults:  IP CONSULT TO INTERNAL MEDICINE  IP CONSULT TO PULMONOLOGY    Hospital Course:   Patient Cammy Lopez is a 69 y.o. presented with Atrial fibrillation with RVR (HCC) [I48.91]    Patient is a 79-year-old female who was admitted due to generalized weakness, likely due to A-fib with RVR and metastatic lung cancer.  Symptoms resolved with IV fluids.  No acute infection.  Converted to sinus rhythm after IV fluids.  CXR showed enlarging left moderate pleural effusion and underwent left thoracentesis by IR with removal of 370 cc.  Patient has history of infected pleural effusion with MSSA, she is still on cefazolin per ID until 1/28.  She was seen by pulmonology with no acute intervention at this time.  Plan for referral to cardiothoracic surgery as outpatient for VATS evaluation.  Patient is cleared for discharge and will be going home in stable condition.    Discharge Exam:    General Appearance: alert and oriented to person, place and time and in no acute distress  Skin: warm and dry  Head: normocephalic and atraumatic  Eyes: pupils equal, round, and reactive to light, extraocular eye movements intact, conjunctivae normal  Neck: neck supple and non tender without mass   Pulmonary/Chest: clear to auscultation bilaterally- no wheezes, rales or

## 2025-01-25 LAB
EKG ATRIAL RATE: 182 BPM
EKG ATRIAL RATE: 83 BPM
EKG P AXIS: 35 DEGREES
EKG P-R INTERVAL: 116 MS
EKG Q-T INTERVAL: 246 MS
EKG Q-T INTERVAL: 340 MS
EKG QRS DURATION: 66 MS
EKG QRS DURATION: 66 MS
EKG QTC CALCULATION (BAZETT): 399 MS
EKG QTC CALCULATION (BAZETT): 413 MS
EKG R AXIS: 31 DEGREES
EKG R AXIS: 39 DEGREES
EKG T AXIS: 60 DEGREES
EKG T AXIS: 62 DEGREES
EKG VENTRICULAR RATE: 170 BPM
EKG VENTRICULAR RATE: 83 BPM

## 2025-01-25 PROCEDURE — 93010 ELECTROCARDIOGRAM REPORT: CPT | Performed by: INTERNAL MEDICINE

## 2025-01-26 LAB
MICROORGANISM SPEC CULT: NO GROWTH
MICROORGANISM/AGENT SPEC: NORMAL
SERVICE CMNT-IMP: NORMAL
SPECIMEN DESCRIPTION: NORMAL

## 2025-01-27 LAB — NON-GYN CYTOLOGY REPORT: NORMAL

## 2025-01-27 NOTE — PROGRESS NOTES
Physician Progress Note      PATIENT:               YASIR SCOTT  [facility] #:                  [id number]  :                       1955  ADMIT DATE:       2025 9:45 AM  DISCH DATE:        2025 4:29 PM  RESPONDING  PROVIDER #:        Mariam Guerrier MD          QUERY TEXT:    Patient admitted with PAF. Documentation in H&P and IM progress note dated    reflect suspected NSTEMI. No documentation of NSTEMI is mentioned in the   DS.  If possible, please document in the progress notes and discharge summary   if NSTEMI was:    The medical record reflects the following:  Risk Factors: HTN, PAF, cancer, acute on chronic illness  Clinical Indicators: Beginning @ admission, Troponins, 101 96. EKG  @   1001, Atrial fibrillation with rapid ventricular response with premature   ventricular or aberrantly conducted complexes Low voltage QRS Nonspecific T   wave abnormality  Abnormal ECG When compared with ECG of 26-DEC-2024 12:41, Atrial fibrillation   has replaced Sinus rhythm Vent. rate has increased BY  83 BPM Nonspecific T   wave abnormality now evident in Lateral leads. EKG  @ 1059, Normal sinus   rhythm  Normal ECG When compared with ECG of 2025 10:01, Sinus rhythm has   replaced Atrial fibrillation Vent. rate has decreased BY  87 BPM Nonspecific T   wave abnormality no longer evident in Lateral leads.  Treatment: Troponins, EKG,  Options provided:  -- NSTEMI confirmed after study  -- NSTEMI ruled out after study  -- Other - I will add my own diagnosis  -- Disagree - Not applicable / Not valid  -- Disagree - Clinically unable to determine / Unknown  -- Refer to Clinical Documentation Reviewer    PROVIDER RESPONSE TEXT:    NSTEMI ruled out after study.    Query created by: Mary Lou Huggins on 2025 11:06 AM      QUERY TEXT:    Patient admitted with paroxysmal atrial fibrillation and is maintained on   Eliquis. If possible, please document in progress notes and discharge summary   if

## 2025-01-28 DIAGNOSIS — C34.32 MALIGNANT NEOPLASM OF LOWER LOBE OF LEFT LUNG (HCC): Primary | ICD-10-CM

## 2025-01-28 LAB
ALBUMIN SERPL-MCNC: 3.3 G/DL (ref 3.5–5.2)
ALP SERPL-CCNC: 65 U/L (ref 35–104)
ALT SERPL-CCNC: <5 U/L (ref 0–32)
ANION GAP SERPL CALCULATED.3IONS-SCNC: 12 MMOL/L (ref 7–16)
AST SERPL-CCNC: 16 U/L (ref 0–31)
BASOPHILS # BLD: 0.06 K/UL (ref 0–0.2)
BASOPHILS NFR BLD: 1 % (ref 0–2)
BILIRUB SERPL-MCNC: <0.2 MG/DL (ref 0–1.2)
BUN SERPL-MCNC: 25 MG/DL (ref 6–23)
CALCIUM SERPL-MCNC: 9 MG/DL (ref 8.6–10.2)
CHLORIDE SERPL-SCNC: 101 MMOL/L (ref 98–107)
CO2 SERPL-SCNC: 26 MMOL/L (ref 22–29)
CREAT SERPL-MCNC: 0.9 MG/DL (ref 0.5–1)
CRP SERPL HS-MCNC: 29 MG/L (ref 0–5)
EOSINOPHIL # BLD: 0.06 K/UL (ref 0.05–0.5)
EOSINOPHILS RELATIVE PERCENT: 1 % (ref 0–6)
ERYTHROCYTE [DISTWIDTH] IN BLOOD BY AUTOMATED COUNT: 14.7 % (ref 11.5–15)
GFR, ESTIMATED: 72 ML/MIN/1.73M2
GLUCOSE SERPL-MCNC: 227 MG/DL (ref 74–99)
HCT VFR BLD AUTO: 34.3 % (ref 34–48)
HGB BLD-MCNC: 10.5 G/DL (ref 11.5–15.5)
IMM GRANULOCYTES # BLD AUTO: 0.03 K/UL (ref 0–0.58)
IMM GRANULOCYTES NFR BLD: 0 % (ref 0–5)
LYMPHOCYTES NFR BLD: 1.29 K/UL (ref 1.5–4)
LYMPHOCYTES RELATIVE PERCENT: 17 % (ref 20–42)
MCH RBC QN AUTO: 31.9 PG (ref 26–35)
MCHC RBC AUTO-ENTMCNC: 30.6 G/DL (ref 32–34.5)
MCV RBC AUTO: 104.3 FL (ref 80–99.9)
MONOCYTES NFR BLD: 0.53 K/UL (ref 0.1–0.95)
MONOCYTES NFR BLD: 7 % (ref 2–12)
NEUTROPHILS NFR BLD: 75 % (ref 43–80)
NEUTS SEG NFR BLD: 5.86 K/UL (ref 1.8–7.3)
PLATELET # BLD AUTO: 371 K/UL (ref 130–450)
PMV BLD AUTO: 10 FL (ref 7–12)
POTASSIUM SERPL-SCNC: 5.1 MMOL/L (ref 3.5–5)
PROT SERPL-MCNC: 5.9 G/DL (ref 6.4–8.3)
RBC # BLD AUTO: 3.29 M/UL (ref 3.5–5.5)
SODIUM SERPL-SCNC: 139 MMOL/L (ref 132–146)
WBC OTHER # BLD: 7.8 K/UL (ref 4.5–11.5)

## 2025-01-28 NOTE — PROGRESS NOTES
Cardiothoracic Surgery       Subjective     Patient ID: Cammy Lopez     CC: recurrent pleural effusion/empyema      HPI:  Cammy Lopez is a 69 y.o. female with pertinent past medical history of HTN, Afib on eliquis, Lung CA with mets to bone and brain on chemo and radiation and PE who presents to office to discuss surgical intervention regarding recurrent pleural effusion. Briefly, in September 2024 she had complaints of increased shortness of breath and was found to have a left pleural effusion which was drained by IR on 10/8/2024 for 850mL. She presented to the ER on 12/26/2024 with complaints of SOB and moist cough. Moderate left pleural effusion was noted on CTA therefore she underwent a second thoracentesis on 12/27/2024 for 1100mL of cloudy yellow effusion that was sent for culture. Culture returned positive for MSSA and she was started on IV antibiotics which was to continue for 4 weeks. She presented back to the ER 1/22/2025 with complaints of rapid heart rate and was found to be in Afib RVR. CXR showed again a moderate left pleural effusion therefore for the third time she underwent IR drainage on 1/23/2025 for 370mL of cloudy yellow effusion. Antibiotics completed 1/28/2025. She currently reports ongoing WILKINS but denies any SOB at rest, palpitations, fever, chills or PND.     CT Chest 2/3/2025:   IMPRESSION:  1. Soft tissue density envelop in the left hilar region extending left  infrahilar appears increased in size from prior comparison with loss of  patency left bronchial tree up to 8.2 cm worrisome for local recurrence or  progression given increased involvement versus mixed atelectatic changes.  Bronchoscopy may be considered or PET-CT.  2. Moderate volume left pleural effusion remains.  3. Cardiomegaly with trace pericardial effusion.  4. Thyroid is heterogeneous in attenuation asymmetric

## 2025-01-29 LAB
ERYTHROCYTE [SEDIMENTATION RATE] IN BLOOD BY WESTERGREN METHOD: 66 MM/HR (ref 0–20)
REPORT STATUS: NORMAL

## 2025-01-31 LAB
ALBUMIN SERPL-MCNC: 3.4 G/DL (ref 3.5–5.2)
ALP SERPL-CCNC: 60 U/L (ref 35–104)
ALT SERPL-CCNC: <5 U/L (ref 0–32)
ANION GAP SERPL CALCULATED.3IONS-SCNC: 10 MMOL/L (ref 7–16)
AST SERPL-CCNC: 14 U/L (ref 0–31)
BASOPHILS # BLD: 0.05 K/UL (ref 0–0.2)
BASOPHILS NFR BLD: 1 % (ref 0–2)
BILIRUB SERPL-MCNC: 0.2 MG/DL (ref 0–1.2)
BUN SERPL-MCNC: 23 MG/DL (ref 6–23)
CALCIUM SERPL-MCNC: 8.4 MG/DL (ref 8.6–10.2)
CHLORIDE SERPL-SCNC: 102 MMOL/L (ref 98–107)
CO2 SERPL-SCNC: 26 MMOL/L (ref 22–29)
CREAT SERPL-MCNC: 1 MG/DL (ref 0.5–1)
EOSINOPHIL # BLD: 0.04 K/UL (ref 0.05–0.5)
EOSINOPHILS RELATIVE PERCENT: 1 % (ref 0–6)
ERYTHROCYTE [DISTWIDTH] IN BLOOD BY AUTOMATED COUNT: 14.6 % (ref 11.5–15)
GFR, ESTIMATED: 66 ML/MIN/1.73M2
GLUCOSE SERPL-MCNC: 230 MG/DL (ref 74–99)
HCT VFR BLD AUTO: 34.4 % (ref 34–48)
HGB BLD-MCNC: 10.7 G/DL (ref 11.5–15.5)
IMM GRANULOCYTES # BLD AUTO: <0.03 K/UL (ref 0–0.58)
IMM GRANULOCYTES NFR BLD: 0 % (ref 0–5)
LYMPHOCYTES NFR BLD: 0.85 K/UL (ref 1.5–4)
LYMPHOCYTES RELATIVE PERCENT: 13 % (ref 20–42)
MCH RBC QN AUTO: 32.1 PG (ref 26–35)
MCHC RBC AUTO-ENTMCNC: 31.1 G/DL (ref 32–34.5)
MCV RBC AUTO: 103.3 FL (ref 80–99.9)
MONOCYTES NFR BLD: 0.41 K/UL (ref 0.1–0.95)
MONOCYTES NFR BLD: 6 % (ref 2–12)
NEUTROPHILS NFR BLD: 79 % (ref 43–80)
NEUTS SEG NFR BLD: 5.19 K/UL (ref 1.8–7.3)
PLATELET # BLD AUTO: 346 K/UL (ref 130–450)
PMV BLD AUTO: 10 FL (ref 7–12)
POTASSIUM SERPL-SCNC: 4.6 MMOL/L (ref 3.5–5)
PROT SERPL-MCNC: 6 G/DL (ref 6.4–8.3)
RBC # BLD AUTO: 3.33 M/UL (ref 3.5–5.5)
SODIUM SERPL-SCNC: 138 MMOL/L (ref 132–146)
WBC OTHER # BLD: 6.6 K/UL (ref 4.5–11.5)

## 2025-02-03 ENCOUNTER — HOSPITAL ENCOUNTER (OUTPATIENT)
Dept: CT IMAGING | Age: 70
Discharge: HOME | End: 2025-02-05
Attending: THORACIC SURGERY (CARDIOTHORACIC VASCULAR SURGERY)
Payer: COMMERCIAL

## 2025-02-03 ENCOUNTER — HOSPITAL ENCOUNTER (OUTPATIENT)
Age: 70
Discharge: HOME | End: 2025-02-05

## 2025-02-03 DIAGNOSIS — C34.32 MALIGNANT NEOPLASM OF LOWER LOBE OF LEFT LUNG (HCC): ICD-10-CM

## 2025-02-03 LAB
BASOPHILS # BLD: 0.05 K/UL (ref 0–0.2)
BASOPHILS NFR BLD: 1 % (ref 0–2)
EOSINOPHIL # BLD: 0.03 K/UL (ref 0.05–0.5)
EOSINOPHILS RELATIVE PERCENT: 1 % (ref 0–6)
ERYTHROCYTE [DISTWIDTH] IN BLOOD BY AUTOMATED COUNT: 14.6 % (ref 11.5–15)
HCT VFR BLD AUTO: 32.9 % (ref 34–48)
HGB BLD-MCNC: 10.1 G/DL (ref 11.5–15.5)
IMM GRANULOCYTES # BLD AUTO: <0.03 K/UL (ref 0–0.58)
IMM GRANULOCYTES NFR BLD: 0 % (ref 0–5)
LYMPHOCYTES NFR BLD: 0.94 K/UL (ref 1.5–4)
LYMPHOCYTES RELATIVE PERCENT: 16 % (ref 20–42)
MCH RBC QN AUTO: 32.3 PG (ref 26–35)
MCHC RBC AUTO-ENTMCNC: 30.7 G/DL (ref 32–34.5)
MCV RBC AUTO: 105.1 FL (ref 80–99.9)
MONOCYTES NFR BLD: 0.56 K/UL (ref 0.1–0.95)
MONOCYTES NFR BLD: 10 % (ref 2–12)
NEUTROPHILS NFR BLD: 73 % (ref 43–80)
NEUTS SEG NFR BLD: 4.26 K/UL (ref 1.8–7.3)
PLATELET # BLD AUTO: 329 K/UL (ref 130–450)
PMV BLD AUTO: 9.9 FL (ref 7–12)
RBC # BLD AUTO: 3.13 M/UL (ref 3.5–5.5)
WBC OTHER # BLD: 5.9 K/UL (ref 4.5–11.5)

## 2025-02-03 PROCEDURE — 80053 COMPREHEN METABOLIC PANEL: CPT

## 2025-02-03 PROCEDURE — 71250 CT THORAX DX C-: CPT

## 2025-02-03 PROCEDURE — 85025 COMPLETE CBC W/AUTO DIFF WBC: CPT

## 2025-02-03 PROCEDURE — 85652 RBC SED RATE AUTOMATED: CPT

## 2025-02-03 PROCEDURE — 86140 C-REACTIVE PROTEIN: CPT

## 2025-02-04 ENCOUNTER — INITIAL CONSULT (OUTPATIENT)
Dept: CARDIOTHORACIC SURGERY | Age: 70
End: 2025-02-04

## 2025-02-04 VITALS
SYSTOLIC BLOOD PRESSURE: 110 MMHG | HEART RATE: 81 BPM | DIASTOLIC BLOOD PRESSURE: 67 MMHG | BODY MASS INDEX: 25.46 KG/M2 | WEIGHT: 153 LBS

## 2025-02-04 DIAGNOSIS — J90 PLEURAL EFFUSION: Primary | ICD-10-CM

## 2025-02-04 LAB
ALBUMIN SERPL-MCNC: 3.4 G/DL (ref 3.5–5.2)
ALP SERPL-CCNC: 63 U/L (ref 35–104)
ALT SERPL-CCNC: <5 U/L (ref 0–32)
ANION GAP SERPL CALCULATED.3IONS-SCNC: 12 MMOL/L (ref 7–16)
AST SERPL-CCNC: 16 U/L (ref 0–31)
BILIRUB SERPL-MCNC: <0.2 MG/DL (ref 0–1.2)
BUN SERPL-MCNC: 21 MG/DL (ref 6–23)
CALCIUM SERPL-MCNC: 8.3 MG/DL (ref 8.6–10.2)
CHLORIDE SERPL-SCNC: 102 MMOL/L (ref 98–107)
CO2 SERPL-SCNC: 23 MMOL/L (ref 22–29)
CREAT SERPL-MCNC: 0.8 MG/DL (ref 0.5–1)
CRP SERPL HS-MCNC: 35 MG/L (ref 0–5)
ERYTHROCYTE [SEDIMENTATION RATE] IN BLOOD BY WESTERGREN METHOD: 27 MM/HR (ref 0–20)
GFR, ESTIMATED: 77 ML/MIN/1.73M2
GLUCOSE SERPL-MCNC: 269 MG/DL (ref 74–99)
POTASSIUM SERPL-SCNC: 5 MMOL/L (ref 3.5–5)
PROT SERPL-MCNC: 5.7 G/DL (ref 6.4–8.3)
SODIUM SERPL-SCNC: 137 MMOL/L (ref 132–146)

## 2025-02-04 RX ORDER — OXYMETAZOLINE HYDROCHLORIDE 0.05 G/100ML
2 SPRAY NASAL 2 TIMES DAILY
COMMUNITY

## 2025-02-05 ENCOUNTER — HOSPITAL ENCOUNTER (OUTPATIENT)
Dept: MRI IMAGING | Age: 70
Discharge: HOME | End: 2025-02-07
Payer: COMMERCIAL

## 2025-02-05 DIAGNOSIS — Z08 ENCOUNTER FOR FOLLOW-UP SURVEILLANCE OF BRAIN CANCER: ICD-10-CM

## 2025-02-05 DIAGNOSIS — Z85.841 ENCOUNTER FOR FOLLOW-UP SURVEILLANCE OF BRAIN CANCER: ICD-10-CM

## 2025-02-05 PROCEDURE — 6360000004 HC RX CONTRAST MEDICATION: Performed by: RADIOLOGY

## 2025-02-05 PROCEDURE — 70553 MRI BRAIN STEM W/O & W/DYE: CPT

## 2025-02-05 PROCEDURE — A9579 GAD-BASE MR CONTRAST NOS,1ML: HCPCS | Performed by: RADIOLOGY

## 2025-02-05 RX ADMIN — GADOTERIDOL 30 ML: 279.3 INJECTION, SOLUTION INTRAVENOUS at 13:05

## 2025-03-12 ENCOUNTER — HOSPITAL ENCOUNTER (OUTPATIENT)
Age: 70
Discharge: HOME | End: 2025-03-14
Payer: COMMERCIAL

## 2025-03-12 ENCOUNTER — HOSPITAL ENCOUNTER (OUTPATIENT)
Age: 70
End: 2025-03-12
Payer: COMMERCIAL

## 2025-03-12 ENCOUNTER — HOSPITAL ENCOUNTER (OUTPATIENT)
Dept: GENERAL RADIOLOGY | Age: 70
Discharge: HOME | End: 2025-03-14
Payer: COMMERCIAL

## 2025-03-12 PROCEDURE — 71045 X-RAY EXAM CHEST 1 VIEW: CPT

## 2025-03-18 ENCOUNTER — HOSPITAL ENCOUNTER (OUTPATIENT)
Dept: RADIATION ONCOLOGY | Age: 70
Discharge: HOME | End: 2025-03-18
Payer: COMMERCIAL

## 2025-03-18 VITALS
OXYGEN SATURATION: 98 % | HEART RATE: 74 BPM | SYSTOLIC BLOOD PRESSURE: 114 MMHG | TEMPERATURE: 97.6 F | WEIGHT: 152.2 LBS | BODY MASS INDEX: 25.33 KG/M2 | RESPIRATION RATE: 18 BRPM | DIASTOLIC BLOOD PRESSURE: 60 MMHG

## 2025-03-18 DIAGNOSIS — Z85.841 ENCOUNTER FOR FOLLOW-UP SURVEILLANCE OF BRAIN CANCER: Primary | ICD-10-CM

## 2025-03-18 DIAGNOSIS — C79.31 SECONDARY CANCER OF BRAIN (HCC): Primary | ICD-10-CM

## 2025-03-18 DIAGNOSIS — C34.32 MALIGNANT NEOPLASM OF LOWER LOBE OF LEFT LUNG (HCC): ICD-10-CM

## 2025-03-18 DIAGNOSIS — Z08 ENCOUNTER FOR FOLLOW-UP SURVEILLANCE OF BRAIN CANCER: Primary | ICD-10-CM

## 2025-03-18 DIAGNOSIS — Z92.3 STATUS POST STEREOTACTIC RADIOSURGERY: ICD-10-CM

## 2025-03-18 DIAGNOSIS — Z71.2 ENCOUNTER TO DISCUSS TEST RESULTS: ICD-10-CM

## 2025-03-18 PROCEDURE — 99213 OFFICE O/P EST LOW 20 MIN: CPT | Performed by: NURSE PRACTITIONER

## 2025-03-18 PROCEDURE — 99213 OFFICE O/P EST LOW 20 MIN: CPT

## 2025-03-18 RX ORDER — LEVOFLOXACIN 750 MG/1
TABLET, FILM COATED ORAL
COMMUNITY
Start: 2025-03-17

## 2025-03-18 NOTE — PROGRESS NOTES
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 CNS and bone metastases.     03/22/2023 MRI BRAIN:   Solitary 10 metastasis in the high posterior right frontal lobe.   Moderate chronic microvascular ischemic changes.     03/27/2023 PET/CT SCAN:  Metabolically active mass in the posterior left hilar region extending into the hilum.  Metabolically active skeletal metastasis in the right aspect of the sacrum.  Metabolically active right metastasis corresponding to the finding on recent brain MRI.     03/30/2023 MRI BRAIN (SRS planning):  High posterior right frontal lobe 10 mm metastasis with mild vasogenic edema.   Right lateral frontal 2-3 mm metastasis.      04/13/2023: Cycle # 1 Carbo/ Alimta/ Keytruda per Medical Oncology. The patient also started on Xgeva per Medical Oncology.     S/p stereotactic radiosurgery (SRS):  Right lateral frontal met, SRS 2400 cGy/1 fraction on 04/18/2023.  Right high posterior frontal met, SRS 2400 cGy/ 1 fraction on 04/20/2023.  Right sacrum bone met, SRS 2400 cGy/ 4 fraction completed 05/01/2023.      05/17/2023: Radiation Oncology post SRS visit. The patient presented tearful complained of increased fatigue, myalgias/ arthralgias along with left sided swelling: mild left wrist, mild to moderate left knee swelling, S/p fall on 05/03/2023- impact to left knee and left upper arm swelling S/p injection XGEVA on 05/11/2023.     Medical Oncology notes reviewed. The patient started on Mobic for knee pain/ swelling. X-rays to left hand + wrist along with left knee + ankle completed. No acute fractures. Soft tissue swelling left hand- mild. Suprapatellar soft tissue swelling- left knee. The patient 
prevention  6.  Falls risk precaution (Yellow sticker Level II) placed on patient chart   7 or   Higher High Risk 1.  Place patient in easily observable treatment room  2.  Patient attended at all times by family member or staff  3.  Provide assistance as indicated for ambulation activities  4.  Reorient confused/cognitively impaired patient  5.  Call-light/bell within patient's reach  6.  Chair/bed in low position, stretcher/bed with siderails up except when performing patient care activities  7.  Educate patient/family/caregiver on falls prevention  8.  Falls risk precaution (Yellow sticker Level III) placed on patient chart           MALNUTRITION RISK SCREENING ASSESSMENT    3/18/2025   Patient:  Cammy Lopez  Sex:  female    Instructions:  Assess the patient and enter the appropriate indicators that are present for nutrition risk identification. Total the numbers entered and assign a risk score. Follow the appropriate action for total score listed below.     Assessment   Date  3/18/2025     Have you lost weight without trying?      2- Unsure     Have you been eating poorly because of a decreased appetite?         1- Yes   3. Do you have a diagnosis of head and neck cancer OR will you be receiving neoadjuvant treatment for breast cancer?      0- No                                                                                    TOTAL 3          Score of 0-1: No action  Score 2 or greater:  For Non-Diabetic Patient: Recommend adding Ensure Complete 2 x daily and provide patient with Ensure wellness bag with coupons  For Diabetic Patient: Recommend adding Glucerna Shake 2 x daily and provide patient with Glucerna Wellness bag with coupons  Route to the dietitian via Rajesh Flores RN

## 2025-04-14 ENCOUNTER — TRANSCRIBE ORDERS (OUTPATIENT)
Dept: ADMINISTRATIVE | Age: 70
End: 2025-04-14

## 2025-04-14 DIAGNOSIS — C79.51 SECONDARY MALIGNANT NEOPLASM OF BONE AND BONE MARROW: Primary | ICD-10-CM

## 2025-04-14 DIAGNOSIS — C34.32 PRIMARY MALIGNANT NEOPLASM OF BRONCHUS OF LEFT LOWER LOBE (HCC): ICD-10-CM

## 2025-04-14 DIAGNOSIS — C79.52 SECONDARY MALIGNANT NEOPLASM OF BONE AND BONE MARROW: Primary | ICD-10-CM

## 2025-04-28 ENCOUNTER — HOSPITAL ENCOUNTER (OUTPATIENT)
Dept: MRI IMAGING | Age: 70
Discharge: HOME | End: 2025-04-30
Payer: COMMERCIAL

## 2025-04-28 DIAGNOSIS — Z85.841 ENCOUNTER FOR FOLLOW-UP SURVEILLANCE OF BRAIN CANCER: ICD-10-CM

## 2025-04-28 DIAGNOSIS — Z08 ENCOUNTER FOR FOLLOW-UP SURVEILLANCE OF BRAIN CANCER: ICD-10-CM

## 2025-04-28 PROCEDURE — A9579 GAD-BASE MR CONTRAST NOS,1ML: HCPCS | Performed by: RADIOLOGY

## 2025-04-28 PROCEDURE — 70553 MRI BRAIN STEM W/O & W/DYE: CPT

## 2025-04-28 PROCEDURE — 6360000004 HC RX CONTRAST MEDICATION: Performed by: RADIOLOGY

## 2025-04-28 RX ADMIN — GADOTERIDOL 28 ML: 279.3 INJECTION, SOLUTION INTRAVENOUS at 12:24

## 2025-05-06 ENCOUNTER — HOSPITAL ENCOUNTER (OUTPATIENT)
Dept: RADIATION ONCOLOGY | Age: 70
Discharge: HOME | End: 2025-05-06
Payer: COMMERCIAL

## 2025-05-06 VITALS
SYSTOLIC BLOOD PRESSURE: 133 MMHG | WEIGHT: 152.4 LBS | DIASTOLIC BLOOD PRESSURE: 80 MMHG | OXYGEN SATURATION: 100 % | BODY MASS INDEX: 25.36 KG/M2 | HEART RATE: 74 BPM | TEMPERATURE: 97.4 F | RESPIRATION RATE: 18 BRPM

## 2025-05-06 DIAGNOSIS — C79.31 SECONDARY CANCER OF BRAIN (HCC): ICD-10-CM

## 2025-05-06 DIAGNOSIS — C79.31 SECONDARY CANCER OF BRAIN (HCC): Primary | ICD-10-CM

## 2025-05-06 DIAGNOSIS — Z85.841 ENCOUNTER FOR FOLLOW-UP SURVEILLANCE OF BRAIN CANCER: Primary | ICD-10-CM

## 2025-05-06 DIAGNOSIS — Z08 ENCOUNTER FOR FOLLOW-UP SURVEILLANCE OF BRAIN CANCER: Primary | ICD-10-CM

## 2025-05-06 DIAGNOSIS — Z92.3 STATUS POST STEREOTACTIC RADIOSURGERY: ICD-10-CM

## 2025-05-06 DIAGNOSIS — Z71.2 ENCOUNTER TO DISCUSS TEST RESULTS: ICD-10-CM

## 2025-05-06 PROCEDURE — 99213 OFFICE O/P EST LOW 20 MIN: CPT | Performed by: NURSE PRACTITIONER

## 2025-05-06 PROCEDURE — 99213 OFFICE O/P EST LOW 20 MIN: CPT

## 2025-05-06 NOTE — PROGRESS NOTES
Cammy YOLANDA Lopez  5/6/2025  1:26 PM      Vitals:    05/06/25 1324   BP: 133/80   Pulse: 74   Resp: 18   Temp: 97.4 °F (36.3 °C)   SpO2: 100%    :    Wt Readings from Last 3 Encounters:   05/06/25 69.1 kg (152 lb 6.4 oz)   03/18/25 69 kg (152 lb 3.2 oz)   02/04/25 69.4 kg (153 lb)                Current Outpatient Medications:     levoFLOXacin (LEVAQUIN) 750 MG tablet, , Disp: , Rfl:     oxymetazoline (12 HOUR NASAL SPRAY) 0.05 % nasal spray, 2 sprays by Nasal route 2 times daily, Disp: , Rfl:     apixaban (ELIQUIS) 5 MG TABS tablet, Take 2 tablets by mouth 2 times daily for 4 days, THEN 1 tablet 2 times daily., Disp: 60 tablet, Rfl: 0    clobetasol (TEMOVATE) 0.05 % ointment, To hands, Disp: , Rfl:     FULPHILA 6 MG/0.6ML injection, , Disp: , Rfl:     potassium chloride (MICRO-K) 10 MEQ extended release capsule, Take 1 capsule by mouth daily, Disp: , Rfl:     Ramucirumab (CYRAMZA IV), Infuse intravenously 12/13/24, Disp: , Rfl:     sodium chloride 0.9 % SOLN 250 mL with fosaprepitant 150 MG SOLR 150 mg, Infuse 150 mg intravenously once 12/13/2024, Disp: , Rfl:     folic acid (FOLVITE) 1 MG tablet, Take 1 tablet by mouth daily, Disp: , Rfl:     furosemide (LASIX) 20 MG tablet, Take 1 tablet by mouth daily, Disp: , Rfl:     primidone (MYSOLINE) 50 MG tablet, Take 2 tablets by mouth nightly, Disp: , Rfl:     predniSONE (DELTASONE) 10 MG tablet, Take 1 tablet by mouth daily with food, Disp: , Rfl:     fentaNYL (DURAGESIC) 25 MCG/HR, APPLY 1 PATCH TOPICALL EVERY 72 HOURS, Disp: , Rfl:     gabapentin (NEURONTIN) 300 MG capsule, Take 1 capsule by mouth in the morning and at bedtime., Disp: , Rfl:     ondansetron (ZOFRAN-ODT) 8 MG TBDP disintegrating tablet, , Disp: , Rfl:     oxyCODONE-acetaminophen (PERCOCET)  MG per tablet, Take 1 tablet by mouth every 6 hours as needed., Disp: , Rfl:     prochlorperazine (COMPAZINE) 10 MG tablet, , Disp: , Rfl:     losartan (COZAAR) 50 MG tablet, TAKE 1 TABLET BY MOUTH EVERYDAY 
metastatic disease.  Moderate to severe T2 hyperintensities in the cerebral white matter, the extent of which has a somewhat increased as of 06/16/2023. Finding may be due to accelerated chronic microvascular ischemic changes related to radiation therapy to the brain.     03/07/2024 MRI BRAIN: interval development of two 4-5 mm and 4 mm metastases in the left frontal lobe and left parietal lobe. No mass effect or midline shift.     S/p stereotactic radiosurgery (SRS):  Left frontal met, SRS 2400 cGy/ 1 fraction on 03/29/2024.   Left parasaggital parietal met, SRS 2400 cGy/ 1 fraction on 03/29/2024.     05/28/2024 MRI BRAIN: revealed 3 mm metastasis in the left frontal lobe, slightly decreased in size. Stable 4 mm metastasis in the parasagittal left parietal lobe. No new metastasis is seen. Moderate chronic microvascular ischemia changes.     07/26/2024 MRI BRAIN: Stable to slightly decreased size of left frontal and left parietal lobe metastases potential continued treatment response. No clear evidence for new or progressive metastasis.     09/24/2024 CT CHEST/ABDOMEN/PELVIS: Interval enlargement of the patient's left lower lobe partially calcified mass as well as interval development of a moderate left-sided pleural effusion since the prior study suggestive of progression disease. LLL partially calcified mass now measures 6.0 x 4.1 cm (previously measured 4.6 x 2.1 cm). No progression disease seen within the abdomen or pelvis.     Recommended for 2nd line therapy Taxotere/ Cyramza.     10/08/2024 Ultrasound guided diagnostic and therapeutic left thoracentesis. A total of 850 ml clear yellow fluid was removed. Left pleural fluid negative for malignancy on 10/10/2024.    10/10/2024 MRI BRAIN: No acute intracranial abnormality. Punctate lesion, decreased from prior, at the left parasagittal frontal lobe. No new metastatic lesion. Other previously reported metastatic lesions are difficult to appreciate, likely

## 2025-05-09 ENCOUNTER — HOSPITAL ENCOUNTER (OUTPATIENT)
Dept: CT IMAGING | Age: 70
Discharge: HOME | End: 2025-05-11
Attending: INTERNAL MEDICINE
Payer: COMMERCIAL

## 2025-05-09 DIAGNOSIS — C79.51 SECONDARY MALIGNANT NEOPLASM OF BONE AND BONE MARROW (HCC): ICD-10-CM

## 2025-05-09 DIAGNOSIS — C79.52 SECONDARY MALIGNANT NEOPLASM OF BONE AND BONE MARROW (HCC): ICD-10-CM

## 2025-05-09 DIAGNOSIS — C34.32 PRIMARY MALIGNANT NEOPLASM OF BRONCHUS OF LEFT LOWER LOBE (HCC): ICD-10-CM

## 2025-05-09 PROCEDURE — 6360000004 HC RX CONTRAST MEDICATION: Performed by: RADIOLOGY

## 2025-05-09 PROCEDURE — 71260 CT THORAX DX C+: CPT

## 2025-05-09 PROCEDURE — 74177 CT ABD & PELVIS W/CONTRAST: CPT

## 2025-05-09 RX ORDER — SODIUM CHLORIDE 0.9 % (FLUSH) 0.9 %
10 SYRINGE (ML) INJECTION
Status: DISCONTINUED | OUTPATIENT
Start: 2025-05-09 | End: 2025-05-12 | Stop reason: HOSPADM

## 2025-05-09 RX ORDER — IOPAMIDOL 755 MG/ML
75 INJECTION, SOLUTION INTRAVASCULAR
Status: DISCONTINUED | OUTPATIENT
Start: 2025-05-09 | End: 2025-05-12 | Stop reason: HOSPADM

## 2025-05-09 RX ORDER — IOPAMIDOL 755 MG/ML
18 INJECTION, SOLUTION INTRAVASCULAR
Status: DISCONTINUED | OUTPATIENT
Start: 2025-05-09 | End: 2025-05-12 | Stop reason: HOSPADM

## 2025-05-28 ENCOUNTER — HOSPITAL ENCOUNTER (OUTPATIENT)
Dept: RADIATION ONCOLOGY | Age: 70
Discharge: HOME | End: 2025-05-28
Payer: COMMERCIAL

## 2025-05-28 VITALS
SYSTOLIC BLOOD PRESSURE: 123 MMHG | WEIGHT: 152.8 LBS | TEMPERATURE: 98.1 F | RESPIRATION RATE: 18 BRPM | DIASTOLIC BLOOD PRESSURE: 71 MMHG | OXYGEN SATURATION: 98 % | HEART RATE: 77 BPM | BODY MASS INDEX: 25.43 KG/M2

## 2025-05-28 DIAGNOSIS — C34.90 MALIGNANT NEOPLASM OF LUNG, UNSPECIFIED LATERALITY, UNSPECIFIED PART OF LUNG (HCC): Primary | ICD-10-CM

## 2025-05-28 PROCEDURE — 99215 OFFICE O/P EST HI 40 MIN: CPT

## 2025-05-28 NOTE — PROGRESS NOTES
Cammy Lopez  1955 70 y.o.      Referring Physician: Dr. Maloney    PCP: Jovani Santos, DO     Vitals:    05/28/25 1134   BP: 123/71   Pulse: 77   Resp: 18   Temp: 98.1 °F (36.7 °C)   SpO2: 98%        Wt Readings from Last 3 Encounters:   05/28/25 69.3 kg (152 lb 12.8 oz)   05/06/25 69.1 kg (152 lb 6.4 oz)   03/18/25 69 kg (152 lb 3.2 oz)        Body mass index is 25.43 kg/m².               Chief Complaint:   Chief Complaint   Patient presents with    Cancer          Cancer Staging   No matching staging information was found for the patient.      Prior Radiation Therapy? SRS and lumbar treated by Dr. Liu    Prior Chemotherapy? YES: Site Treated: lung          Facility: Blood and cancer          Date: 2023    Prior Hormonal Therapy? NO    Head and Neck Cancer? No, patient does NOT have HN cancer.            Current Outpatient Medications   Medication Sig Dispense Refill    levoFLOXacin (LEVAQUIN) 750 MG tablet       oxymetazoline (12 HOUR NASAL SPRAY) 0.05 % nasal spray 2 sprays by Nasal route 2 times daily      apixaban (ELIQUIS) 5 MG TABS tablet Take 2 tablets by mouth 2 times daily for 4 days, THEN 1 tablet 2 times daily. 60 tablet 0    clobetasol (TEMOVATE) 0.05 % ointment To hands      FULPHILA 6 MG/0.6ML injection       potassium chloride (MICRO-K) 10 MEQ extended release capsule Take 1 capsule by mouth daily      Ramucirumab (CYRAMZA IV) Infuse intravenously 12/13/24      sodium chloride 0.9 % SOLN 250 mL with fosaprepitant 150 MG SOLR 150 mg Infuse 150 mg intravenously once 12/13/2024      folic acid (FOLVITE) 1 MG tablet Take 1 tablet by mouth daily      furosemide (LASIX) 20 MG tablet Take 1 tablet by mouth daily      primidone (MYSOLINE) 50 MG tablet Take 2 tablets by mouth nightly      predniSONE (DELTASONE) 10 MG tablet Take 1 tablet by mouth daily with food      fentaNYL (DURAGESIC) 25 MCG/HR APPLY 1 PATCH TOPICALL EVERY 72 HOURS      gabapentin (NEURONTIN) 300 MG capsule Take 1 capsule

## 2025-05-28 NOTE — PROGRESS NOTES
Radiation Oncology      Freddy Maloney III, MD MS DABR   [facility]      Referring Physician: ***      Primary Care Physician:Jovani Santos DO   Primary Oncologist: ***      Diagnosis: SH IV NSCLC s/p systemic Tx with a soltiary oligo persitatnt primary lesion   -HO SRS (2 courses / 2 ISO each  - see mosaiq)   -HO PALL sacral RT      Service:  Radiation Oncology consultation performed on ***        HPI:        ***      -----    Per MEDONC:      ***    -----    Pathology reviewed:      ***    -----      Past Medical History:   Diagnosis Date    Hypertension     Lacrimal duct stenosis, bilateral 12/2023    S/p endoscopic DCR at [facility].    Malignant neoplasm of lower lobe of left lung (HCC) 02/14/2023    Adenocarcinoma     Secondary cancer of bone (HCC) 03/31/2023    Right sacral mass bone biopsy: metastatic adenocarcinoma.     Secondary cancer of brain (HCC) 03/22/2023    Secondary cancer of brain (HCC) 03/07/2024    2 new CNS metastases on MRI Brain.    Status post stereotactic radiosurgery 04/18/2023    SRS to right lateral frontal met, 2400 cGy/ 1 fraction.    Status post stereotactic radiosurgery 04/20/2023    SRS to right high posterior frontal met, 2400 cGy/ 1 fraction.    Status post stereotactic radiosurgery 04/26/2023    SRS to right sacrum bone met, 2400 cGy/ 4 fractions completed 05/01/2023.    Status post stereotactic radiosurgery 03/29/2024    SRS to left frontal met, 2400 cGy/ 1 fraction.    Status post stereotactic radiosurgery 03/29/2024    SRS to the left parasaggital parietal metastasis, 2400 cGy/ 1 fraction.    Thyroid disease        Past Surgical History:   Procedure Laterality Date    BRONCHOSCOPY N/A 3/7/2023    BRONCHOSCOPY ENDOBRONCHIAL ULTRASOUND, TBB, FNA,TBL, CYTOLOGY performed by Raj Keen MD at [facility] ENDOSCOPY    BRONCHOSCOPY N/A 3/7/2023    BRONCHOSCOPY BRUSHINGS performed by Raj SHEFFIELD

## 2025-06-06 ENCOUNTER — HOSPITAL ENCOUNTER (OUTPATIENT)
Dept: RADIATION ONCOLOGY | Age: 70
Discharge: HOME | End: 2025-06-06
Payer: COMMERCIAL

## 2025-06-06 PROCEDURE — 77334 RADIATION TREATMENT AID(S): CPT | Performed by: RADIOLOGY

## 2025-06-20 ENCOUNTER — HOSPITAL ENCOUNTER (OUTPATIENT)
Dept: RADIATION ONCOLOGY | Age: 70
Discharge: HOME | End: 2025-06-20
Payer: COMMERCIAL

## 2025-06-30 ENCOUNTER — HOSPITAL ENCOUNTER (OUTPATIENT)
Dept: RADIATION ONCOLOGY | Age: 70
Discharge: HOME | End: 2025-06-30
Payer: COMMERCIAL

## 2025-06-30 DIAGNOSIS — C34.32 MALIGNANT NEOPLASM OF LOWER LOBE OF LEFT LUNG (HCC): Primary | ICD-10-CM

## 2025-06-30 PROCEDURE — 77373 STRTCTC BDY RAD THER TX DLVR: CPT | Performed by: RADIOLOGY

## 2025-06-30 NOTE — PROGRESS NOTES
Radiation Oncology  procedure note:          Ms.Elizabeth Lopez underwent fractionated external beam radiation therapy using a SBRT / SABR technique.     I was personally present for the treatment planning (+/- 4D CT, W/WO Ab compression) imaging and pt setup to ensure appropriate immobilization to meet current ROCK standards. After a detailed review of the treatment plan and appropriate physics QA / oversight this pt was scheduled and underwent hypo fractionated treatment as noted per the D/I in Mosaiq.  Typical fractionation schemes include but are not limited to 6000 cGy in 5-8 fractions.  The NCCN guidelines and pt workup including a detailed discussion of the risks, benefits and alternative were discussed previously [RTOG dose constraints met per plan as applicable- see Mosaiq].  The pt verbalized understanding for the risks of this procedure today prior to Tx.                 Today, after a dual identification time out (including a brief plan overview )- I personally reviewed the complex multifaceted immobilization apparatus W/WO compression +/- Synergy (PRN), patient position, and 4D imaging (if applicable) prior to the treatment delivery to ensure accuracy.  The SBRT team, including myself, were all present for the set up CT scan and delivery of the fraction (the latter on a PRN basis).  The patient successfully completed the procedure today in stable condition; this procedure was well-tolerated today.         Cammy Thornton has now received 750 cGy in 1/8 fractions directed to the left Hilum.        Delgado Arizmendi  Radiation Oncology

## 2025-07-01 ENCOUNTER — HOSPITAL ENCOUNTER (OUTPATIENT)
Dept: RADIATION ONCOLOGY | Age: 70
Discharge: HOME | End: 2025-07-01
Payer: COMMERCIAL

## 2025-07-01 DIAGNOSIS — C34.32 MALIGNANT NEOPLASM OF LOWER LOBE OF LEFT LUNG (HCC): Primary | ICD-10-CM

## 2025-07-01 PROCEDURE — 77373 STRTCTC BDY RAD THER TX DLVR: CPT | Performed by: RADIOLOGY

## 2025-07-01 NOTE — PROGRESS NOTES
Radiation Oncology  procedure note:              Ms.Elizabeth Lopez underwent fractionated external beam radiation therapy using a SBRT / SABR technique.     I was personally present for the treatment planning (+/- 4D CT, W/WO Ab compression) imaging and pt setup to ensure appropriate immobilization to meet current ROCK standards. After a detailed review of the treatment plan and appropriate physics QA / oversight this pt was scheduled and underwent hypo fractionated treatment as noted per the D/I in [place].  Typical fractionation schemes include but are not limited to 6000 cGy in 5-8 fractions.  The NCCN guidelines and pt workup including a detailed discussion of the risks, benefits and alternative were discussed previously [RTOG dose constraints met per plan as applicable- see [place]].  The pt verbalized understanding for the risks of this procedure today prior to Tx.                 Today, after a dual identification time out (including a brief plan overview )- I personally reviewed the complex multifaceted immobilization apparatus W/WO compression +/- Synergy (PRN), patient position, and 4D imaging (if applicable) prior to the treatment delivery to ensure accuracy.  The SBRT team, including myself, were all present for the set up CT scan and delivery of the fraction (the latter on a PRN basis).  The patient successfully completed the procedure today in stable condition; this procedure was well-tolerated today.         Cammy Thornton has now received 1500 cGy in 2/8 fractions directed to the left Hilum.

## 2025-07-02 ENCOUNTER — HOSPITAL ENCOUNTER (OUTPATIENT)
Dept: RADIATION ONCOLOGY | Age: 70
Discharge: HOME | End: 2025-07-02
Payer: COMMERCIAL

## 2025-07-02 VITALS
DIASTOLIC BLOOD PRESSURE: 68 MMHG | SYSTOLIC BLOOD PRESSURE: 139 MMHG | WEIGHT: 150.5 LBS | HEART RATE: 86 BPM | TEMPERATURE: 97.2 F | BODY MASS INDEX: 25.04 KG/M2 | RESPIRATION RATE: 18 BRPM | OXYGEN SATURATION: 96 %

## 2025-07-02 DIAGNOSIS — C34.90 MALIGNANT NEOPLASM OF LUNG, UNSPECIFIED LATERALITY, UNSPECIFIED PART OF LUNG (HCC): Primary | ICD-10-CM

## 2025-07-02 PROCEDURE — 77373 STRTCTC BDY RAD THER TX DLVR: CPT | Performed by: RADIOLOGY

## 2025-07-02 NOTE — ADDENDUM NOTE
Encounter addended by: Nohemi Ng, RN on: 7/2/2025 12:11 PM   Actions taken: Flowsheet accepted, Order Reconciliation Section accessed, Medication List reviewed, Clinical Note Signed

## 2025-07-02 NOTE — PROGRESS NOTES
Radiation Oncology        Ms.Elizabeth YOLANDA Lopez underwent fractionated external beam radiation therapy using a SBRT / SABR technique.    I was personally present for the treatment planning (+/- 4D CT, W/WO Ab compression) imaging and pt setup to ensure appropriate immobilization to meet current ROCK standards. After a detailed review of the treatment plan and appropriate physics QA / oversight this pt was scheduled and underwent hypo fractionated treatment as noted per the D/I in [place].  Typical fractionation schemes include but are not limited to 6000 Gy in 3-5 fractions.  The NCCN guidelines and pt workup including a detailed discussion of the risks, benefits and alternative were discussed previously [RTOG dose constraints met per plan as applicable- see [place]].  The pt verbalized understanding for the risks of this procedure today prior to Tx.   Today, after a dual identification time out (including a brief plan overview )- I personally reviewed the complex multifaceted immobilization apparatus W/WO compression +/- Synergy (PRN), patient position, and 4D imaging (if applicable) prior to the treatment delivery to ensure accuracy.  The SBRT team, including myself, were all present for the set up CT scan and delivery of the fraction (the latter on a PRN basis).  The patient successfully completed the procedure today in stable condition; this procedure was well tolerated today.         Cammy Lopez has now received 2250 cGy in 3/8 fractions directed to the L lung.        Freddy Maloney III, MD MS [facility]  Radiation Oncology  Cell: [phone]    [facility]:  [phone]   FAX: [phone]  [facility]:  [phone]   FAX:    [phone]  [facility]:  [phone]   FAX:  [phone]              
Cammy Lopez  7/2/2025  Wt Readings from Last 3 Encounters:   07/02/25 68.3 kg (150 lb 8 oz)   05/28/25 69.3 kg (152 lb 12.8 oz)   05/06/25 69.1 kg (152 lb 6.4 oz)     Body mass index is 25.04 kg/m².        Treatment Area:left hilium    Patient was seen today for weekly visit.      Comfort Alteration  KPS:90%  Fatigue: Moderate    Ventilation Alterations  Cough: Yes  Hemoptysis: No  Mucus Color: na  Dyspnea: No  O2 Sat: 96%    Nutritional Alteration  Anorexia: No  Nausea: No   Vomiting: No     Skin Alteration   Sensation:good and using lotion    Radiation Dermatitis:  na    Mucous Membrane Alteration  Voice Changes/ Stridor/Larynx: no  Pharynx & Esophagus: na    Elimination Alterations  Constipation: no  Diarrhea:  no      Emotional  Coping: effective      Injury, potential bleeding or infection: na3    Other:na    Lab Results   Component Value Date    WBC 5.9 02/03/2025    HGB 10.1 (L) 02/03/2025    HCT 32.9 (L) 02/03/2025     02/03/2025         /68   Pulse 86   Temp 97.2 °F (36.2 °C) (Skin)   Resp 18   Wt 68.3 kg (150 lb 8 oz)   SpO2 96%   BMI 25.04 kg/m²   BP within normal range? yes          Assessment/Plan:3/8fx  2250/6000cGY and tolerated well.    Nohemi Ng RN      
encounter.           OBJECTIVE:  Alert and fully ambulatory. Pleasant and conversant.          Physical Examination: General appearance - alert, well appearing, and in no distress.            Wt Readings from Last 3 Encounters:   05/28/25 69.3 kg (152 lb 12.8 oz)   05/06/25 69.1 kg (152 lb 6.4 oz)   03/18/25 69 kg (152 lb 3.2 oz)         ASSESSMENT/PLAN:     Patient is tolerating treatments well with expected toxicities. RBA were reviewed prior to first fraction and PRN.    Current and planned dose reviewed. Goals of treatment and potential side effects were reviewed with the patient PRN. Treatment imaging has been personally reviewed for accuracy and precision.    Questions answered to apparent satisfaction.    Treatments will continue as planned.        Freddy Maloney III, MD MS DABR  Radiation Oncologist        [facility] ([facility]): [phone] /// FAX: [phone]  [facility] [phone] /// FAX: [phone]  [facility]): [phone] /// FAX: [phone]

## 2025-07-02 NOTE — PATIENT INSTRUCTIONS
Continue daily fractionated radiation therapy as scheduled. Please see weekly OTV note and intial consultation letter in EPIC for clinical details.        Freddy Maloney III, MD MS DABR    [facility]:  [phone]   FAX: [phone]  [facility]:  [phone]   FAX:    [phone]  [facility]:  [phone]   FAX:  [phone]  Email: [email or web address]

## 2025-07-03 ENCOUNTER — HOSPITAL ENCOUNTER (OUTPATIENT)
Dept: RADIATION ONCOLOGY | Age: 70
Discharge: HOME | End: 2025-07-03
Payer: COMMERCIAL

## 2025-07-03 PROCEDURE — 77373 STRTCTC BDY RAD THER TX DLVR: CPT | Performed by: RADIOLOGY

## 2025-07-03 NOTE — PROGRESS NOTES
RADIATION ONCOLOGY PROCEDURE NOTE          Ms.Elizabeth YOLANDA Lopez underwent fractionated external beam radiation therapy using a SBRT / SABR technique.    I was personally present for the treatment planning (+/- 4D CT, W/WO Ab compression) imaging and pt setup to ensure appropriate immobilization to meet current ROCK standards. After a detailed review of the treatment plan and appropriate physics QA / oversight this pt was scheduled and underwent hypo fractionated treatment as noted per the D/I in [place].  Typical fractionation schemes include but are not limited to 6000 cGy in 5 fractions.  The NCCN guidelines and pt workup including a detailed discussion of the risks, benefits and alternative were discussed previously [RTOG dose constraints met per plan as applicable- see [place]].  The pt verbalized understanding for the risks of this procedure today prior to Tx.     Today, after a dual identification time out (including a brief plan overview )- I personally reviewed the complex multifaceted immobilization apparatus W/WO compression +/- Synergy (PRN), patient position, and 4D imaging (if applicable) prior to the treatment delivery to ensure accuracy.  The SBRT team, including myself, were all present for the set up CT scan and delivery of the fraction (the latter on a PRN basis).  The patient successfully completed the procedure today in stable condition; this procedure was well-tolerated today.       Cammy Lopez has now received 3000 cGy in 4/8 fractions directed to the left Hilum.      Salas Liu MD, RADHA, FACRO, FACR, FCTSI, FASTRO    Department of Radiation Oncology  [facility] ([facility]: [phone] (FAX: [phone])  [facility]: [phone] (FAX: [phone])  [facility]:  [phone] (FAX:  [phone])    NOTE: This report was transcribed using voice recognition software. Every effort was made to ensure accuracy; however, inadvertent

## 2025-07-07 ENCOUNTER — HOSPITAL ENCOUNTER (OUTPATIENT)
Dept: RADIATION ONCOLOGY | Age: 70
Discharge: HOME | End: 2025-07-07
Payer: COMMERCIAL

## 2025-07-07 PROCEDURE — 77014 CHG CT GUIDANCE RADIATION THERAPY FLDS PLACEMENT: CPT | Performed by: RADIOLOGY

## 2025-07-07 PROCEDURE — 77386 HC NTSTY MODUL RAD TX DLVR CPLX: CPT | Performed by: RADIOLOGY

## 2025-07-07 PROCEDURE — 77427 RADIATION TX MANAGEMENT X5: CPT | Performed by: RADIOLOGY

## 2025-07-08 ENCOUNTER — HOSPITAL ENCOUNTER (OUTPATIENT)
Dept: RADIATION ONCOLOGY | Age: 70
Discharge: HOME | End: 2025-07-08
Payer: COMMERCIAL

## 2025-07-08 PROCEDURE — 77386 HC NTSTY MODUL RAD TX DLVR CPLX: CPT | Performed by: RADIOLOGY

## 2025-07-09 ENCOUNTER — HOSPITAL ENCOUNTER (OUTPATIENT)
Dept: RADIATION ONCOLOGY | Age: 70
Discharge: HOME | End: 2025-07-09
Payer: COMMERCIAL

## 2025-07-09 VITALS
WEIGHT: 148.5 LBS | OXYGEN SATURATION: 97 % | DIASTOLIC BLOOD PRESSURE: 61 MMHG | SYSTOLIC BLOOD PRESSURE: 122 MMHG | TEMPERATURE: 97.6 F | RESPIRATION RATE: 18 BRPM | HEART RATE: 74 BPM | BODY MASS INDEX: 24.71 KG/M2

## 2025-07-09 DIAGNOSIS — C34.90 MALIGNANT NEOPLASM OF LUNG, UNSPECIFIED LATERALITY, UNSPECIFIED PART OF LUNG (HCC): Primary | ICD-10-CM

## 2025-07-09 PROCEDURE — 77386 HC NTSTY MODUL RAD TX DLVR CPLX: CPT | Performed by: RADIOLOGY

## 2025-07-09 NOTE — PATIENT INSTRUCTIONS
Continue daily fractionated radiation therapy as scheduled. Please see weekly OTV note and intial consultation letter in EPIC for clinical details.        Freddy Maloney III, MD MS DABR    [facility]:  [phone]   FAX: [phone]  [facility]:  [phone]   FAX:    [phone]  [facility]:  [phone]   FAX:  [phone]  Email: [email or web address]

## 2025-07-09 NOTE — PROGRESS NOTES
Cammy GUERRERO John  7/9/2025  Wt Readings from Last 3 Encounters:   07/09/25 67.4 kg (148 lb 8 oz)   07/02/25 68.3 kg (150 lb 8 oz)   05/28/25 69.3 kg (152 lb 12.8 oz)     Body mass index is 24.71 kg/m².        Treatment Area:[facility]     Patient was seen today for weekly visit.      Comfort Alteration  KPS:90%  Fatigue: Mild    Ventilation Alterations  Cough: No  Hemoptysis: No  Mucus Color: no  Dyspnea: No  O2 Sat: 98%    Nutritional Alteration  Anorexia: No  Nausea: No   Vomiting: No     Skin Alteration   Sensation:no    Radiation Dermatitis:  no    Mucous Membrane Alteration  Voice Changes/ Stridor/Larynx: no  Pharynx & Esophagus: no    Elimination Alterations  Constipation: no  Diarrhea:  no      Emotional  Coping: effective      Injury, potential bleeding or infection: no    Other:no    Lab Results   Component Value Date    WBC 5.9 02/03/2025    HGB 10.1 (L) 02/03/2025    HCT 32.9 (L) 02/03/2025     02/03/2025         /61   Pulse 74   Temp 97.6 °F (36.4 °C) (Temporal)   Resp 18   Wt 67.4 kg (148 lb 8 oz)   SpO2 97%   BMI 24.71 kg/m²   BP within normal range? yes      Assessment/Plan: Pt completed 7/8fx and 5250/6000cGy.    Lien Flores RN

## 2025-07-09 NOTE — PROGRESS NOTES
DEPARTMENT OF RADIATION ONCOLOGY ON TREATMENT VISIT         7/9/2025      NAME:  Cammy Lopez    YOB: 1955    Diagnosis: lung mass SBRT salvage    SUBJECTIVE:   Cammy Lopez has now received fractionated external beam radiation therapy - ongoing.    Past medical, surgical, social and family histories reviewed and updated as indicated.    Pain: controlled    ALLERGIES:  Pcn [penicillins]         Current Outpatient Medications   Medication Sig Dispense Refill    levoFLOXacin (LEVAQUIN) 750 MG tablet       oxymetazoline (12 HOUR NASAL SPRAY) 0.05 % nasal spray 2 sprays by Nasal route 2 times daily      apixaban (ELIQUIS) 5 MG TABS tablet Take 2 tablets by mouth 2 times daily for 4 days, THEN 1 tablet 2 times daily. 60 tablet 0    clobetasol (TEMOVATE) 0.05 % ointment To hands      FULPHILA 6 MG/0.6ML injection       potassium chloride (MICRO-K) 10 MEQ extended release capsule Take 1 capsule by mouth daily      Ramucirumab (CYRAMZA IV) Infuse intravenously 12/13/24      sodium chloride 0.9 % SOLN 250 mL with fosaprepitant 150 MG SOLR 150 mg Infuse 150 mg intravenously once 12/13/2024      folic acid (FOLVITE) 1 MG tablet Take 1 tablet by mouth daily      furosemide (LASIX) 20 MG tablet Take 1 tablet by mouth daily      primidone (MYSOLINE) 50 MG tablet Take 2 tablets by mouth nightly      predniSONE (DELTASONE) 10 MG tablet Take 1 tablet by mouth daily with food      fentaNYL (DURAGESIC) 25 MCG/HR APPLY 1 PATCH TOPICALL EVERY 72 HOURS      gabapentin (NEURONTIN) 300 MG capsule Take 1 capsule by mouth in the morning and at bedtime.      ondansetron (ZOFRAN-ODT) 8 MG TBDP disintegrating tablet       oxyCODONE-acetaminophen (PERCOCET)  MG per tablet Take 1 tablet by mouth every 6 hours as needed.      prochlorperazine (COMPAZINE) 10 MG tablet       losartan (COZAAR) 50 MG tablet TAKE 1 TABLET BY MOUTH EVERYDAY FOR HYPERTENSION       No current facility-administered medications

## 2025-07-10 ENCOUNTER — HOSPITAL ENCOUNTER (OUTPATIENT)
Dept: RADIATION ONCOLOGY | Age: 70
Discharge: HOME | End: 2025-07-10
Payer: COMMERCIAL

## 2025-07-10 PROCEDURE — 77386 HC NTSTY MODUL RAD TX DLVR CPLX: CPT | Performed by: RADIOLOGY

## 2025-07-25 DIAGNOSIS — Z01.812 BLOOD TESTS PRIOR TO TREATMENT OR PROCEDURE: Primary | ICD-10-CM

## 2025-07-28 ENCOUNTER — HOSPITAL ENCOUNTER (OUTPATIENT)
Dept: MRI IMAGING | Age: 70
Discharge: HOME | End: 2025-07-30
Payer: COMMERCIAL

## 2025-07-28 DIAGNOSIS — C79.31 SECONDARY CANCER OF BRAIN (HCC): ICD-10-CM

## 2025-07-28 DIAGNOSIS — Z85.841 ENCOUNTER FOR FOLLOW-UP SURVEILLANCE OF BRAIN CANCER: ICD-10-CM

## 2025-07-28 DIAGNOSIS — Z08 ENCOUNTER FOR FOLLOW-UP SURVEILLANCE OF BRAIN CANCER: ICD-10-CM

## 2025-07-28 PROCEDURE — 6360000004 HC RX CONTRAST MEDICATION: Performed by: RADIOLOGY

## 2025-07-28 PROCEDURE — A9579 GAD-BASE MR CONTRAST NOS,1ML: HCPCS | Performed by: RADIOLOGY

## 2025-07-28 PROCEDURE — 70553 MRI BRAIN STEM W/O & W/DYE: CPT

## 2025-07-28 RX ORDER — GADOTERIDOL 279.3 MG/ML
25 INJECTION INTRAVENOUS
Status: COMPLETED | OUTPATIENT
Start: 2025-07-28 | End: 2025-07-28

## 2025-07-28 RX ADMIN — GADOTERIDOL 25 ML: 279.3 INJECTION, SOLUTION INTRAVENOUS at 13:22

## 2025-07-31 ENCOUNTER — TELEPHONE (OUTPATIENT)
Dept: RADIATION ONCOLOGY | Age: 70
End: 2025-07-31

## 2025-07-31 DIAGNOSIS — F41.9 ANXIETY DUE TO INVASIVE PROCEDURE: Primary | ICD-10-CM

## 2025-07-31 DIAGNOSIS — C79.31 SECONDARY CANCER OF BRAIN (HCC): ICD-10-CM

## 2025-07-31 RX ORDER — LORAZEPAM 0.5 MG/1
TABLET ORAL
Qty: 2 TABLET | Refills: 0 | Status: SHIPPED | OUTPATIENT
Start: 2025-07-31 | End: 2025-08-31

## 2025-07-31 NOTE — TELEPHONE ENCOUNTER
Called Cammy Lopez to discuss results of MRI BRAIN completed 07/28/2025 and recommendations to proceed with SRS. The patient verbalized understanding of results and treatment recommendations. The patient is agreeable to proceed with SRS to the new CNS metastasis left frontal lobe. Informed Cammy one of the RTTs from [facility] Radiation Oncology department will be calling to schedule CT SIM. The patient informs me she had difficultly tolerating the mask required for treatment in past.  PMDP reviewed, Ativan Escribed to patient preferred choice pharmacy to be taken 1 hour prior to CT SIM for SRS planning and 1 hour prior to SRS treatment time. The patient is scheduled to see me in clinic follow-up visit next week- 08/05/2025.

## 2025-08-05 ENCOUNTER — HOSPITAL ENCOUNTER (OUTPATIENT)
Dept: RADIATION ONCOLOGY | Age: 70
Discharge: HOME | End: 2025-08-05
Payer: COMMERCIAL

## 2025-08-05 ENCOUNTER — APPOINTMENT (OUTPATIENT)
Dept: RADIATION ONCOLOGY | Age: 70
End: 2025-08-05
Payer: COMMERCIAL

## 2025-08-05 VITALS
TEMPERATURE: 97.5 F | BODY MASS INDEX: 24.17 KG/M2 | DIASTOLIC BLOOD PRESSURE: 57 MMHG | HEART RATE: 89 BPM | OXYGEN SATURATION: 95 % | SYSTOLIC BLOOD PRESSURE: 100 MMHG | RESPIRATION RATE: 18 BRPM | WEIGHT: 145.25 LBS

## 2025-08-05 DIAGNOSIS — Z71.89 ENCOUNTER TO DISCUSS TREATMENT OPTIONS: ICD-10-CM

## 2025-08-05 DIAGNOSIS — C79.31 SECONDARY CANCER OF BRAIN (HCC): Primary | ICD-10-CM

## 2025-08-05 DIAGNOSIS — Z92.3 STATUS POST STEREOTACTIC RADIOSURGERY: ICD-10-CM

## 2025-08-05 DIAGNOSIS — Z71.2 ENCOUNTER TO DISCUSS TEST RESULTS: ICD-10-CM

## 2025-08-05 PROCEDURE — 77334 RADIATION TREATMENT AID(S): CPT | Performed by: SPECIALIST

## 2025-08-05 PROCEDURE — 6360000004 HC RX CONTRAST MEDICATION: Performed by: SPECIALIST

## 2025-08-05 RX ORDER — IOPAMIDOL 755 MG/ML
100 INJECTION, SOLUTION INTRAVASCULAR
Status: COMPLETED | OUTPATIENT
Start: 2025-08-05 | End: 2025-08-05

## 2025-08-05 RX ADMIN — IOPAMIDOL 100 ML: 755 INJECTION, SOLUTION INTRAVENOUS at 09:20

## 2025-08-07 PROCEDURE — 77338 DESIGN MLC DEVICE FOR IMRT: CPT | Performed by: SPECIALIST

## 2025-08-07 PROCEDURE — 77300 RADIATION THERAPY DOSE PLAN: CPT | Performed by: SPECIALIST

## 2025-08-07 PROCEDURE — 77301 RADIOTHERAPY DOSE PLAN IMRT: CPT | Performed by: SPECIALIST

## 2025-08-19 ENCOUNTER — HOSPITAL ENCOUNTER (OUTPATIENT)
Dept: RADIATION ONCOLOGY | Age: 70
Discharge: HOME | End: 2025-08-19
Payer: COMMERCIAL

## 2025-08-19 ENCOUNTER — HOSPITAL ENCOUNTER (OUTPATIENT)
Dept: GENERAL RADIOLOGY | Age: 70
Discharge: HOME | End: 2025-08-21
Payer: COMMERCIAL

## 2025-08-19 DIAGNOSIS — C34.32 PRIMARY MALIGNANT NEOPLASM OF BRONCHUS OF LEFT LOWER LOBE (HCC): ICD-10-CM

## 2025-08-19 PROCEDURE — 77336 RADIATION PHYSICS CONSULT: CPT | Performed by: SPECIALIST

## 2025-08-19 PROCEDURE — 71046 X-RAY EXAM CHEST 2 VIEWS: CPT

## 2025-08-19 PROCEDURE — 77372 SRS LINEAR BASED: CPT | Performed by: SPECIALIST

## (undated) DEVICE — BRONCHOSCOPY PACK: Brand: MEDLINE INDUSTRIES, INC.

## (undated) DEVICE — APPLICATOR MEDICATED 26 CC SOLUTION HI LT ORNG CHLORAPREP

## (undated) DEVICE — ADAPTER TBNG DIA15MM SWVL FBROPT BRONCHSCP TERM 2 AXIS PEEP

## (undated) DEVICE — GLOVE ORANGE PI 7 1/2   MSG9075

## (undated) DEVICE — Device: Brand: SINGLE USE GUIDE SHEATH KIT

## (undated) DEVICE — 18 GA N.G. KIT, 10 PACK: Brand: SITE-RITE

## (undated) DEVICE — MINOR VASCULAR: Brand: MEDLINE INDUSTRIES, INC.

## (undated) DEVICE — 3M™ IOBAN™ 2 ANTIMICROBIAL INCISE DRAPE 6640EZ: Brand: IOBAN™ 2

## (undated) DEVICE — SOLUTION IV 500ML 0.9% SOD CHL PH 5 INJ USP VIAFLX PLAS

## (undated) DEVICE — Device: Brand: BALLOON

## (undated) DEVICE — SINGLE USE SUCTION VALVE MAJ-209: Brand: SINGLE USE SUCTION VALVE (STERILE)

## (undated) DEVICE — Device: Brand: MEDEX

## (undated) DEVICE — CONTAINER SPEC 60ML PH 7NEUTRAL BUFF FRMLN RDY TO USE

## (undated) DEVICE — NEEDLE ASPIR 22GA L40MM WRK L70CM SYR VLV ECHOGENIC DIMPLED

## (undated) DEVICE — BRUSH CYTO L140CM DIA1.5MM WRK CHN 2MM BRIST SHTH RNG HNDL

## (undated) DEVICE — DISPOSABLE BIOPSY FORCEPS: Brand: DISPOSABLE BIOPSY FORCEPS

## (undated) DEVICE — SOLUTION IRRIG 500ML 0.9% SOD CHLO USP POUR PLAS BTL

## (undated) DEVICE — SINGLE USE BIOPSY VALVE MAJ-210: Brand: SINGLE USE BIOPSY VALVE (STERILE)

## (undated) DEVICE — DOUBLE  SWIVEL ELBOW 15M - DOUBLE FLIP TOP CAP WITH SEAL - 22M/15F: Brand: DOUBLE  SWIVEL ELBOW 15M - DOUBLE FLIP TOP CAP WITH SEAL - 22M/15F